# Patient Record
Sex: FEMALE | Race: WHITE | Employment: OTHER | URBAN - NONMETROPOLITAN AREA
[De-identification: names, ages, dates, MRNs, and addresses within clinical notes are randomized per-mention and may not be internally consistent; named-entity substitution may affect disease eponyms.]

---

## 2017-08-15 ENCOUNTER — HOSPITAL ENCOUNTER (OUTPATIENT)
Age: 67
Setting detail: OBSERVATION
Discharge: HOME | DRG: 287 | End: 2017-08-16
Attending: EMERGENCY MEDICINE | Admitting: FAMILY MEDICINE
Payer: COMMERCIAL

## 2017-08-15 ENCOUNTER — APPOINTMENT (OUTPATIENT)
Dept: GENERAL RADIOLOGY | Age: 67
DRG: 287 | End: 2017-08-15
Payer: COMMERCIAL

## 2017-08-15 DIAGNOSIS — I25.10 CORONARY ARTERY DISEASE INVOLVING NATIVE CORONARY ARTERY OF NATIVE HEART, ANGINA PRESENCE UNSPECIFIED: ICD-10-CM

## 2017-08-15 DIAGNOSIS — I47.1 PSVT (PAROXYSMAL SUPRAVENTRICULAR TACHYCARDIA) (HCC): Primary | ICD-10-CM

## 2017-08-15 PROBLEM — I20.8 ANGINA AT REST (HCC): Status: ACTIVE | Noted: 2017-08-15

## 2017-08-15 PROBLEM — I20.89 ANGINA AT REST: Status: ACTIVE | Noted: 2017-08-15

## 2017-08-15 PROBLEM — I47.10 PSVT (PAROXYSMAL SUPRAVENTRICULAR TACHYCARDIA): Status: ACTIVE | Noted: 2017-08-15

## 2017-08-15 PROBLEM — Z72.0 NICOTINE ABUSE: Status: ACTIVE | Noted: 2017-08-15

## 2017-08-15 LAB
% CKMB: 4.2 % (ref 0–3)
% CKMB: 4.6 % (ref 0–3)
-: NORMAL
ABSOLUTE EOS #: 0.1 K/UL (ref 0–0.4)
ABSOLUTE LYMPH #: 3 K/UL (ref 1–4.8)
ABSOLUTE MONO #: 0.6 K/UL (ref 0–1)
ALBUMIN SERPL-MCNC: 3.8 G/DL (ref 3.5–5.2)
ALBUMIN/GLOBULIN RATIO: 0.7 (ref 1–2.5)
ALP BLD-CCNC: 84 U/L (ref 35–104)
ALT SERPL-CCNC: 13 U/L (ref 5–33)
AMORPHOUS: NORMAL
AMPHETAMINE SCREEN URINE: NEGATIVE
ANION GAP SERPL CALCULATED.3IONS-SCNC: 18 MMOL/L (ref 9–17)
AST SERPL-CCNC: 19 U/L
BACTERIA: NORMAL
BARBITURATE SCREEN URINE: NEGATIVE
BASOPHILS # BLD: 0 %
BASOPHILS ABSOLUTE: 0 K/UL (ref 0–0.2)
BENZODIAZEPINE SCREEN, URINE: NEGATIVE
BILIRUB SERPL-MCNC: 0.56 MG/DL (ref 0.3–1.2)
BILIRUBIN URINE: NEGATIVE
BNP INTERPRETATION: ABNORMAL
BUN BLDV-MCNC: 16 MG/DL (ref 8–23)
BUN/CREAT BLD: 23 (ref 9–20)
BUPRENORPHINE URINE: NEGATIVE
CALCIUM SERPL-MCNC: 9.7 MG/DL (ref 8.6–10.4)
CANNABINOID SCREEN URINE: NEGATIVE
CASTS UA: NORMAL /LPF
CHLORIDE BLD-SCNC: 97 MMOL/L (ref 98–107)
CK MB: 2.6 NG/ML
CK MB: 2.7 NG/ML
CKMB INTERPRETATION: ABNORMAL
CKMB INTERPRETATION: ABNORMAL
CO2: 24 MMOL/L (ref 20–31)
COCAINE METABOLITE, URINE: NEGATIVE
COLOR: YELLOW
COMMENT UA: ABNORMAL
CREAT SERPL-MCNC: 0.69 MG/DL (ref 0.5–0.9)
CRYSTALS, UA: NORMAL /HPF
DIFFERENTIAL TYPE: ABNORMAL
EOSINOPHILS RELATIVE PERCENT: 2 %
EPITHELIAL CELLS UA: NORMAL /HPF (ref 0–25)
GFR AFRICAN AMERICAN: >60 ML/MIN
GFR NON-AFRICAN AMERICAN: >60 ML/MIN
GFR SERPL CREATININE-BSD FRML MDRD: ABNORMAL ML/MIN/{1.73_M2}
GFR SERPL CREATININE-BSD FRML MDRD: ABNORMAL ML/MIN/{1.73_M2}
GLUCOSE BLD-MCNC: 110 MG/DL (ref 70–99)
GLUCOSE URINE: NEGATIVE
HCT VFR BLD CALC: 44.6 % (ref 36–46)
HEMOGLOBIN: 15.4 G/DL (ref 12–16)
KETONES, URINE: ABNORMAL
LEUKOCYTE ESTERASE, URINE: NEGATIVE
LV EF: 60 %
LVEF MODALITY: NORMAL
LYMPHOCYTES # BLD: 34 %
MCH RBC QN AUTO: 35.1 PG (ref 26–34)
MCHC RBC AUTO-ENTMCNC: 34.6 G/DL (ref 31–37)
MCV RBC AUTO: 101.3 FL (ref 80–100)
MDMA URINE: ABNORMAL
METHADONE SCREEN, URINE: NEGATIVE
METHAMPHETAMINE, URINE: NEGATIVE
MONOCYTES # BLD: 7 %
MUCUS: NORMAL
NITRITE, URINE: NEGATIVE
OPIATES, URINE: POSITIVE
OTHER OBSERVATIONS UA: NORMAL
OXYCODONE SCREEN URINE: NEGATIVE
PDW BLD-RTO: 14.4 % (ref 12.1–15.2)
PH UA: 6 (ref 5–9)
PHENCYCLIDINE, URINE: NEGATIVE
PLATELET # BLD: 207 K/UL (ref 140–450)
PLATELET ESTIMATE: ABNORMAL
PMV BLD AUTO: 7.8 FL (ref 6–12)
POTASSIUM SERPL-SCNC: 4.1 MMOL/L (ref 3.7–5.3)
PRO-BNP: 345 PG/ML
PROPOXYPHENE, URINE: NEGATIVE
PROTEIN UA: ABNORMAL
RBC # BLD: 4.4 M/UL (ref 4–5.2)
RBC # BLD: ABNORMAL 10*6/UL
RBC UA: NORMAL /HPF (ref 0–2)
RENAL EPITHELIAL, UA: NORMAL /HPF
SEG NEUTROPHILS: 57 %
SEGMENTED NEUTROPHILS ABSOLUTE COUNT: 5.2 K/UL (ref 1.8–7.7)
SODIUM BLD-SCNC: 139 MMOL/L (ref 135–144)
SPECIFIC GRAVITY UA: 1.02 (ref 1.01–1.02)
TEST INFORMATION: ABNORMAL
TOTAL CK: 56 U/L (ref 26–192)
TOTAL CK: 65 U/L (ref 26–192)
TOTAL PROTEIN: 9 G/DL (ref 6.4–8.3)
TRICHOMONAS: NORMAL
TRICYCLIC ANTIDEPRESSANTS, UR: NEGATIVE
TROPONIN INTERP: NORMAL
TROPONIN INTERP: NORMAL
TROPONIN T: <0.03 NG/ML
TROPONIN T: <0.03 NG/ML
TSH SERPL DL<=0.05 MIU/L-ACNC: 1.87 MIU/L (ref 0.3–5)
TURBIDITY: CLEAR
URINE HGB: ABNORMAL
UROBILINOGEN, URINE: NORMAL
WBC # BLD: 8.9 K/UL (ref 3.5–11)
WBC # BLD: ABNORMAL 10*3/UL
WBC UA: NORMAL /HPF (ref 0–5)
YEAST: NORMAL

## 2017-08-15 PROCEDURE — 84484 ASSAY OF TROPONIN QUANT: CPT

## 2017-08-15 PROCEDURE — 82553 CREATINE MB FRACTION: CPT

## 2017-08-15 PROCEDURE — 99222 1ST HOSP IP/OBS MODERATE 55: CPT | Performed by: INTERNAL MEDICINE

## 2017-08-15 PROCEDURE — 99285 EMERGENCY DEPT VISIT HI MDM: CPT

## 2017-08-15 PROCEDURE — 2500000003 HC RX 250 WO HCPCS

## 2017-08-15 PROCEDURE — 6360000002 HC RX W HCPCS

## 2017-08-15 PROCEDURE — 96372 THER/PROPH/DIAG INJ SC/IM: CPT

## 2017-08-15 PROCEDURE — 80306 DRUG TEST PRSMV INSTRMNT: CPT

## 2017-08-15 PROCEDURE — 6370000000 HC RX 637 (ALT 250 FOR IP): Performed by: FAMILY MEDICINE

## 2017-08-15 PROCEDURE — 71010 XR CHEST PORTABLE: CPT

## 2017-08-15 PROCEDURE — 96374 THER/PROPH/DIAG INJ IV PUSH: CPT

## 2017-08-15 PROCEDURE — 96375 TX/PRO/DX INJ NEW DRUG ADDON: CPT

## 2017-08-15 PROCEDURE — 83880 ASSAY OF NATRIURETIC PEPTIDE: CPT

## 2017-08-15 PROCEDURE — 6360000002 HC RX W HCPCS: Performed by: FAMILY MEDICINE

## 2017-08-15 PROCEDURE — 6360000002 HC RX W HCPCS: Performed by: PHYSICIAN ASSISTANT

## 2017-08-15 PROCEDURE — 85025 COMPLETE CBC W/AUTO DIFF WBC: CPT

## 2017-08-15 PROCEDURE — G0378 HOSPITAL OBSERVATION PER HR: HCPCS

## 2017-08-15 PROCEDURE — 93005 ELECTROCARDIOGRAM TRACING: CPT

## 2017-08-15 PROCEDURE — 80053 COMPREHEN METABOLIC PANEL: CPT

## 2017-08-15 PROCEDURE — 82550 ASSAY OF CK (CPK): CPT

## 2017-08-15 PROCEDURE — 2000000000 HC ICU R&B

## 2017-08-15 PROCEDURE — 2580000003 HC RX 258: Performed by: FAMILY MEDICINE

## 2017-08-15 PROCEDURE — 93306 TTE W/DOPPLER COMPLETE: CPT

## 2017-08-15 PROCEDURE — 81001 URINALYSIS AUTO W/SCOPE: CPT

## 2017-08-15 PROCEDURE — 2500000003 HC RX 250 WO HCPCS: Performed by: PHYSICIAN ASSISTANT

## 2017-08-15 PROCEDURE — 84443 ASSAY THYROID STIM HORMONE: CPT

## 2017-08-15 RX ORDER — SODIUM CHLORIDE 0.9 % (FLUSH) 0.9 %
10 SYRINGE (ML) INJECTION EVERY 12 HOURS SCHEDULED
Status: DISCONTINUED | OUTPATIENT
Start: 2017-08-15 | End: 2017-08-16 | Stop reason: HOSPADM

## 2017-08-15 RX ORDER — SODIUM CHLORIDE 0.9 % (FLUSH) 0.9 %
10 SYRINGE (ML) INJECTION PRN
Status: DISCONTINUED | OUTPATIENT
Start: 2017-08-15 | End: 2017-08-16 | Stop reason: HOSPADM

## 2017-08-15 RX ORDER — ACETAMINOPHEN 325 MG/1
650 TABLET ORAL EVERY 4 HOURS PRN
Status: DISCONTINUED | OUTPATIENT
Start: 2017-08-15 | End: 2017-08-16

## 2017-08-15 RX ORDER — METOPROLOL TARTRATE 50 MG/1
50 TABLET, FILM COATED ORAL DAILY
Status: ON HOLD | COMMUNITY
End: 2017-08-16 | Stop reason: HOSPADM

## 2017-08-15 RX ORDER — TROSPIUM CHLORIDE ER 60 MG/1
60 CAPSULE ORAL DAILY
COMMUNITY
End: 2018-07-25 | Stop reason: ALTCHOICE

## 2017-08-15 RX ORDER — ATORVASTATIN CALCIUM 40 MG/1
40 TABLET, FILM COATED ORAL NIGHTLY
Status: DISCONTINUED | OUTPATIENT
Start: 2017-08-16 | End: 2017-08-16

## 2017-08-15 RX ORDER — CLOPIDOGREL BISULFATE 75 MG/1
75 TABLET ORAL DAILY
Status: DISCONTINUED | OUTPATIENT
Start: 2017-08-16 | End: 2017-08-16

## 2017-08-15 RX ORDER — NAPROXEN 500 MG/1
500 TABLET ORAL 2 TIMES DAILY WITH MEALS
Status: ON HOLD | COMMUNITY
End: 2017-08-16 | Stop reason: HOSPADM

## 2017-08-15 RX ORDER — MORPHINE SULFATE 2 MG/ML
2 INJECTION, SOLUTION INTRAMUSCULAR; INTRAVENOUS
Status: DISCONTINUED | OUTPATIENT
Start: 2017-08-15 | End: 2017-08-16 | Stop reason: HOSPADM

## 2017-08-15 RX ORDER — PAROXETINE HYDROCHLORIDE 20 MG/1
20 TABLET, FILM COATED ORAL EVERY MORNING
Status: DISCONTINUED | OUTPATIENT
Start: 2017-08-16 | End: 2017-08-16 | Stop reason: HOSPADM

## 2017-08-15 RX ORDER — METOPROLOL TARTRATE 50 MG/1
50 TABLET, FILM COATED ORAL 2 TIMES DAILY
Status: DISCONTINUED | OUTPATIENT
Start: 2017-08-15 | End: 2017-08-16

## 2017-08-15 RX ORDER — ATORVASTATIN CALCIUM 20 MG/1
20 TABLET, FILM COATED ORAL NIGHTLY
Status: DISCONTINUED | OUTPATIENT
Start: 2017-08-15 | End: 2017-08-15

## 2017-08-15 RX ORDER — ASPIRIN 81 MG/1
324 TABLET, CHEWABLE ORAL ONCE
Status: COMPLETED | OUTPATIENT
Start: 2017-08-15 | End: 2017-08-15

## 2017-08-15 RX ORDER — MECLIZINE HYDROCHLORIDE 25 MG/1
25 TABLET ORAL 3 TIMES DAILY PRN
COMMUNITY
End: 2017-09-14

## 2017-08-15 RX ORDER — ONDANSETRON 2 MG/ML
4 INJECTION INTRAMUSCULAR; INTRAVENOUS EVERY 6 HOURS PRN
Status: DISCONTINUED | OUTPATIENT
Start: 2017-08-15 | End: 2017-08-16 | Stop reason: HOSPADM

## 2017-08-15 RX ORDER — METOPROLOL TARTRATE 5 MG/5ML
5 INJECTION INTRAVENOUS ONCE
Status: COMPLETED | OUTPATIENT
Start: 2017-08-15 | End: 2017-08-15

## 2017-08-15 RX ORDER — MORPHINE SULFATE 4 MG/ML
4 INJECTION, SOLUTION INTRAMUSCULAR; INTRAVENOUS
Status: DISCONTINUED | OUTPATIENT
Start: 2017-08-15 | End: 2017-08-16 | Stop reason: HOSPADM

## 2017-08-15 RX ORDER — PAROXETINE HYDROCHLORIDE 20 MG/1
20 TABLET, FILM COATED ORAL EVERY MORNING
COMMUNITY

## 2017-08-15 RX ORDER — ASPIRIN 81 MG/1
81 TABLET ORAL DAILY
Status: DISCONTINUED | OUTPATIENT
Start: 2017-08-16 | End: 2017-08-16 | Stop reason: HOSPADM

## 2017-08-15 RX ORDER — MORPHINE SULFATE 2 MG/ML
2 INJECTION, SOLUTION INTRAMUSCULAR; INTRAVENOUS ONCE
Status: COMPLETED | OUTPATIENT
Start: 2017-08-15 | End: 2017-08-15

## 2017-08-15 RX ADMIN — MORPHINE SULFATE 2 MG: 2 INJECTION, SOLUTION INTRAMUSCULAR; INTRAVENOUS at 15:39

## 2017-08-15 RX ADMIN — METOPROLOL TARTRATE 5 MG: 5 INJECTION INTRAVENOUS at 15:42

## 2017-08-15 RX ADMIN — METOPROLOL TARTRATE 50 MG: 50 TABLET ORAL at 20:19

## 2017-08-15 RX ADMIN — Medication 10 ML: at 21:00

## 2017-08-15 RX ADMIN — ENOXAPARIN SODIUM 40 MG: 40 INJECTION SUBCUTANEOUS at 20:19

## 2017-08-15 RX ADMIN — ATORVASTATIN CALCIUM 20 MG: 20 TABLET, FILM COATED ORAL at 20:19

## 2017-08-15 RX ADMIN — ASPIRIN 81 MG 324 MG: 81 TABLET ORAL at 20:18

## 2017-08-15 ASSESSMENT — PAIN DESCRIPTION - DESCRIPTORS
DESCRIPTORS: PRESSURE
DESCRIPTORS: PRESSURE

## 2017-08-15 ASSESSMENT — ENCOUNTER SYMPTOMS
WHEEZING: 0
SHORTNESS OF BREATH: 1
BACK PAIN: 0
TROUBLE SWALLOWING: 0
ABDOMINAL PAIN: 0
CHEST TIGHTNESS: 0
VOMITING: 0
COUGH: 0
CHOKING: 0
COLOR CHANGE: 0
EYE DISCHARGE: 0
ABDOMINAL DISTENTION: 0
CONSTIPATION: 0
DIARRHEA: 0
NAUSEA: 1

## 2017-08-15 ASSESSMENT — PAIN DESCRIPTION - ORIENTATION: ORIENTATION: LEFT;MID

## 2017-08-15 ASSESSMENT — PAIN SCALES - GENERAL
PAINLEVEL_OUTOF10: 1
PAINLEVEL_OUTOF10: 7
PAINLEVEL_OUTOF10: 8
PAINLEVEL_OUTOF10: 0

## 2017-08-15 ASSESSMENT — PAIN DESCRIPTION - FREQUENCY: FREQUENCY: CONTINUOUS

## 2017-08-15 ASSESSMENT — PAIN DESCRIPTION - PAIN TYPE: TYPE: ACUTE PAIN

## 2017-08-15 ASSESSMENT — PAIN DESCRIPTION - LOCATION
LOCATION: CHEST
LOCATION: CHEST

## 2017-08-16 VITALS
WEIGHT: 154.3 LBS | HEART RATE: 51 BPM | OXYGEN SATURATION: 93 % | SYSTOLIC BLOOD PRESSURE: 153 MMHG | RESPIRATION RATE: 14 BRPM | DIASTOLIC BLOOD PRESSURE: 66 MMHG | TEMPERATURE: 98.2 F | HEIGHT: 66 IN | BODY MASS INDEX: 24.8 KG/M2

## 2017-08-16 DIAGNOSIS — I47.1 PSVT (PAROXYSMAL SUPRAVENTRICULAR TACHYCARDIA) (HCC): Primary | ICD-10-CM

## 2017-08-16 LAB
ABSOLUTE EOS #: 0.2 K/UL (ref 0–0.4)
ABSOLUTE LYMPH #: 2.7 K/UL (ref 1–4.8)
ABSOLUTE MONO #: 0.5 K/UL (ref 0–1)
ALBUMIN SERPL-MCNC: 3.2 G/DL (ref 3.5–5.2)
ALBUMIN/GLOBULIN RATIO: 0.7 (ref 1–2.5)
ALP BLD-CCNC: 60 U/L (ref 35–104)
ALT SERPL-CCNC: 11 U/L (ref 5–33)
ANION GAP SERPL CALCULATED.3IONS-SCNC: 13 MMOL/L (ref 9–17)
AST SERPL-CCNC: 16 U/L
BASOPHILS # BLD: 0 %
BASOPHILS ABSOLUTE: 0 K/UL (ref 0–0.2)
BILIRUB SERPL-MCNC: 0.65 MG/DL (ref 0.3–1.2)
BUN BLDV-MCNC: 14 MG/DL (ref 8–23)
BUN/CREAT BLD: 24 (ref 9–20)
CALCIUM SERPL-MCNC: 9.3 MG/DL (ref 8.6–10.4)
CHLORIDE BLD-SCNC: 99 MMOL/L (ref 98–107)
CHOLESTEROL/HDL RATIO: 1.9
CHOLESTEROL: 108 MG/DL
CO2: 28 MMOL/L (ref 20–31)
CREAT SERPL-MCNC: 0.59 MG/DL (ref 0.5–0.9)
DIFFERENTIAL TYPE: ABNORMAL
EKG ATRIAL RATE: 138 BPM
EKG ATRIAL RATE: 55 BPM
EKG ATRIAL RATE: 62 BPM
EKG P AXIS: 29 DEGREES
EKG P AXIS: 64 DEGREES
EKG P-R INTERVAL: 206 MS
EKG P-R INTERVAL: 230 MS
EKG Q-T INTERVAL: 322 MS
EKG Q-T INTERVAL: 432 MS
EKG Q-T INTERVAL: 466 MS
EKG QRS DURATION: 86 MS
EKG QRS DURATION: 92 MS
EKG QRS DURATION: 94 MS
EKG QTC CALCULATION (BAZETT): 438 MS
EKG QTC CALCULATION (BAZETT): 445 MS
EKG QTC CALCULATION (BAZETT): 480 MS
EKG R AXIS: -17 DEGREES
EKG R AXIS: 3 DEGREES
EKG R AXIS: 59 DEGREES
EKG T AXIS: 17 DEGREES
EKG T AXIS: 20 DEGREES
EKG T AXIS: 20 DEGREES
EKG VENTRICULAR RATE: 134 BPM
EKG VENTRICULAR RATE: 55 BPM
EKG VENTRICULAR RATE: 62 BPM
EOSINOPHILS RELATIVE PERCENT: 4 %
ESTIMATED AVERAGE GLUCOSE: 97 MG/DL
GFR AFRICAN AMERICAN: >60 ML/MIN
GFR NON-AFRICAN AMERICAN: >60 ML/MIN
GFR SERPL CREATININE-BSD FRML MDRD: ABNORMAL ML/MIN/{1.73_M2}
GFR SERPL CREATININE-BSD FRML MDRD: ABNORMAL ML/MIN/{1.73_M2}
GLUCOSE BLD-MCNC: 85 MG/DL (ref 70–99)
HBA1C MFR BLD: 5 % (ref 4.8–5.9)
HCT VFR BLD CALC: 39.8 % (ref 36–46)
HDLC SERPL-MCNC: 57 MG/DL
HEMOGLOBIN: 13.5 G/DL (ref 12–16)
LDL CHOLESTEROL: 33 MG/DL (ref 0–130)
LYMPHOCYTES # BLD: 44 %
MCH RBC QN AUTO: 34.7 PG (ref 26–34)
MCHC RBC AUTO-ENTMCNC: 33.9 G/DL (ref 31–37)
MCV RBC AUTO: 102.3 FL (ref 80–100)
MONOCYTES # BLD: 8 %
PDW BLD-RTO: 14.6 % (ref 12.1–15.2)
PLATELET # BLD: 164 K/UL (ref 140–450)
PLATELET ESTIMATE: ABNORMAL
PMV BLD AUTO: 7.7 FL (ref 6–12)
POTASSIUM SERPL-SCNC: 4.1 MMOL/L (ref 3.7–5.3)
RBC # BLD: 3.9 M/UL (ref 4–5.2)
RBC # BLD: ABNORMAL 10*6/UL
SEG NEUTROPHILS: 44 %
SEGMENTED NEUTROPHILS ABSOLUTE COUNT: 2.7 K/UL (ref 1.8–7.7)
SODIUM BLD-SCNC: 140 MMOL/L (ref 135–144)
TOTAL PROTEIN: 7.5 G/DL (ref 6.4–8.3)
TRIGL SERPL-MCNC: 89 MG/DL
VLDLC SERPL CALC-MCNC: NORMAL MG/DL (ref 1–30)
WBC # BLD: 6.1 K/UL (ref 3.5–11)
WBC # BLD: ABNORMAL 10*3/UL

## 2017-08-16 PROCEDURE — 80061 LIPID PANEL: CPT

## 2017-08-16 PROCEDURE — 85025 COMPLETE CBC W/AUTO DIFF WBC: CPT

## 2017-08-16 PROCEDURE — 6370000000 HC RX 637 (ALT 250 FOR IP): Performed by: INTERNAL MEDICINE

## 2017-08-16 PROCEDURE — G0378 HOSPITAL OBSERVATION PER HR: HCPCS

## 2017-08-16 PROCEDURE — 2580000003 HC RX 258: Performed by: FAMILY MEDICINE

## 2017-08-16 PROCEDURE — C1769 GUIDE WIRE: HCPCS

## 2017-08-16 PROCEDURE — 6370000000 HC RX 637 (ALT 250 FOR IP): Performed by: FAMILY MEDICINE

## 2017-08-16 PROCEDURE — 83036 HEMOGLOBIN GLYCOSYLATED A1C: CPT

## 2017-08-16 PROCEDURE — C1894 INTRO/SHEATH, NON-LASER: HCPCS

## 2017-08-16 PROCEDURE — 80053 COMPREHEN METABOLIC PANEL: CPT

## 2017-08-16 PROCEDURE — 93458 L HRT ARTERY/VENTRICLE ANGIO: CPT | Performed by: FAMILY MEDICINE

## 2017-08-16 PROCEDURE — C1725 CATH, TRANSLUMIN NON-LASER: HCPCS

## 2017-08-16 PROCEDURE — 36415 COLL VENOUS BLD VENIPUNCTURE: CPT

## 2017-08-16 PROCEDURE — 93005 ELECTROCARDIOGRAM TRACING: CPT

## 2017-08-16 RX ORDER — DILTIAZEM HYDROCHLORIDE 120 MG/1
120 CAPSULE, COATED, EXTENDED RELEASE ORAL DAILY
Qty: 30 CAPSULE | Refills: 3 | Status: SHIPPED | OUTPATIENT
Start: 2017-08-16 | End: 2017-08-18 | Stop reason: SDUPTHER

## 2017-08-16 RX ORDER — ASPIRIN 81 MG/1
81 TABLET ORAL DAILY
Qty: 30 TABLET | Refills: 3 | Status: ON HOLD | OUTPATIENT
Start: 2017-08-16 | End: 2018-07-22 | Stop reason: HOSPADM

## 2017-08-16 RX ORDER — ACETAMINOPHEN 325 MG/1
650 TABLET ORAL EVERY 4 HOURS PRN
Status: DISCONTINUED | OUTPATIENT
Start: 2017-08-16 | End: 2017-08-16 | Stop reason: HOSPADM

## 2017-08-16 RX ORDER — ATORVASTATIN CALCIUM 10 MG/1
10 TABLET, FILM COATED ORAL DAILY
Qty: 30 TABLET | Refills: 0 | Status: SHIPPED | OUTPATIENT
Start: 2017-08-16 | End: 2017-08-18 | Stop reason: SDUPTHER

## 2017-08-16 RX ORDER — DILTIAZEM HYDROCHLORIDE 120 MG/1
120 CAPSULE, COATED, EXTENDED RELEASE ORAL DAILY
Status: DISCONTINUED | OUTPATIENT
Start: 2017-08-16 | End: 2017-08-16 | Stop reason: HOSPADM

## 2017-08-16 RX ADMIN — PAROXETINE HYDROCHLORIDE 20 MG: 20 TABLET, FILM COATED ORAL at 08:59

## 2017-08-16 RX ADMIN — DILTIAZEM HYDROCHLORIDE 120 MG: 120 CAPSULE, COATED, EXTENDED RELEASE ORAL at 16:59

## 2017-08-16 RX ADMIN — CLOPIDOGREL BISULFATE 75 MG: 75 TABLET ORAL at 08:59

## 2017-08-16 RX ADMIN — ASPIRIN 81 MG: 81 TABLET, COATED ORAL at 08:59

## 2017-08-16 RX ADMIN — Medication 10 ML: at 08:59

## 2017-08-16 ASSESSMENT — PAIN SCALES - GENERAL
PAINLEVEL_OUTOF10: 0

## 2017-08-25 ENCOUNTER — HOSPITAL ENCOUNTER (OUTPATIENT)
Dept: NON INVASIVE DIAGNOSTICS | Age: 67
Discharge: HOME | End: 2017-08-25
Payer: COMMERCIAL

## 2017-08-25 PROCEDURE — 93270 REMOTE 30 DAY ECG REV/REPORT: CPT

## 2017-09-07 ENCOUNTER — TELEPHONE (OUTPATIENT)
Dept: CARDIOLOGY | Age: 67
End: 2017-09-07

## 2017-09-14 ENCOUNTER — OFFICE VISIT (OUTPATIENT)
Dept: CARDIOLOGY | Age: 67
End: 2017-09-14
Payer: COMMERCIAL

## 2017-09-14 VITALS
HEART RATE: 82 BPM | SYSTOLIC BLOOD PRESSURE: 132 MMHG | OXYGEN SATURATION: 98 % | HEIGHT: 66 IN | WEIGHT: 152 LBS | DIASTOLIC BLOOD PRESSURE: 75 MMHG | BODY MASS INDEX: 24.43 KG/M2

## 2017-09-14 DIAGNOSIS — E78.5 DYSLIPIDEMIA: ICD-10-CM

## 2017-09-14 DIAGNOSIS — I47.1 PSVT (PAROXYSMAL SUPRAVENTRICULAR TACHYCARDIA) (HCC): Primary | ICD-10-CM

## 2017-09-14 DIAGNOSIS — R07.89 CHEST PRESSURE: ICD-10-CM

## 2017-09-14 DIAGNOSIS — I10 ESSENTIAL HYPERTENSION: ICD-10-CM

## 2017-09-14 DIAGNOSIS — Z71.6 TOBACCO ABUSE COUNSELING: ICD-10-CM

## 2017-09-14 DIAGNOSIS — Z72.0 TOBACCO ABUSE: ICD-10-CM

## 2017-09-14 DIAGNOSIS — I25.10 MILD CAD: ICD-10-CM

## 2017-09-14 PROCEDURE — 4004F PT TOBACCO SCREEN RCVD TLK: CPT | Performed by: INTERNAL MEDICINE

## 2017-09-14 PROCEDURE — 99214 OFFICE O/P EST MOD 30 MIN: CPT | Performed by: INTERNAL MEDICINE

## 2017-09-14 PROCEDURE — G8598 ASA/ANTIPLAT THER USED: HCPCS | Performed by: INTERNAL MEDICINE

## 2017-09-14 PROCEDURE — 3014F SCREEN MAMMO DOC REV: CPT | Performed by: INTERNAL MEDICINE

## 2017-09-14 PROCEDURE — 3017F COLORECTAL CA SCREEN DOC REV: CPT | Performed by: INTERNAL MEDICINE

## 2017-09-14 PROCEDURE — G8420 CALC BMI NORM PARAMETERS: HCPCS | Performed by: INTERNAL MEDICINE

## 2017-09-14 PROCEDURE — 93000 ELECTROCARDIOGRAM COMPLETE: CPT | Performed by: INTERNAL MEDICINE

## 2017-09-14 PROCEDURE — G8427 DOCREV CUR MEDS BY ELIG CLIN: HCPCS | Performed by: INTERNAL MEDICINE

## 2017-09-14 RX ORDER — DILTIAZEM HYDROCHLORIDE 120 MG/1
120 CAPSULE, COATED, EXTENDED RELEASE ORAL DAILY
Qty: 90 CAPSULE | Refills: 3 | Status: SHIPPED | OUTPATIENT
Start: 2017-09-14 | End: 2017-12-13 | Stop reason: SDUPTHER

## 2017-09-14 RX ORDER — MELOXICAM 7.5 MG/1
7.5 TABLET ORAL DAILY
Status: ON HOLD | COMMUNITY
End: 2018-07-19 | Stop reason: ALTCHOICE

## 2017-09-14 RX ORDER — M-VIT,TX,IRON,MINS/CALC/FOLIC 27MG-0.4MG
1 TABLET ORAL DAILY
COMMUNITY

## 2017-09-14 RX ORDER — ATORVASTATIN CALCIUM 10 MG/1
10 TABLET, FILM COATED ORAL DAILY
Qty: 90 TABLET | Refills: 3 | Status: SHIPPED | OUTPATIENT
Start: 2017-09-14 | End: 2017-12-13 | Stop reason: SDUPTHER

## 2017-09-14 RX ORDER — CALCIUM CARBONATE 500(1250)
1200 TABLET ORAL DAILY
COMMUNITY

## 2017-09-14 ASSESSMENT — ENCOUNTER SYMPTOMS
NAUSEA: 1
TROUBLE SWALLOWING: 0
CHOKING: 0
COUGH: 0
WHEEZING: 0
COLOR CHANGE: 0
ABDOMINAL DISTENTION: 0
ABDOMINAL PAIN: 0
CONSTIPATION: 0
BACK PAIN: 0
VOMITING: 0
SHORTNESS OF BREATH: 1
CHEST TIGHTNESS: 0
EYE DISCHARGE: 0
DIARRHEA: 0

## 2017-12-13 RX ORDER — DILTIAZEM HYDROCHLORIDE 120 MG/1
120 CAPSULE, COATED, EXTENDED RELEASE ORAL DAILY
Qty: 90 CAPSULE | Refills: 3 | Status: ON HOLD | OUTPATIENT
Start: 2017-12-13 | End: 2018-07-19 | Stop reason: ALTCHOICE

## 2017-12-13 RX ORDER — ATORVASTATIN CALCIUM 10 MG/1
10 TABLET, FILM COATED ORAL DAILY
Qty: 90 TABLET | Refills: 3 | Status: SHIPPED | OUTPATIENT
Start: 2017-12-13 | End: 2018-12-06 | Stop reason: SDUPTHER

## 2018-03-15 ENCOUNTER — OFFICE VISIT (OUTPATIENT)
Dept: CARDIOLOGY | Age: 68
End: 2018-03-15
Payer: MEDICARE

## 2018-03-15 VITALS
SYSTOLIC BLOOD PRESSURE: 148 MMHG | RESPIRATION RATE: 20 BRPM | HEART RATE: 60 BPM | HEIGHT: 67 IN | OXYGEN SATURATION: 96 % | BODY MASS INDEX: 23.45 KG/M2 | DIASTOLIC BLOOD PRESSURE: 65 MMHG | WEIGHT: 149.4 LBS

## 2018-03-15 DIAGNOSIS — I47.1 PSVT (PAROXYSMAL SUPRAVENTRICULAR TACHYCARDIA) (HCC): Primary | ICD-10-CM

## 2018-03-15 DIAGNOSIS — E78.5 DYSLIPIDEMIA: ICD-10-CM

## 2018-03-15 DIAGNOSIS — I25.10 MILD CAD: ICD-10-CM

## 2018-03-15 DIAGNOSIS — Z72.0 TOBACCO ABUSE: ICD-10-CM

## 2018-03-15 DIAGNOSIS — Z71.6 TOBACCO ABUSE COUNSELING: ICD-10-CM

## 2018-03-15 PROCEDURE — 1123F ACP DISCUSS/DSCN MKR DOCD: CPT | Performed by: INTERNAL MEDICINE

## 2018-03-15 PROCEDURE — 3017F COLORECTAL CA SCREEN DOC REV: CPT | Performed by: INTERNAL MEDICINE

## 2018-03-15 PROCEDURE — G8427 DOCREV CUR MEDS BY ELIG CLIN: HCPCS | Performed by: INTERNAL MEDICINE

## 2018-03-15 PROCEDURE — 1090F PRES/ABSN URINE INCON ASSESS: CPT | Performed by: INTERNAL MEDICINE

## 2018-03-15 PROCEDURE — 4004F PT TOBACCO SCREEN RCVD TLK: CPT | Performed by: INTERNAL MEDICINE

## 2018-03-15 PROCEDURE — G8599 NO ASA/ANTIPLAT THER USE RNG: HCPCS | Performed by: INTERNAL MEDICINE

## 2018-03-15 PROCEDURE — G8484 FLU IMMUNIZE NO ADMIN: HCPCS | Performed by: INTERNAL MEDICINE

## 2018-03-15 PROCEDURE — 99214 OFFICE O/P EST MOD 30 MIN: CPT | Performed by: INTERNAL MEDICINE

## 2018-03-15 PROCEDURE — G8400 PT W/DXA NO RESULTS DOC: HCPCS | Performed by: INTERNAL MEDICINE

## 2018-03-15 PROCEDURE — G8420 CALC BMI NORM PARAMETERS: HCPCS | Performed by: INTERNAL MEDICINE

## 2018-03-15 PROCEDURE — 4040F PNEUMOC VAC/ADMIN/RCVD: CPT | Performed by: INTERNAL MEDICINE

## 2018-03-15 PROCEDURE — 3014F SCREEN MAMMO DOC REV: CPT | Performed by: INTERNAL MEDICINE

## 2018-03-15 NOTE — PROGRESS NOTES
call us back if her symptoms became frequent or uncontrolled by vagal maneuvers. Atherosclerotic Heart Disease:  Antiplatelet Agent: Continue aspirin 81 mg daily. I also reminded her to watch for signs of blood in her stool or black tarry stools and stop the medication immediately if this develops as this could be life threatening. Statin Therapy: Continue atorvastatin (Lipitor) 10 mg nightly. .        I spent about 5-10 minutes discussing the dangers of tobacco abuse as well as multiple methods for trying to quit smoking. In the end, Ms. Anabella Roper said that he did not want any help at the current time, but would continue to try and quit smoking completely in the near future. Follow up:   Return in about 1 year (around 3/15/2019). The documentation recorded by the scribe, accurately and completely reflects the services I personally performed and the decisions made by me. Phil Devries MD, F.A.C.C. 3/15/2018     I believe that the risk of significant morbidity and mortality related to the patient's current medical conditions are: Intermediate. 25 minutes were spent with the patient and all of her questions were answered.

## 2018-07-19 ENCOUNTER — APPOINTMENT (OUTPATIENT)
Dept: CT IMAGING | Age: 68
DRG: 378 | End: 2018-07-19
Payer: MEDICARE

## 2018-07-19 ENCOUNTER — HOSPITAL ENCOUNTER (INPATIENT)
Age: 68
LOS: 3 days | Discharge: HOME | DRG: 378 | End: 2018-07-22
Attending: FAMILY MEDICINE | Admitting: FAMILY MEDICINE
Payer: MEDICARE

## 2018-07-19 ENCOUNTER — APPOINTMENT (OUTPATIENT)
Dept: GENERAL RADIOLOGY | Age: 68
DRG: 378 | End: 2018-07-19
Payer: MEDICARE

## 2018-07-19 DIAGNOSIS — R11.2 NAUSEA AND VOMITING, INTRACTABILITY OF VOMITING NOT SPECIFIED, UNSPECIFIED VOMITING TYPE: ICD-10-CM

## 2018-07-19 DIAGNOSIS — R10.13 EPIGASTRIC PAIN: Primary | ICD-10-CM

## 2018-07-19 DIAGNOSIS — K56.609 SMALL BOWEL OBSTRUCTION (HCC): ICD-10-CM

## 2018-07-19 PROBLEM — R10.9 ACUTE ABDOMINAL PAIN: Status: ACTIVE | Noted: 2018-07-19

## 2018-07-19 LAB
-: ABNORMAL
ABSOLUTE EOS #: 0.06 K/UL (ref 0–0.44)
ABSOLUTE IMMATURE GRANULOCYTE: <0.03 K/UL (ref 0–0.3)
ABSOLUTE LYMPH #: 2.21 K/UL (ref 1.1–3.7)
ABSOLUTE MONO #: 0.33 K/UL (ref 0.1–1.2)
ALBUMIN SERPL-MCNC: 3.8 G/DL (ref 3.5–5.2)
ALBUMIN/GLOBULIN RATIO: 0.7 (ref 1–2.5)
ALP BLD-CCNC: 73 U/L (ref 35–104)
ALT SERPL-CCNC: 8 U/L (ref 5–33)
AMORPHOUS: ABNORMAL
ANION GAP SERPL CALCULATED.3IONS-SCNC: 17 MMOL/L (ref 9–17)
AST SERPL-CCNC: 15 U/L
BACTERIA: ABNORMAL
BASOPHILS # BLD: 0 % (ref 0–2)
BASOPHILS ABSOLUTE: <0.03 K/UL (ref 0–0.2)
BILIRUB SERPL-MCNC: 0.44 MG/DL (ref 0.3–1.2)
BILIRUBIN URINE: NEGATIVE
BUN BLDV-MCNC: 17 MG/DL (ref 8–23)
BUN/CREAT BLD: 26 (ref 9–20)
CALCIUM SERPL-MCNC: 10 MG/DL (ref 8.6–10.4)
CASTS UA: ABNORMAL /LPF
CHLORIDE BLD-SCNC: 96 MMOL/L (ref 98–107)
CO2: 25 MMOL/L (ref 20–31)
COLOR: YELLOW
COMMENT UA: ABNORMAL
CREAT SERPL-MCNC: 0.66 MG/DL (ref 0.5–0.9)
CRYSTALS, UA: ABNORMAL /HPF
DIFFERENTIAL TYPE: ABNORMAL
EKG ATRIAL RATE: 50 BPM
EKG P AXIS: 31 DEGREES
EKG P-R INTERVAL: 188 MS
EKG Q-T INTERVAL: 454 MS
EKG QRS DURATION: 94 MS
EKG QTC CALCULATION (BAZETT): 413 MS
EKG R AXIS: 0 DEGREES
EKG T AXIS: 34 DEGREES
EKG VENTRICULAR RATE: 50 BPM
EOSINOPHILS RELATIVE PERCENT: 1 % (ref 1–4)
EPITHELIAL CELLS UA: ABNORMAL /HPF (ref 0–25)
GFR AFRICAN AMERICAN: >60 ML/MIN
GFR NON-AFRICAN AMERICAN: >60 ML/MIN
GFR SERPL CREATININE-BSD FRML MDRD: ABNORMAL ML/MIN/{1.73_M2}
GFR SERPL CREATININE-BSD FRML MDRD: ABNORMAL ML/MIN/{1.73_M2}
GLUCOSE BLD-MCNC: 101 MG/DL (ref 70–99)
GLUCOSE URINE: NEGATIVE
HCT VFR BLD CALC: 38 % (ref 36.3–47.1)
HEMOGLOBIN: 13.1 G/DL (ref 11.9–15.1)
IMMATURE GRANULOCYTES: 0 %
KETONES, URINE: NEGATIVE
LACTIC ACID, WHOLE BLOOD: NORMAL MMOL/L (ref 0.7–2.1)
LACTIC ACID: 0.9 MMOL/L (ref 0.5–2.2)
LEUKOCYTE ESTERASE, URINE: NEGATIVE
LIPASE: 18 U/L (ref 13–60)
LYMPHOCYTES # BLD: 45 % (ref 24–43)
MCH RBC QN AUTO: 34 PG (ref 25.2–33.5)
MCHC RBC AUTO-ENTMCNC: 34.5 G/DL (ref 28.4–34.8)
MCV RBC AUTO: 98.7 FL (ref 82.6–102.9)
MONOCYTES # BLD: 7 % (ref 3–12)
MUCUS: ABNORMAL
NITRITE, URINE: NEGATIVE
NRBC AUTOMATED: 0 PER 100 WBC
OTHER OBSERVATIONS UA: ABNORMAL
PDW BLD-RTO: 11.9 % (ref 11.8–14.4)
PH UA: 8.5 (ref 5–9)
PLATELET # BLD: 179 K/UL (ref 138–453)
PLATELET ESTIMATE: ABNORMAL
PMV BLD AUTO: 9.3 FL (ref 8.1–13.5)
POTASSIUM SERPL-SCNC: 4.2 MMOL/L (ref 3.7–5.3)
PROTEIN UA: NEGATIVE
RBC # BLD: 3.85 M/UL (ref 3.95–5.11)
RBC # BLD: ABNORMAL 10*6/UL
RBC UA: ABNORMAL /HPF (ref 0–2)
RENAL EPITHELIAL, UA: ABNORMAL /HPF
SEG NEUTROPHILS: 47 % (ref 36–65)
SEGMENTED NEUTROPHILS ABSOLUTE COUNT: 2.35 K/UL (ref 1.5–8.1)
SODIUM BLD-SCNC: 138 MMOL/L (ref 135–144)
SPECIFIC GRAVITY UA: 1.01 (ref 1.01–1.02)
TOTAL PROTEIN: 9.1 G/DL (ref 6.4–8.3)
TRICHOMONAS: ABNORMAL
TROPONIN INTERP: NORMAL
TROPONIN T: <0.03 NG/ML
TURBIDITY: ABNORMAL
URINE HGB: NEGATIVE
UROBILINOGEN, URINE: NORMAL
WBC # BLD: 5 K/UL (ref 3.5–11.3)
WBC # BLD: ABNORMAL 10*3/UL
WBC UA: ABNORMAL /HPF (ref 0–5)
YEAST: ABNORMAL

## 2018-07-19 PROCEDURE — 84484 ASSAY OF TROPONIN QUANT: CPT

## 2018-07-19 PROCEDURE — 87086 URINE CULTURE/COLONY COUNT: CPT

## 2018-07-19 PROCEDURE — 83605 ASSAY OF LACTIC ACID: CPT

## 2018-07-19 PROCEDURE — 74174 CTA ABD&PLVS W/CONTRAST: CPT

## 2018-07-19 PROCEDURE — 96375 TX/PRO/DX INJ NEW DRUG ADDON: CPT

## 2018-07-19 PROCEDURE — 96374 THER/PROPH/DIAG INJ IV PUSH: CPT

## 2018-07-19 PROCEDURE — 6360000002 HC RX W HCPCS: Performed by: EMERGENCY MEDICINE

## 2018-07-19 PROCEDURE — 71275 CT ANGIOGRAPHY CHEST: CPT

## 2018-07-19 PROCEDURE — 6360000002 HC RX W HCPCS

## 2018-07-19 PROCEDURE — 81001 URINALYSIS AUTO W/SCOPE: CPT

## 2018-07-19 PROCEDURE — 87040 BLOOD CULTURE FOR BACTERIA: CPT

## 2018-07-19 PROCEDURE — 85025 COMPLETE CBC W/AUTO DIFF WBC: CPT

## 2018-07-19 PROCEDURE — 71045 X-RAY EXAM CHEST 1 VIEW: CPT

## 2018-07-19 PROCEDURE — 2000000000 HC ICU R&B

## 2018-07-19 PROCEDURE — 2580000003 HC RX 258: Performed by: FAMILY MEDICINE

## 2018-07-19 PROCEDURE — 93005 ELECTROCARDIOGRAM TRACING: CPT

## 2018-07-19 PROCEDURE — 80053 COMPREHEN METABOLIC PANEL: CPT

## 2018-07-19 PROCEDURE — 6360000002 HC RX W HCPCS: Performed by: FAMILY MEDICINE

## 2018-07-19 PROCEDURE — 6360000004 HC RX CONTRAST MEDICATION: Performed by: PHYSICIAN ASSISTANT

## 2018-07-19 PROCEDURE — 83690 ASSAY OF LIPASE: CPT

## 2018-07-19 PROCEDURE — 99285 EMERGENCY DEPT VISIT HI MDM: CPT

## 2018-07-19 PROCEDURE — 6360000002 HC RX W HCPCS: Performed by: PHYSICIAN ASSISTANT

## 2018-07-19 PROCEDURE — 36415 COLL VENOUS BLD VENIPUNCTURE: CPT

## 2018-07-19 PROCEDURE — 96376 TX/PRO/DX INJ SAME DRUG ADON: CPT

## 2018-07-19 RX ORDER — ONDANSETRON 2 MG/ML
4 INJECTION INTRAMUSCULAR; INTRAVENOUS ONCE
Status: COMPLETED | OUTPATIENT
Start: 2018-07-19 | End: 2018-07-19

## 2018-07-19 RX ORDER — POTASSIUM CHLORIDE 20 MEQ/1
40 TABLET, EXTENDED RELEASE ORAL PRN
Status: DISCONTINUED | OUTPATIENT
Start: 2018-07-19 | End: 2018-07-22 | Stop reason: HOSPADM

## 2018-07-19 RX ORDER — SODIUM CHLORIDE, SODIUM LACTATE, POTASSIUM CHLORIDE, CALCIUM CHLORIDE 600; 310; 30; 20 MG/100ML; MG/100ML; MG/100ML; MG/100ML
INJECTION, SOLUTION INTRAVENOUS CONTINUOUS
Status: DISCONTINUED | OUTPATIENT
Start: 2018-07-19 | End: 2018-07-22 | Stop reason: HOSPADM

## 2018-07-19 RX ORDER — ONDANSETRON 2 MG/ML
INJECTION INTRAMUSCULAR; INTRAVENOUS
Status: DISPENSED
Start: 2018-07-19 | End: 2018-07-20

## 2018-07-19 RX ORDER — MORPHINE SULFATE 4 MG/ML
4 INJECTION, SOLUTION INTRAMUSCULAR; INTRAVENOUS ONCE
Status: COMPLETED | OUTPATIENT
Start: 2018-07-19 | End: 2018-07-19

## 2018-07-19 RX ORDER — METOPROLOL TARTRATE 5 MG/5ML
5 INJECTION INTRAVENOUS EVERY 6 HOURS PRN
Status: DISCONTINUED | OUTPATIENT
Start: 2018-07-19 | End: 2018-07-22 | Stop reason: HOSPADM

## 2018-07-19 RX ORDER — MORPHINE SULFATE 2 MG/ML
2 INJECTION, SOLUTION INTRAMUSCULAR; INTRAVENOUS
Status: DISCONTINUED | OUTPATIENT
Start: 2018-07-19 | End: 2018-07-22 | Stop reason: HOSPADM

## 2018-07-19 RX ORDER — POTASSIUM CHLORIDE 20MEQ/15ML
40 LIQUID (ML) ORAL PRN
Status: DISCONTINUED | OUTPATIENT
Start: 2018-07-19 | End: 2018-07-22 | Stop reason: HOSPADM

## 2018-07-19 RX ORDER — SODIUM CHLORIDE 0.9 % (FLUSH) 0.9 %
10 SYRINGE (ML) INJECTION EVERY 12 HOURS SCHEDULED
Status: DISCONTINUED | OUTPATIENT
Start: 2018-07-19 | End: 2018-07-22 | Stop reason: HOSPADM

## 2018-07-19 RX ORDER — POTASSIUM CHLORIDE 7.45 MG/ML
10 INJECTION INTRAVENOUS PRN
Status: DISCONTINUED | OUTPATIENT
Start: 2018-07-19 | End: 2018-07-22 | Stop reason: HOSPADM

## 2018-07-19 RX ORDER — MORPHINE SULFATE 4 MG/ML
INJECTION, SOLUTION INTRAMUSCULAR; INTRAVENOUS
Status: DISPENSED
Start: 2018-07-19 | End: 2018-07-20

## 2018-07-19 RX ORDER — ADENOSINE 3 MG/ML
6 INJECTION, SOLUTION INTRAVENOUS ONCE
Status: COMPLETED | OUTPATIENT
Start: 2018-07-19 | End: 2018-07-19

## 2018-07-19 RX ORDER — SODIUM CHLORIDE 0.9 % (FLUSH) 0.9 %
10 SYRINGE (ML) INJECTION PRN
Status: DISCONTINUED | OUTPATIENT
Start: 2018-07-19 | End: 2018-07-22 | Stop reason: HOSPADM

## 2018-07-19 RX ADMIN — SODIUM CHLORIDE, POTASSIUM CHLORIDE, SODIUM LACTATE AND CALCIUM CHLORIDE: 600; 310; 30; 20 INJECTION, SOLUTION INTRAVENOUS at 23:18

## 2018-07-19 RX ADMIN — ONDANSETRON 4 MG: 2 INJECTION INTRAMUSCULAR; INTRAVENOUS at 13:02

## 2018-07-19 RX ADMIN — ADENOSINE 6 MG: 3 INJECTION, SOLUTION INTRAVENOUS at 19:05

## 2018-07-19 RX ADMIN — MORPHINE SULFATE 4 MG: 4 INJECTION INTRAVENOUS at 16:35

## 2018-07-19 RX ADMIN — MORPHINE SULFATE 4 MG: 4 INJECTION INTRAVENOUS at 13:59

## 2018-07-19 RX ADMIN — ONDANSETRON 4 MG: 2 INJECTION INTRAMUSCULAR; INTRAVENOUS at 16:38

## 2018-07-19 RX ADMIN — MORPHINE SULFATE 4 MG: 4 INJECTION INTRAVENOUS at 14:18

## 2018-07-19 RX ADMIN — ONDANSETRON 4 MG: 2 INJECTION INTRAMUSCULAR; INTRAVENOUS at 14:10

## 2018-07-19 RX ADMIN — IOPAMIDOL 100 ML: 612 INJECTION, SOLUTION INTRAVENOUS at 13:55

## 2018-07-19 RX ADMIN — MORPHINE SULFATE 2 MG: 2 INJECTION, SOLUTION INTRAMUSCULAR; INTRAVENOUS at 23:23

## 2018-07-19 ASSESSMENT — PAIN SCALES - GENERAL
PAINLEVEL_OUTOF10: 8
PAINLEVEL_OUTOF10: 10
PAINLEVEL_OUTOF10: 10
PAINLEVEL_OUTOF10: 9
PAINLEVEL_OUTOF10: 2
PAINLEVEL_OUTOF10: 3

## 2018-07-19 ASSESSMENT — PAIN DESCRIPTION - DESCRIPTORS
DESCRIPTORS: SHARP;CONSTANT
DESCRIPTORS: ACHING

## 2018-07-19 ASSESSMENT — PAIN DESCRIPTION - ORIENTATION
ORIENTATION: UPPER;MID
ORIENTATION: MID

## 2018-07-19 ASSESSMENT — PAIN DESCRIPTION - PAIN TYPE
TYPE: ACUTE PAIN
TYPE: ACUTE PAIN

## 2018-07-19 ASSESSMENT — PAIN DESCRIPTION - LOCATION
LOCATION: ABDOMEN
LOCATION: ABDOMEN

## 2018-07-19 ASSESSMENT — PAIN DESCRIPTION - PROGRESSION: CLINICAL_PROGRESSION: RAPIDLY IMPROVING

## 2018-07-19 ASSESSMENT — PAIN DESCRIPTION - DIRECTION: RADIATING_TOWARDS: BACK

## 2018-07-19 ASSESSMENT — PAIN DESCRIPTION - FREQUENCY: FREQUENCY: INTERMITTENT

## 2018-07-19 NOTE — ED NOTES
Patients HR went from 58 to 110. No distress noted, patient still in pain. Elena BESS notified.      Madelin Meeks RN  07/19/18 0622

## 2018-07-19 NOTE — ED NOTES
Pt has been notified of cece hudson calling up to [facility] for [facility] and possible transfer.      Glenroy Stokes RN  07/19/18 1404

## 2018-07-19 NOTE — ED NOTES
Sisi hudson made aware of all results that are back and to go in to talk with patient and .        Rosibel Fregoso RN  07/19/18 3017

## 2018-07-19 NOTE — ED NOTES
Writer updated pt on plan of care and result wait time. Pt states her pain is much better. Respirations are easy and unlabored.        Nano Alarcon RN  07/19/18 1115

## 2018-07-20 PROBLEM — K92.2 ACUTE UPPER GI BLEEDING: Status: ACTIVE | Noted: 2018-07-20

## 2018-07-20 PROBLEM — E44.1 MILD MALNUTRITION (HCC): Status: ACTIVE | Noted: 2018-07-20

## 2018-07-20 LAB
ABSOLUTE EOS #: 0.1 K/UL (ref 0–0.44)
ABSOLUTE IMMATURE GRANULOCYTE: <0.03 K/UL (ref 0–0.3)
ABSOLUTE LYMPH #: 2.09 K/UL (ref 1.1–3.7)
ABSOLUTE MONO #: 0.37 K/UL (ref 0.1–1.2)
ALBUMIN SERPL-MCNC: 3.4 G/DL (ref 3.5–5.2)
ALBUMIN/GLOBULIN RATIO: 0.7 (ref 1–2.5)
ALP BLD-CCNC: 65 U/L (ref 35–104)
ALT SERPL-CCNC: 7 U/L (ref 5–33)
ANION GAP SERPL CALCULATED.3IONS-SCNC: 12 MMOL/L (ref 9–17)
AST SERPL-CCNC: 13 U/L
BASOPHILS # BLD: 0 % (ref 0–2)
BASOPHILS ABSOLUTE: <0.03 K/UL (ref 0–0.2)
BILIRUB SERPL-MCNC: 0.42 MG/DL (ref 0.3–1.2)
BUN BLDV-MCNC: 14 MG/DL (ref 8–23)
BUN/CREAT BLD: 20 (ref 9–20)
CALCIUM SERPL-MCNC: 9.4 MG/DL (ref 8.6–10.4)
CHLORIDE BLD-SCNC: 96 MMOL/L (ref 98–107)
CO2: 30 MMOL/L (ref 20–31)
CREAT SERPL-MCNC: 0.69 MG/DL (ref 0.5–0.9)
CULTURE: NORMAL
DIFFERENTIAL TYPE: ABNORMAL
DIRECT EXAM: POSITIVE
EKG ATRIAL RATE: 59 BPM
EKG ATRIAL RATE: 60 BPM
EKG ATRIAL RATE: 81 BPM
EKG P AXIS: 63 DEGREES
EKG P AXIS: 64 DEGREES
EKG P-R INTERVAL: 192 MS
EKG P-R INTERVAL: 208 MS
EKG Q-T INTERVAL: 326 MS
EKG Q-T INTERVAL: 404 MS
EKG Q-T INTERVAL: 438 MS
EKG QRS DURATION: 102 MS
EKG QRS DURATION: 102 MS
EKG QRS DURATION: 134 MS
EKG QTC CALCULATION (BAZETT): 438 MS
EKG QTC CALCULATION (BAZETT): 469 MS
EKG QTC CALCULATION (BAZETT): 486 MS
EKG R AXIS: 12 DEGREES
EKG R AXIS: 16 DEGREES
EKG R AXIS: 70 DEGREES
EKG T AXIS: 33 DEGREES
EKG T AXIS: 36 DEGREES
EKG T AXIS: 38 DEGREES
EKG VENTRICULAR RATE: 134 BPM
EKG VENTRICULAR RATE: 60 BPM
EKG VENTRICULAR RATE: 81 BPM
EOSINOPHILS RELATIVE PERCENT: 2 % (ref 1–4)
GFR AFRICAN AMERICAN: >60 ML/MIN
GFR NON-AFRICAN AMERICAN: >60 ML/MIN
GFR SERPL CREATININE-BSD FRML MDRD: ABNORMAL ML/MIN/{1.73_M2}
GFR SERPL CREATININE-BSD FRML MDRD: ABNORMAL ML/MIN/{1.73_M2}
GLUCOSE BLD-MCNC: 106 MG/DL (ref 70–99)
HCT VFR BLD CALC: 35.7 % (ref 36.3–47.1)
HCT VFR BLD CALC: 36.3 % (ref 36.3–47.1)
HEMOGLOBIN: 11.7 G/DL (ref 11.9–15.1)
HEMOGLOBIN: 11.8 G/DL (ref 11.9–15.1)
IMMATURE GRANULOCYTES: 0 %
LACTIC ACID, WHOLE BLOOD: NORMAL MMOL/L (ref 0.7–2.1)
LACTIC ACID: 0.8 MMOL/L (ref 0.5–2.2)
LYMPHOCYTES # BLD: 43 % (ref 24–43)
Lab: ABNORMAL
Lab: NORMAL
MCH RBC QN AUTO: 33.5 PG (ref 25.2–33.5)
MCHC RBC AUTO-ENTMCNC: 32.8 G/DL (ref 28.4–34.8)
MCV RBC AUTO: 102.3 FL (ref 82.6–102.9)
MONOCYTES # BLD: 8 % (ref 3–12)
NRBC AUTOMATED: 0 PER 100 WBC
PDW BLD-RTO: 12 % (ref 11.8–14.4)
PLATELET # BLD: 159 K/UL (ref 138–453)
PLATELET ESTIMATE: ABNORMAL
PMV BLD AUTO: 9.3 FL (ref 8.1–13.5)
POTASSIUM SERPL-SCNC: 4.1 MMOL/L (ref 3.7–5.3)
RBC # BLD: 3.49 M/UL (ref 3.95–5.11)
RBC # BLD: ABNORMAL 10*6/UL
SEG NEUTROPHILS: 47 % (ref 36–65)
SEGMENTED NEUTROPHILS ABSOLUTE COUNT: 2.29 K/UL (ref 1.5–8.1)
SODIUM BLD-SCNC: 138 MMOL/L (ref 135–144)
SPECIMEN DESCRIPTION: ABNORMAL
SPECIMEN DESCRIPTION: NORMAL
STATUS: ABNORMAL
STATUS: NORMAL
TOTAL PROTEIN: 8 G/DL (ref 6.4–8.3)
TROPONIN INTERP: NORMAL
TROPONIN T: <0.03 NG/ML
WBC # BLD: 4.9 K/UL (ref 3.5–11.3)
WBC # BLD: ABNORMAL 10*3/UL

## 2018-07-20 PROCEDURE — 99222 1ST HOSP IP/OBS MODERATE 55: CPT | Performed by: FAMILY MEDICINE

## 2018-07-20 PROCEDURE — 85025 COMPLETE CBC W/AUTO DIFF WBC: CPT

## 2018-07-20 PROCEDURE — 82271 OCCULT BLOOD OTHER SOURCES: CPT

## 2018-07-20 PROCEDURE — 83605 ASSAY OF LACTIC ACID: CPT

## 2018-07-20 PROCEDURE — 2580000003 HC RX 258: Performed by: FAMILY MEDICINE

## 2018-07-20 PROCEDURE — 93005 ELECTROCARDIOGRAM TRACING: CPT

## 2018-07-20 PROCEDURE — 2000000000 HC ICU R&B

## 2018-07-20 PROCEDURE — 85014 HEMATOCRIT: CPT

## 2018-07-20 PROCEDURE — 85018 HEMOGLOBIN: CPT

## 2018-07-20 PROCEDURE — 2500000003 HC RX 250 WO HCPCS: Performed by: FAMILY MEDICINE

## 2018-07-20 PROCEDURE — 6360000002 HC RX W HCPCS: Performed by: PHYSICIAN ASSISTANT

## 2018-07-20 PROCEDURE — 6370000000 HC RX 637 (ALT 250 FOR IP): Performed by: SURGERY

## 2018-07-20 PROCEDURE — 36415 COLL VENOUS BLD VENIPUNCTURE: CPT

## 2018-07-20 PROCEDURE — C9113 INJ PANTOPRAZOLE SODIUM, VIA: HCPCS | Performed by: PHYSICIAN ASSISTANT

## 2018-07-20 PROCEDURE — 99221 1ST HOSP IP/OBS SF/LOW 40: CPT | Performed by: SURGERY

## 2018-07-20 PROCEDURE — 84484 ASSAY OF TROPONIN QUANT: CPT

## 2018-07-20 PROCEDURE — 2580000003 HC RX 258: Performed by: PHYSICIAN ASSISTANT

## 2018-07-20 PROCEDURE — 80053 COMPREHEN METABOLIC PANEL: CPT

## 2018-07-20 RX ORDER — DILTIAZEM HYDROCHLORIDE 5 MG/ML
10 INJECTION INTRAVENOUS EVERY 8 HOURS
Status: DISCONTINUED | OUTPATIENT
Start: 2018-07-20 | End: 2018-07-21

## 2018-07-20 RX ORDER — KETOROLAC TROMETHAMINE 15 MG/ML
15 INJECTION, SOLUTION INTRAMUSCULAR; INTRAVENOUS EVERY 6 HOURS PRN
Status: DISCONTINUED | OUTPATIENT
Start: 2018-07-20 | End: 2018-07-22 | Stop reason: HOSPADM

## 2018-07-20 RX ORDER — SUCRALFATE 1 G/1
1 TABLET ORAL EVERY 6 HOURS SCHEDULED
Status: DISCONTINUED | OUTPATIENT
Start: 2018-07-20 | End: 2018-07-22 | Stop reason: HOSPADM

## 2018-07-20 RX ORDER — PANTOPRAZOLE SODIUM 40 MG/10ML
40 INJECTION, POWDER, LYOPHILIZED, FOR SOLUTION INTRAVENOUS 2 TIMES DAILY
Status: DISCONTINUED | OUTPATIENT
Start: 2018-07-20 | End: 2018-07-20

## 2018-07-20 RX ORDER — ACETAMINOPHEN 650 MG/1
650 SUPPOSITORY RECTAL EVERY 4 HOURS PRN
Status: DISCONTINUED | OUTPATIENT
Start: 2018-07-20 | End: 2018-07-22 | Stop reason: HOSPADM

## 2018-07-20 RX ADMIN — SODIUM CHLORIDE, POTASSIUM CHLORIDE, SODIUM LACTATE AND CALCIUM CHLORIDE: 600; 310; 30; 20 INJECTION, SOLUTION INTRAVENOUS at 07:18

## 2018-07-20 RX ADMIN — METOPROLOL TARTRATE 5 MG: 5 INJECTION, SOLUTION INTRAVENOUS at 01:08

## 2018-07-20 RX ADMIN — SUCRALFATE 1 G: 1 TABLET ORAL at 19:36

## 2018-07-20 RX ADMIN — Medication 10 ML: at 16:48

## 2018-07-20 RX ADMIN — Medication 10 ML: at 14:02

## 2018-07-20 RX ADMIN — DILTIAZEM HYDROCHLORIDE 10 MG: 5 INJECTION INTRAVENOUS at 22:50

## 2018-07-20 RX ADMIN — METOPROLOL TARTRATE 5 MG: 5 INJECTION, SOLUTION INTRAVENOUS at 16:48

## 2018-07-20 RX ADMIN — KETOROLAC TROMETHAMINE 15 MG: 15 INJECTION, SOLUTION INTRAMUSCULAR; INTRAVENOUS at 14:02

## 2018-07-20 RX ADMIN — Medication 10 ML: at 21:04

## 2018-07-20 RX ADMIN — KETOROLAC TROMETHAMINE 15 MG: 15 INJECTION, SOLUTION INTRAMUSCULAR; INTRAVENOUS at 07:41

## 2018-07-20 RX ADMIN — SODIUM CHLORIDE, POTASSIUM CHLORIDE, SODIUM LACTATE AND CALCIUM CHLORIDE: 600; 310; 30; 20 INJECTION, SOLUTION INTRAVENOUS at 15:39

## 2018-07-20 RX ADMIN — SODIUM CHLORIDE 8 MG/HR: 9 INJECTION, SOLUTION INTRAVENOUS at 17:44

## 2018-07-20 RX ADMIN — METOPROLOL TARTRATE 5 MG: 5 INJECTION, SOLUTION INTRAVENOUS at 07:38

## 2018-07-20 RX ADMIN — SODIUM CHLORIDE 8 MG/HR: 9 INJECTION, SOLUTION INTRAVENOUS at 09:26

## 2018-07-20 RX ADMIN — SODIUM CHLORIDE, POTASSIUM CHLORIDE, SODIUM LACTATE AND CALCIUM CHLORIDE: 600; 310; 30; 20 INJECTION, SOLUTION INTRAVENOUS at 22:54

## 2018-07-20 ASSESSMENT — PAIN DESCRIPTION - PAIN TYPE
TYPE: ACUTE PAIN

## 2018-07-20 ASSESSMENT — PAIN DESCRIPTION - DESCRIPTORS
DESCRIPTORS: HEADACHE

## 2018-07-20 ASSESSMENT — PAIN SCALES - GENERAL
PAINLEVEL_OUTOF10: 8
PAINLEVEL_OUTOF10: 7
PAINLEVEL_OUTOF10: 9
PAINLEVEL_OUTOF10: 5
PAINLEVEL_OUTOF10: 4

## 2018-07-20 ASSESSMENT — ENCOUNTER SYMPTOMS
SHORTNESS OF BREATH: 0
VOMITING: 0
NAUSEA: 0

## 2018-07-20 ASSESSMENT — PAIN DESCRIPTION - PROGRESSION: CLINICAL_PROGRESSION: GRADUALLY WORSENING

## 2018-07-20 ASSESSMENT — PAIN DESCRIPTION - LOCATION
LOCATION: HEAD

## 2018-07-20 ASSESSMENT — PAIN DESCRIPTION - ORIENTATION: ORIENTATION: MID

## 2018-07-20 ASSESSMENT — PAIN DESCRIPTION - FREQUENCY
FREQUENCY: CONTINUOUS
FREQUENCY: CONTINUOUS

## 2018-07-20 NOTE — PROGRESS NOTES
Verbal order from [facility] to hold off on lovenox and start the patient on SCD's

## 2018-07-20 NOTE — PROGRESS NOTES
Discussed discharge plans with the patient. Patient is a 74year old female here with  Acute abdominal pain. She is alert and oriented. Patient is  and lives at home with her . She uses no medical equipment. Patient does her cooking and cleaning. She manages her own medications and drives. Her PCP is Dr. Zaynab Ingram. She has medical insurance that helps with medication costs. The discharge plan is home with no services at this time.     ANNALISE Lozada

## 2018-07-20 NOTE — PROGRESS NOTES
Nutrition Assessment    Type and Reason for Visit: Initial    Nutrition Recommendations:  Resume nutrition as GI feasible vs alternative nutrition    Malnutrition Assessment:  · Malnutrition Status: Mild Malnutrition  · Context: Acute illness or injury  · Findings of the 6 clinical characteristics of malnutrition (Minimum of 2 out of 6 clinical characteristics is required to make the diagnosis of moderate or severe Protein Calorie Malnutrition based on AND/ASPEN Guidelines):  1. Energy Intake-Less than or equal to 50%,  (24 hours)    2. Weight Loss-No significant weight loss,    3. Fat Loss-Mild subcutaneous fat loss, Orbital  4. Muscle Loss-Mild muscle mass loss, Temples (temporalis muscle), Clavicles (pectoralis and deltoids)  5. Fluid Accumulation-Mild fluid accumulation, Extremities  6.  Strength-Not measured    Nutrition Diagnosis:   · Problem: Predicted suboptimal energy intake  · Etiology: related to Alteration in GI function     Signs and symptoms:  as evidenced by NPO status due to medical condition    Nutrition Assessment:  · Subjective Assessment: Last meal 10 am yesterday (7/19). No abdominal gurgling or flatus reported. No usual appetite or intake issues. No special diet pta. · Nutrition-Focused Physical Findings: Thin and drawn appearing.    · Wound Type: None  · Current Nutrition Therapies:  · Oral Diet Orders: NPO   · Oral Diet intake: NPO  · Oral Nutrition Supplement (ONS) Orders: None  · Anthropometric Measures:  · Ht: 5' 6\" (167.6 cm)   · Current Body Wt: 152 lb 3.2 oz (69 kg)  · Admission Body Wt: 150 lb (68 kg)  · Usual Body Wt: 150 lb (68 kg)  · % Weight Change: 1.4% gain,  acute with ivf  · Ideal Body Wt: 130 lb (59 kg), % Ideal Body 117%  · BMI Classification: BMI 18.5 - 24.9 Normal Weight  · Comparative Standards (Estimated Nutrition Needs):  · Estimated Daily Total Kcal: 0036-8076  · Estimated Daily Protein (g): 82-89    Lab Results   Component Value Date     07/20/2018    K 4.1 07/20/2018    CL 96 (L) 07/20/2018    CO2 30 07/20/2018    BUN 14 07/20/2018    CREATININE 0.69 07/20/2018    GLUCOSE 106 (H) 07/20/2018    CALCIUM 9.4 07/20/2018    PROT 8.0 07/20/2018    LABALBU 3.4 (L) 07/20/2018    BILITOT 0.42 07/20/2018    ALKPHOS 65 07/20/2018    AST 13 07/20/2018    ALT 7 07/20/2018    LABGLOM >60 07/20/2018    GFRAA >60 07/20/2018     Lab Results   Component Value Date    LABA1C 5.0 08/16/2017     Lab Results   Component Value Date    EAG 97 08/16/2017     No results found for: VITD25    Estimated Intake vs Estimated Needs: Intake Less Than Needs    Nutrition Risk Level: High, Moderate    Potential for extended NPO periods r/t current condition. Has physical signs of mild malnutrition, despite a lack of body weight changes (loss) over time. + bs per nursing notes, but no flatus per patient. NGT has drained another 300 ml since recorded 450 ml 7/19. Desire for resumption of nutrition in <7 days from yesterday on solid PO with tolerance, to meet nutrition needs.      Nutrition Interventions:   Continue NPO  Continued Inpatient Monitoring    Nutrition Evaluation:   · Evaluation: Goals set   · Goals: PO>75% of advanced diet    · Monitoring: Meal Intake, Pertinent Labs, Weight, Ascites/Edema    Electronically signed by Yudy Mari RD, LD on 7/20/18 at 7:44 AM    Contact Number: [id number]

## 2018-07-20 NOTE — PROGRESS NOTES
Admitted to 306 per cart from ER  With monitors. Up to BR prior to getting into bed. Gait steady. R nares with NG tube in place. Bleeding noted at nostril. C/O mid abdominal pain that radiates to the back. Rates as 2. Denies nausea. C/O frontal headache. Rates as a #8. Lungs clear. Heart tones strong and slightly irregular. Monitor shows SA in 60's.

## 2018-07-20 NOTE — CONSULTS
Inocente Kincaid is an 74 y. o.female. Allergies: Allergies   Allergen Reactions    Coricidin Hbp Cold-Flu [Chlorpheniramine-Acetaminophen]        Active Problems:    Acute abdominal pain  Resolved Problems:    * No resolved hospital problems. *    Blood pressure (!) 178/63, pulse 65, temperature 97.7 °F (36.5 °C), temperature source Temporal, resp. rate 9, height 5' 6\" (1.676 m), weight 152 lb 3.2 oz (69 kg), SpO2 96 %. HPI    Ms Louisa Bhandari is a pleasant 73 yo WF who presents through [facility] for evaluation of severe abdominal pain. It began suddenly earlier today, epigastric, now generalized. She was hemodynamically stable on admission, with unremarkable labs. CTA chest, abd, pelvis showed no aortic dissection. Celiac artery stenosis is present, but with good down stream flow. Dilated loops of small bowel noted. NGT was placed, with several hundred cc of coffee grounds aspirate. She subsequently felt much improved. SVT occurred with NGT placement, self limited. Previous colonoscopy a few years ago was normal, as she recalls. No prior EGD. Never treated for peptic ulcer disease. Recently quit tobacco use 3 months ago. Glass of wine each evening. 3-4 cups of coffee daily. At this time, she is resting comfortably. Pain is still present, but much improved. Review of Systems   Constitutional: Positive for malaise/fatigue. Negative for chills, diaphoresis and fever. HENT: Negative for nosebleeds and sore throat. Eyes: Negative for redness. Respiratory: Negative for cough. Cardiovascular: Negative for chest pain, palpitations and leg swelling. Gastrointestinal: Positive for abdominal pain, heartburn, nausea and vomiting. Negative for blood in stool. Genitourinary: Negative for dysuria, flank pain and hematuria. Musculoskeletal: Negative for back pain. Neurological: Positive for headaches. Negative for dizziness, focal weakness, seizures and weakness.    Endo/Heme/Allergies: Does rhythm  Vent. rate has increased BY  84 BPM   EKG 12 Lead [[id number]] Collected: 07/19/18 1911   Updated: 07/19/18 2247     Ventricular Rate 81 BPM    Atrial Rate 81 BPM    P-R Interval 192 ms    QRS Duration 102 ms    Q-T Interval 404 ms    QTc Calculation (Bazett) 469 ms    P Axis 64 degrees    R Axis 12 degrees    T Axis 38 degrees   Narrative:     Normal sinus rhythm  Possible Left atrial enlargement  Borderline ECG  When compared with ECG of 19-JUL-2018 18:43, (unconfirmed)  Sinus rhythm has replaced Wide QRS tachycardia  Vent. rate has decreased BY  53 BPM   EKG REPORT [[id number]] Resulted: 07/19/18 1328   Updated: 07/19/18 2117    EKG 12 Lead [[id number]] Collected: 07/19/18 1328   Updated: 07/19/18 2117     Ventricular Rate 50 BPM    Atrial Rate 50 BPM    P-R Interval 188 ms    QRS Duration 94 ms    Q-T Interval 454 ms    QTc Calculation (Bazett) 413 ms    P Axis 31 degrees    R Axis 0 degrees    T Axis 34 degrees   Narrative:     Sinus bradycardia  Cannot rule out Anterior infarct , age undetermined  Abnormal ECG  When compared with ECG of 16-AUG-2017 05:46,  [place] interval has decreased  T wave inversion now evident in Anterior leads  Confirmed by Kailey Sanabria ([id number]) on 7/19/2018 9:16:49 PM   Troponin [[id number]] Collected: 07/19/18 1717   Updated: 07/19/18 1738    Specimen Source: Blood     Troponin T <0.03 ng/mL    Comment: Troponin T results cannot be compared to Troponin-I results. Troponin Interp         Comment: Reference Range:    <0.03     Within reference range.    0.03-0.09 Possible myocardial damage. Repeat at appropriate intervals to rule out chronic elevation.    >= 0.10   Indicative of myocardial damage.         Patients with high levels of Biotin oral intake (i.e >5mg/day) may have falsely    decreased Troponin T levels. Samples collected within 8 hours of biotin intake    may require additional information for diagnosis.        Lactic acid, plasma [[id number]] Collected: 07/19/18 Narrative:     FINAL REPORT    EXAM:  CTA ABDOMEN PELVIS W CONTRAST    HISTORY:  Tearing pain abdomen and back     TECHNIQUE:  CTA examination of the ABDOMEN after IV contrast    CTA examination of the PELVIS after IV contrast    3D vascular reconstructions     PRIORS:  Chest CTA 7/19/2018    FINDINGS:    Degenerative change in the regional skeleton. Rotatory lumbar curvature with upper left apex. No evidence of vertebral compression fracture. Slight pectus excavatum. Normal-appearing liver, gallbladder, adrenals, pancreas, and spleen. 2D and 3D  images of the abdominal aorta demonstrates no evidence of dissection or aneurysm. There is moderate calcified and noncalcified atherosclerotic plaque throughout the aorta and its branches. IV contrast density drop-off is suggestive of short-segment high-grade stenosis at the origin of the celiac artery, greater than 90 % diameter. Approximately 60 % diameter stenosis at the right common iliac artery origin. No other significant stenosis. Normal caliber IVC. Nonspecific, smoothly marginated, low density, simple appearing left renal lesions are statistically most likely cysts. These are too small to characterize. No visible renal calculus or hydronephrosis. No calculus or hydronephrosis in   partially visual ureters. Intact abdominal wall. No retroperitoneal adenopathy. No mesenteric mass. Normal-appearing stomach and duodenum. Nonspecific caliber prominence of small bowel caliber in the abdomen and pelvis may reflect paralytic ileus. Multiple small fluid levels are noted within relatively fluid-filled small bowel in the pelvis. No pelvic free fluid. Normal-appearing urinary bladder adnexae, and rectum. Uterus not visible. No gross ascites, free air, or colonic distention. Normal-appearing cecum, terminal ileum, and appendix. Slight sigmoid diverticulosis without evidence of diverticulitis.     Impression:     Impression:    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 Approximately 60 % diameter stenosis at the right common iliac artery origin     Rotatory lumbar curvature with upper left apex     Slight pectus excavatum     Moderate calcified atherosclerotic plaque throughout the aorta and its branches, extending into the iliac and femoral arteries     Nonspecific caliber and fluid prominence in multiple loops of small bowel may reflect paralytic ileus and/or enteritis    Slight sigmoid diverticulosis       Electronically Signed By: Dong Uriarte   on  07/19/2018  16:23     XR CHEST 1 VW [[id number]] Collected: 07/19/18 1434   Updated: 07/19/18 1453    Narrative:     REPORT: Portable chest.    INDICATION: Chest and abdominal pain. FINDINGS: A portable erect view of the chest, taken at 1421 hours, shows interval blunting of the right costophrenic angle since the study of 8/15/2017, suspicious for a small pleural effusion. This could be due to focal atelectatic change or interval   scarring. There may be small hilar and parenchymal calcifications from remote granulomatous disease. The lung fields and left costophrenic angle are otherwise clear. The heart is not enlarged. There is no evidence of a pneumothorax or free air beneath   the diaphragm. Final report electronically signed by Wilton Lafleur on 7/19/2018 2:51 PM   Impression:     INTERVAL BLUNTING OF THE RIGHT COSTOPHRENIC ANGLE SINCE THE STUDY OF 8/15/2017. THIS MAY BE DUE TO A SMALL PLEURAL EFFUSION AND OR FOCAL ATELECTATIC CHANGE OR INTERVAL SCARRING. THE LUNGS ARE OTHERWISE CLEAR.    Urine Culture [[id number]] Collected: 07/19/18 1253   Updated: 07/19/18 3691    Specimen Source: Urine, clean catch    Urinalysis with Microscopic [[id number]] (Abnormal) Collected: 07/19/18 1253   Updated: 07/19/18 1318    Specimen Source: Urine, clean catch     Color, UA YELLOW    Turbidity UA SLIGHTLY CLOUDY (A)    Glucose, Ur NEGATIVE    Bilirubin Urine MCH 34.0 (H) pg    MCHC 34.5 g/dL    RDW 11.9 %    Platelets 059 k/uL    MPV 9.3 fL             Assessment:    73 yo WF with acute onset abdominal pain, upper GI bleed. Plan:    Ms Nestor Can is currently stable. Agree with hospital admission, bowel rest, NGT, IVF, supportive care. Will plan outpatient EGD this next week. Consider EGD prior to D/C home if her symptoms or GI bleeding fail to quickly resolve.     Rg Hunt MD  7/20/2018

## 2018-07-20 NOTE — PLAN OF CARE
Problem: Pain:  Goal: Pain level will decrease  Pain level will decrease   Outcome: Ongoing  Morphine 2 mg IVP given for pain in head and abdomen. C/O itching following administration. No rash noted. Will keep an eye on situation. Problem: Bowel/Gastric:  Goal: Control of bowel function will improve  Control of bowel function will improve  Outcome: Ongoing  Blood cultures drawn x 2. Tolerated well. C/O HA. Rates as #8. Abdominal pain #2; Describes as soreness that radiates to back. Much improved since NG placement. NG continued to low gomco.  Draining brown liquid. Bleeding noted at right nares.

## 2018-07-20 NOTE — H&P
[facility]  History and Physical        Patient:  Radha Bhagat  MRN: [id number]    Chief Complaint:  Abdominal pain    History Obtained From:  patient, electronic medical record    PCP: Makenna Connolly MD    History of Present Illness: The patient is a 74 y.o. female who presents with sudden onset of epigastric pain associated with nausea, came to er and subsequent w/u showed dilated small bowel loops on ct and has coeliac artery stenosis-90%. Vascualr surgeon was contacted and advised that her symptoms probably not due to vascular problem but from small bowel obstructon and advised gen surgery evaluation. Dr Khalif Gipson was contcted by er and advised ng tube and admission. Past Medical History:        Diagnosis Date    Abnormal patient-activated cardiac event monitor 09/06/2017    sinus tachycardia with PSVT lasting 36 sec @ 165 bpm.    H/O echocardiogram 08/15/2017    EF 60%. Mildly increased LV wall thickness normal LV cavity size. No definite specific wall motion abnormalities were identified. No significant valvular abnormalities Mild diastolic dysfunction is seen.  Hypertension        Past Surgical History:        Procedure Laterality Date    CARDIAC [facility] Left [facility]16/2017    [facility]    HYSTERECTOMY  1996       Family History:   No family history on file. Social History:   TOBACCO:   reports that she has been smoking Cigarettes. She has been smoking about 1.00 pack per day. She has never used smokeless tobacco.  ETOH:   reports that she drinks alcohol. OCCUPATION: none    Allergies:  Coricidin hbp cold-flu [chlorpheniramine-acetaminophen]    Medications Prior to Admission:    Prior to Admission medications    Medication Sig Start Date End Date Taking?  Authorizing Provider   atorvastatin (LIPITOR) 10 MG tablet Take 1 tablet by mouth daily 12/13/17  Yes Velia Merrill MD   diltiazem (CARDIZEM CD) 120 MG extended release capsule

## 2018-07-21 ENCOUNTER — ANESTHESIA EVENT (OUTPATIENT)
Dept: OPERATING ROOM | Age: 68
DRG: 378 | End: 2018-07-21
Payer: MEDICARE

## 2018-07-21 ENCOUNTER — ANESTHESIA (OUTPATIENT)
Dept: OPERATING ROOM | Age: 68
DRG: 378 | End: 2018-07-21
Payer: MEDICARE

## 2018-07-21 VITALS — SYSTOLIC BLOOD PRESSURE: 176 MMHG | OXYGEN SATURATION: 98 % | DIASTOLIC BLOOD PRESSURE: 63 MMHG

## 2018-07-21 PROBLEM — K29.60 EROSIVE GASTRITIS: Status: ACTIVE | Noted: 2018-07-21

## 2018-07-21 LAB
ABSOLUTE EOS #: 0.1 K/UL (ref 0–0.44)
ABSOLUTE IMMATURE GRANULOCYTE: <0.03 K/UL (ref 0–0.3)
ABSOLUTE LYMPH #: 1.57 K/UL (ref 1.1–3.7)
ABSOLUTE MONO #: 0.45 K/UL (ref 0.1–1.2)
ALBUMIN SERPL-MCNC: 3.1 G/DL (ref 3.5–5.2)
ALBUMIN/GLOBULIN RATIO: 0.7 (ref 1–2.5)
ALP BLD-CCNC: 58 U/L (ref 35–104)
ALT SERPL-CCNC: 6 U/L (ref 5–33)
ANION GAP SERPL CALCULATED.3IONS-SCNC: 12 MMOL/L (ref 9–17)
AST SERPL-CCNC: 12 U/L
BASOPHILS # BLD: 0 % (ref 0–2)
BASOPHILS ABSOLUTE: <0.03 K/UL (ref 0–0.2)
BILIRUB SERPL-MCNC: 0.64 MG/DL (ref 0.3–1.2)
BUN BLDV-MCNC: 14 MG/DL (ref 8–23)
BUN/CREAT BLD: 23 (ref 9–20)
CALCIUM SERPL-MCNC: 9.1 MG/DL (ref 8.6–10.4)
CHLORIDE BLD-SCNC: 98 MMOL/L (ref 98–107)
CO2: 30 MMOL/L (ref 20–31)
CREAT SERPL-MCNC: 0.6 MG/DL (ref 0.5–0.9)
DIFFERENTIAL TYPE: ABNORMAL
EOSINOPHILS RELATIVE PERCENT: 2 % (ref 1–4)
GFR AFRICAN AMERICAN: >60 ML/MIN
GFR NON-AFRICAN AMERICAN: >60 ML/MIN
GFR SERPL CREATININE-BSD FRML MDRD: ABNORMAL ML/MIN/{1.73_M2}
GFR SERPL CREATININE-BSD FRML MDRD: ABNORMAL ML/MIN/{1.73_M2}
GLUCOSE BLD-MCNC: 86 MG/DL (ref 70–99)
HCT VFR BLD CALC: 36.5 % (ref 36.3–47.1)
HEMOGLOBIN: 11.8 G/DL (ref 11.9–15.1)
IMMATURE GRANULOCYTES: 0 %
LYMPHOCYTES # BLD: 26 % (ref 24–43)
MCH RBC QN AUTO: 33.7 PG (ref 25.2–33.5)
MCHC RBC AUTO-ENTMCNC: 32.3 G/DL (ref 28.4–34.8)
MCV RBC AUTO: 104.3 FL (ref 82.6–102.9)
MONOCYTES # BLD: 8 % (ref 3–12)
NRBC AUTOMATED: 0 PER 100 WBC
PDW BLD-RTO: 11.9 % (ref 11.8–14.4)
PLATELET # BLD: 125 K/UL (ref 138–453)
PLATELET ESTIMATE: ABNORMAL
PMV BLD AUTO: 9.7 FL (ref 8.1–13.5)
POTASSIUM SERPL-SCNC: 4 MMOL/L (ref 3.7–5.3)
RBC # BLD: 3.5 M/UL (ref 3.95–5.11)
RBC # BLD: ABNORMAL 10*6/UL
SEG NEUTROPHILS: 64 % (ref 36–65)
SEGMENTED NEUTROPHILS ABSOLUTE COUNT: 3.88 K/UL (ref 1.5–8.1)
SODIUM BLD-SCNC: 140 MMOL/L (ref 135–144)
TOTAL PROTEIN: 7.7 G/DL (ref 6.4–8.3)
WBC # BLD: 6 K/UL (ref 3.5–11.3)
WBC # BLD: ABNORMAL 10*3/UL

## 2018-07-21 PROCEDURE — 2709999900 HC NON-CHARGEABLE SUPPLY: Performed by: SURGERY

## 2018-07-21 PROCEDURE — 3700000000 HC ANESTHESIA ATTENDED CARE: Performed by: SURGERY

## 2018-07-21 PROCEDURE — 6360000002 HC RX W HCPCS: Performed by: PHYSICIAN ASSISTANT

## 2018-07-21 PROCEDURE — 3700000001 HC ADD 15 MINUTES (ANESTHESIA): Performed by: SURGERY

## 2018-07-21 PROCEDURE — 6370000000 HC RX 637 (ALT 250 FOR IP): Performed by: SURGERY

## 2018-07-21 PROCEDURE — 80053 COMPREHEN METABOLIC PANEL: CPT

## 2018-07-21 PROCEDURE — 1200000000 HC SEMI PRIVATE

## 2018-07-21 PROCEDURE — 2500000003 HC RX 250 WO HCPCS: Performed by: FAMILY MEDICINE

## 2018-07-21 PROCEDURE — 6360000002 HC RX W HCPCS: Performed by: NURSE ANESTHETIST, CERTIFIED REGISTERED

## 2018-07-21 PROCEDURE — 7100000001 HC PACU RECOVERY - ADDTL 15 MIN: Performed by: SURGERY

## 2018-07-21 PROCEDURE — 2580000003 HC RX 258: Performed by: PHYSICIAN ASSISTANT

## 2018-07-21 PROCEDURE — 3609012400 HC EGD TRANSORAL BIOPSY SINGLE/MULTIPLE: Performed by: SURGERY

## 2018-07-21 PROCEDURE — 85025 COMPLETE CBC W/AUTO DIFF WBC: CPT

## 2018-07-21 PROCEDURE — C1773 RET DEV, INSERTABLE: HCPCS | Performed by: SURGERY

## 2018-07-21 PROCEDURE — 7100000000 HC PACU RECOVERY - FIRST 15 MIN: Performed by: SURGERY

## 2018-07-21 PROCEDURE — 0DB78ZX EXCISION OF STOMACH, PYLORUS, VIA NATURAL OR ARTIFICIAL OPENING ENDOSCOPIC, DIAGNOSTIC: ICD-10-PCS | Performed by: SURGERY

## 2018-07-21 PROCEDURE — 36415 COLL VENOUS BLD VENIPUNCTURE: CPT

## 2018-07-21 PROCEDURE — 2500000003 HC RX 250 WO HCPCS: Performed by: NURSE ANESTHETIST, CERTIFIED REGISTERED

## 2018-07-21 PROCEDURE — C9113 INJ PANTOPRAZOLE SODIUM, VIA: HCPCS | Performed by: PHYSICIAN ASSISTANT

## 2018-07-21 PROCEDURE — 2580000003 HC RX 258: Performed by: FAMILY MEDICINE

## 2018-07-21 PROCEDURE — 88305 TISSUE EXAM BY PATHOLOGIST: CPT

## 2018-07-21 RX ORDER — KETAMINE HYDROCHLORIDE 100 MG/ML
INJECTION, SOLUTION INTRAMUSCULAR; INTRAVENOUS PRN
Status: DISCONTINUED | OUTPATIENT
Start: 2018-07-21 | End: 2018-07-21 | Stop reason: SDUPTHER

## 2018-07-21 RX ORDER — FENTANYL CITRATE 50 UG/ML
INJECTION, SOLUTION INTRAMUSCULAR; INTRAVENOUS PRN
Status: DISCONTINUED | OUTPATIENT
Start: 2018-07-21 | End: 2018-07-21 | Stop reason: SDUPTHER

## 2018-07-21 RX ORDER — LIDOCAINE HYDROCHLORIDE 20 MG/ML
INJECTION, SOLUTION INFILTRATION; PERINEURAL PRN
Status: DISCONTINUED | OUTPATIENT
Start: 2018-07-21 | End: 2018-07-21 | Stop reason: SDUPTHER

## 2018-07-21 RX ORDER — MIDAZOLAM HYDROCHLORIDE 1 MG/ML
INJECTION INTRAMUSCULAR; INTRAVENOUS PRN
Status: DISCONTINUED | OUTPATIENT
Start: 2018-07-21 | End: 2018-07-21 | Stop reason: SDUPTHER

## 2018-07-21 RX ORDER — DILTIAZEM HYDROCHLORIDE 5 MG/ML
10 INJECTION INTRAVENOUS EVERY 6 HOURS PRN
Status: DISCONTINUED | OUTPATIENT
Start: 2018-07-21 | End: 2018-07-22 | Stop reason: HOSPADM

## 2018-07-21 RX ORDER — LABETALOL HYDROCHLORIDE 5 MG/ML
5 INJECTION, SOLUTION INTRAVENOUS EVERY 10 MIN PRN
Status: DISCONTINUED | OUTPATIENT
Start: 2018-07-21 | End: 2018-07-21

## 2018-07-21 RX ORDER — ENALAPRILAT 2.5 MG/2ML
1.25 INJECTION INTRAVENOUS EVERY 6 HOURS PRN
Status: DISCONTINUED | OUTPATIENT
Start: 2018-07-21 | End: 2018-07-22 | Stop reason: HOSPADM

## 2018-07-21 RX ORDER — ONDANSETRON 2 MG/ML
4 INJECTION INTRAMUSCULAR; INTRAVENOUS
Status: DISCONTINUED | OUTPATIENT
Start: 2018-07-21 | End: 2018-07-21

## 2018-07-21 RX ORDER — PROPOFOL 10 MG/ML
INJECTION, EMULSION INTRAVENOUS PRN
Status: DISCONTINUED | OUTPATIENT
Start: 2018-07-21 | End: 2018-07-21 | Stop reason: SDUPTHER

## 2018-07-21 RX ORDER — FENTANYL CITRATE 50 UG/ML
50 INJECTION, SOLUTION INTRAMUSCULAR; INTRAVENOUS EVERY 5 MIN PRN
Status: DISCONTINUED | OUTPATIENT
Start: 2018-07-21 | End: 2018-07-21

## 2018-07-21 RX ORDER — FENTANYL CITRATE 50 UG/ML
25 INJECTION, SOLUTION INTRAMUSCULAR; INTRAVENOUS EVERY 5 MIN PRN
Status: DISCONTINUED | OUTPATIENT
Start: 2018-07-21 | End: 2018-07-21

## 2018-07-21 RX ORDER — METOCLOPRAMIDE HYDROCHLORIDE 5 MG/ML
10 INJECTION INTRAMUSCULAR; INTRAVENOUS
Status: DISCONTINUED | OUTPATIENT
Start: 2018-07-21 | End: 2018-07-21

## 2018-07-21 RX ADMIN — SODIUM CHLORIDE 8 MG/HR: 9 INJECTION, SOLUTION INTRAVENOUS at 17:34

## 2018-07-21 RX ADMIN — SUCRALFATE 1 G: 1 TABLET ORAL at 23:46

## 2018-07-21 RX ADMIN — SUCRALFATE 1 G: 1 TABLET ORAL at 18:30

## 2018-07-21 RX ADMIN — PROPOFOL 30 MG: 10 INJECTION, EMULSION INTRAVENOUS at 11:17

## 2018-07-21 RX ADMIN — SODIUM CHLORIDE 8 MG/HR: 9 INJECTION, SOLUTION INTRAVENOUS at 03:53

## 2018-07-21 RX ADMIN — PROPOFOL 20 MG: 10 INJECTION, EMULSION INTRAVENOUS at 11:19

## 2018-07-21 RX ADMIN — METOPROLOL TARTRATE 5 MG: 5 INJECTION, SOLUTION INTRAVENOUS at 03:58

## 2018-07-21 RX ADMIN — FENTANYL CITRATE 50 MCG: 50 INJECTION INTRAMUSCULAR; INTRAVENOUS at 11:15

## 2018-07-21 RX ADMIN — SUCRALFATE 1 G: 1 TABLET ORAL at 00:20

## 2018-07-21 RX ADMIN — KETAMINE HYDROCHLORIDE 30 MG: 100 INJECTION INTRAMUSCULAR; INTRAVENOUS at 11:17

## 2018-07-21 RX ADMIN — DILTIAZEM HYDROCHLORIDE 10 MG: 5 INJECTION INTRAVENOUS at 12:35

## 2018-07-21 RX ADMIN — ENALAPRILAT 1.25 MG: 1.25 INJECTION INTRAVENOUS at 07:50

## 2018-07-21 RX ADMIN — MIDAZOLAM HYDROCHLORIDE 1 MG: 1 INJECTION, SOLUTION INTRAMUSCULAR; INTRAVENOUS at 11:15

## 2018-07-21 RX ADMIN — SUCRALFATE 1 G: 1 TABLET ORAL at 05:51

## 2018-07-21 RX ADMIN — LIDOCAINE HYDROCHLORIDE 100 MG: 20 INJECTION, SOLUTION INFILTRATION; PERINEURAL at 11:17

## 2018-07-21 RX ADMIN — SODIUM CHLORIDE, POTASSIUM CHLORIDE, SODIUM LACTATE AND CALCIUM CHLORIDE: 600; 310; 30; 20 INJECTION, SOLUTION INTRAVENOUS at 06:36

## 2018-07-21 RX ADMIN — SODIUM CHLORIDE, POTASSIUM CHLORIDE, SODIUM LACTATE AND CALCIUM CHLORIDE: 600; 310; 30; 20 INJECTION, SOLUTION INTRAVENOUS at 14:29

## 2018-07-21 ASSESSMENT — PAIN SCALES - GENERAL
PAINLEVEL_OUTOF10: 8
PAINLEVEL_OUTOF10: 0
PAINLEVEL_OUTOF10: 6
PAINLEVEL_OUTOF10: 0

## 2018-07-21 ASSESSMENT — PULMONARY FUNCTION TESTS
PIF_VALUE: 1

## 2018-07-21 ASSESSMENT — PAIN DESCRIPTION - LOCATION
LOCATION: HEAD
LOCATION: HEAD

## 2018-07-21 ASSESSMENT — PAIN DESCRIPTION - PAIN TYPE
TYPE: ACUTE PAIN
TYPE: ACUTE PAIN

## 2018-07-21 ASSESSMENT — PAIN DESCRIPTION - DESCRIPTORS: DESCRIPTORS: HEADACHE

## 2018-07-21 NOTE — PROGRESS NOTES
Monitor shows sinus tachycardia with rate 120-130 upon return from EGD. Cardizem 10 mi IV given per Michelle Crystal. Monitor shows immediate conversion to NSR with rate 60's. Patient resting in bed. States \"very hungry\".

## 2018-07-21 NOTE — CONSULTS
[facility]/ [address] [place] [address]    Social History:  Social History   Substance Use Topics    Smoking status: Former Smoker     Packs/day: 1.00     Types: Cigarettes     Quit date: 4/20/2018    Smokeless tobacco: Never Used    Alcohol use Yes        MEDICATIONS:    ketorolac (TORADOL) injection 15 mg Q6H PRN   pantoprazole (PROTONIX) 80 mg in sodium chloride 0.9 % 100 mL infusion Continuous   acetaminophen (TYLENOL) suppository 650 mg Q4H PRN   sucralfate (CARAFATE) tablet 1 g 4 times per day   lactated ringers infusion Continuous   sodium chloride flush 0.9 % injection 10 mL 2 times per day   sodium chloride flush 0.9 % injection 10 mL PRN   potassium chloride (KLOR-CON M) extended release tablet 40 mEq PRN   Or    potassium chloride 20 MEQ/15ML (10%) oral solution 40 mEq PRN   Or    potassium chloride 10 mEq/100 mL IVPB (Peripheral Line) PRN   morphine injection 2 mg Q2H PRN   metoprolol (LOPRESSOR) injection 5 mg Q6H PRN       CURRENT INPATIENT MEDICATIONS:  Prior to Admission medications    Medication Sig Start Date End Date Taking? Authorizing Provider   atorvastatin (LIPITOR) 10 MG tablet Take 1 tablet by mouth daily 12/13/17  Yes Wellington Razo MD   calcium carbonate (OSCAL) 500 MG TABS tablet Take 1,200 mg by mouth daily   Yes Historical Provider, MD   Multiple Vitamins-Minerals (THERAPEUTIC MULTIVITAMIN-MINERALS) tablet Take 1 tablet by mouth daily   Yes Historical Provider, MD   aspirin 81 MG EC tablet Take 1 tablet by mouth daily 8/16/17  Yes Debbie Mckee MD   PARoxetine (PAXIL) 20 MG tablet Take 20 mg by mouth every morning   Yes Historical Provider, MD   trospium (SANCTURA) 60 MG CP24 extended release capsule Take 60 mg by mouth daily   Yes Historical Provider, MD       FAMILY HISTORY: family history is not on file.      PHYSICAL EXAM:   BP (!) 168/66   Pulse 64   Temp 98.1 °F (36.7 °C) (Temporal)   Resp 12   Ht 5' 6\" (1.676 m)   Wt 152 lb 3.2 oz (69 kg)   SpO2 97%   BMI 24.57

## 2018-07-21 NOTE — PROGRESS NOTES
NG hooked back up to low intermittent wall suck. AP running between 54-62. Justin aware. Cardizem changed to PRN. He would like Vasotec given PRN for SBP > 40.

## 2018-07-21 NOTE — BRIEF OP NOTE
Brief Postoperative Note  ______________________________________________________________    Patient: Bernarda Dickey  YOB: 1950  MRN: [id number]  Date of Procedure: 7/21/2018    Pre-Op Diagnosis:      1.  GI bleed    Post-Op Diagnosis:      1. Erosive gastritis       Procedure(s):     1. EGD     2. Antral biopsies    Anesthesia: General    Surgeon(s):  Bhakti Fountain MD    Staff:  Scrub Person First: Álvaro Yao LPN     Estimated Blood Loss:  Less than 87PK    Complications: None    Specimens:   ID Type Source Tests Collected by Time Destination   A : ANTRAL BIOPSIES Tissue Esophagus SURGICAL PATHOLOGY Bhakti Fountain MD 7/21/2018 1124      Findings:   As above.     Dictated # [id number]    Bhakti Fountain MD  Date: 7/21/2018  Time: 11:29 AM

## 2018-07-21 NOTE — ANESTHESIA PRE PROCEDURE
Department of Anesthesiology  Preprocedure Note       Name:  Bernarda Dickey   Age:  74 y.o.  :  1950                                          MRN:  [id number]         Date:  2018      Surgeon: Nima Serna):  Bhakti Fountain MD    Procedure: Procedure(s):  EGD ESOPHAGOGASTRODUODENOSCOPY    Medications prior to admission:   Prior to Admission medications    Medication Sig Start Date End Date Taking?  Authorizing Provider   atorvastatin (LIPITOR) 10 MG tablet Take 1 tablet by mouth daily 17  Yes Sue Daily MD   calcium carbonate (OSCAL) 500 MG TABS tablet Take 1,200 mg by mouth daily   Yes Historical Provider, MD   Multiple Vitamins-Minerals (THERAPEUTIC MULTIVITAMIN-MINERALS) tablet Take 1 tablet by mouth daily   Yes Historical Provider, MD   aspirin 81 MG EC tablet Take 1 tablet by mouth daily 17  Yes Hill Coleman MD   PARoxetine (PAXIL) 20 MG tablet Take 20 mg by mouth every morning   Yes Historical Provider, MD   trospium (SANCTURA) 60 MG CP24 extended release capsule Take 60 mg by mouth daily   Yes Historical Provider, MD       Current medications:    Current Facility-Administered Medications   Medication Dose Route Frequency Provider Last Rate Last Dose    [MAR Hold] enalaprilat (VASOTEC) injection 1.25 mg  1.25 mg Intravenous Q6H PRN Gifty Hickman MD   1.25 mg at 18 0750    [MAR Hold] diltiazem injection 10 mg  10 mg Intravenous Q6H PRN Gifty Hickman MD        Fany.Negus Hold] ketorolac (TORADOL) injection 15 mg  15 mg Intravenous Q6H PRN Radha Real PA-C   15 mg at 18 1402    [MAR Hold] pantoprazole (PROTONIX) 80 mg in sodium chloride 0.9 % 100 mL infusion  8 mg/hr Intravenous Continuous Radha Real PA-C 10 mL/hr at 18 0353 8 mg/hr at 18 0353    [MAR Hold] acetaminophen (TYLENOL) suppository 650 mg  650 mg Rectal Q4H PRN Radha Real PA-C        [MAR Hold] sucralfate (CARAFATE) tablet 1 g  1 g Oral 4 times per day Bhakti Fountain MD POCK, POCCL, POCBUN, POCHEMO, POCHCT in the last 72 hours. Coags: No results found for: PROTIME, INR, APTT    HCG (If Applicable): No results found for: PREGTESTUR, PREGSERUM, HCG, HCGQUANT     ABGs: No results found for: PHART, PO2ART, ZOV3RXH, VJN2XDZ, BEART, P4KNYBLU     Type & Screen (If Applicable):  No results found for: LABABO, LABRH    Anesthesia Evaluation   no history of anesthetic complications:   Airway: Mallampati: II  TM distance: >3 FB   Neck ROM: full  Mouth opening: > = 3 FB Dental:          Pulmonary:Negative Pulmonary ROS and normal exam  breath sounds clear to auscultation                             Cardiovascular:  Exercise tolerance: good (>4 METS),   (+) hypertension: mild, dysrhythmias: SVT,                   Neuro/Psych:   Negative Neuro/Psych ROS              GI/Hepatic/Renal: Neg GI/Hepatic/Renal ROS           ROS comment: NGT in place to suction with no output. Endo/Other: Negative Endo/Other ROS                    Abdominal:           Vascular: negative vascular ROS. Anesthesia Plan      general     ASA 2       Induction: intravenous. Anesthetic plan and risks discussed with patient.                       YASMANY Sr - CRNA   7/21/2018

## 2018-07-21 NOTE — ANESTHESIA POSTPROCEDURE EVALUATION
Department of Anesthesiology  Postprocedure Note    Patient: Kevin Rosenthal  MRN: [id number]  YOB: 1950  Date of evaluation: 7/21/2018  Time:  11:59 AM     Procedure Summary     Date:  07/21/18 Room / Location:  [address] [place] [address]    Anesthesia Start:  1114 Anesthesia Stop:      Procedure:  EGD BIOPSY (N/A ) Diagnosis:  (ABD PAIN, BLEEDING)    Surgeon:  Maryann Arias MD Responsible Provider:  YASMANY Moss CRNA    Anesthesia Type:  general ASA Status:  2          Anesthesia Type: general    Jerardo Phase I:      Jerardo Phase II:      Last vitals: Reviewed and per EMR flowsheets.        Anesthesia Post Evaluation    Patient location during evaluation: PACU  Patient participation: complete - patient participated  Level of consciousness: sleepy but conscious  Pain score: 0  Airway patency: patent  Nausea & Vomiting: no nausea and no vomiting  Complications: no  Cardiovascular status: blood pressure returned to baseline  Respiratory status: acceptable  Hydration status: euvolemic

## 2018-07-21 NOTE — PROGRESS NOTES
Progress Note    SUBJECTIVE: Patient is seen for Principal Problem:    Acute abdominal pain  Active Problems:    PSVT (paroxysmal supraventricular tachycardia) (HCC)    Nicotine abuse    Mild malnutrition (HCC)    Acute upper GI bleeding - possible    Hypertension, uncontrolled  Resolved Problems:    * No resolved hospital problems. *     pain better  Hb  11.8  Decreased ng out put    OBJECTIVE:    Vitals:   Vitals:    07/21/18 0600   BP: (!) 176/60   Pulse: 62   Resp: 17   Temp:    SpO2: 96%     Weight: 155 lb 4.8 oz (70.4 kg)   Height: 5' 6\" (167.6 cm)   -----------------------------------------------------------------  Exam:    CONSTITUTIONAL:  awake, alert, cooperative, no apparent distress, and appears stated age  EYES:  Lids and lashes normal, pupils equal, round and reactive to light, extra ocular muscles intact, sclera clear, conjunctiva normal  ENT:  Normocephalic, without obvious abnormality, atraumatic, sinuses nontender on palpation, external ears without lesions, oral pharynx with moist mucus membranes, tonsils without erythema or exudates, gums normal and good dentition. NECK:  Supple, symmetrical, trachea midline, no adenopathy, thyroid symmetric, not enlarged and no tenderness, skin normal  HEMATOLOGIC/LYMPHATICS:  no cervical lymphadenopathy  LUNGS:  No increased work of breathing, good air exchange, clear to auscultation bilaterally, no crackles or wheezing  CARDIOVASCULAR:  Normal apical impulse, regular rate and rhythm, normal S1 and S2, no S3 or S4, and no murmur noted  ABDOMEN:  No scars, normal bowel sounds, soft, non-distended, non-tender, no masses palpated, no hepatosplenomegally    MUSCULOSKELETAL:  there is no redness, warmth, or swelling of the joints  NEUROLOGIC:  Awake, alert, oriented to name, place and time. Cranial nerves II-XII are grossly intact. Motor is 5 out of 5 bilaterally. Cerebellar finger to nose, heel to shin intact. Sensory is intact.   Babinski down going, Romberg Sodium 138 135 - 144 mmol/L    Potassium 4.2 3.7 - 5.3 mmol/L    Chloride 96 (L) 98 - 107 mmol/L    CO2 25 20 - 31 mmol/L    Anion Gap 17 9 - 17 mmol/L    Alkaline Phosphatase 73 35 - 104 U/L    ALT 8 5 - 33 U/L    AST 15 <32 U/L    Total Bilirubin 0.44 0.3 - 1.2 mg/dL    Total Protein 9.1 (H) 6.4 - 8.3 g/dL    Alb 3.8 3.5 - 5.2 g/dL    Albumin/Globulin Ratio 0.7 (L) 1.0 - 2.5    GFR Non-African American >60 >60 mL/min    GFR African American >60 >60 mL/min    GFR Comment          GFR Staging         Lipase   Result Value Ref Range    Lipase 18 13 - 60 U/L   Urinalysis with Microscopic   Result Value Ref Range    Color, UA YELLOW YEL    Turbidity UA SLIGHTLY CLOUDY (A) CLEAR    Glucose, Ur NEGATIVE NEG    Bilirubin Urine NEGATIVE NEG    Ketones, Urine NEGATIVE NEG    Specific [place], UA 1.010 1.010 - 1.020    Urine Hgb NEGATIVE NEG    pH, UA 8.5 5.0 - 9.0    Protein, UA NEGATIVE NEG    Urobilinogen, Urine Normal NORM    Nitrite, Urine NEGATIVE NEG    Leukocyte Esterase, Urine NEGATIVE NEG    Urinalysis Comments NOT REPORTED     -          WBC, UA 0 TO 3 0 - 5 /HPF    RBC, UA 0 TO 2 0 - 2 /HPF    Casts UA NOT REPORTED /LPF    Crystals UA NOT REPORTED NONE /HPF    Epithelial Cells UA 10 TO 20 0 - 25 /HPF    Renal Epithelial, Urine 0 TO 2 0 /HPF    Bacteria, UA 1+ (A) NONE    Mucus, UA NOT REPORTED NONE    Trichomonas, UA NOT REPORTED NONE    Amorphous, UA 1+ (A) NONE    Other Observations UA NOT REPORTED NREQ    Yeast, UA NOT REPORTED NONE   Lactic acid, plasma   Result Value Ref Range    Lactic Acid 0.9 0.5 - 2.2 mmol/L    Lactic Acid, Whole Blood NOT REPORTED 0.7 - 2.1 mmol/L   Troponin   Result Value Ref Range    Troponin T <0.03 <0.03 ng/mL    Troponin Interp         CBC auto differential   Result Value Ref Range    WBC 4.9 3.5 - 11.3 k/uL    RBC 3.49 (L) 3.95 - 5.11 m/uL    Hemoglobin 11.7 (L) 11.9 - 15.1 g/dL    Hematocrit 35.7 (L) 36.3 - 47.1 %    .3 82.6 - 102.9 fL    MCH 33.5 25.2 - 33.5 pg    MCHC Lead   Result Value Ref Range    Ventricular Rate 134 BPM    Atrial Rate 59 BPM    QRS Duration 134 ms    Q-T Interval 326 ms    QTc Calculation (Bazett) 486 ms    R Axis 70 degrees    T Axis 36 degrees   EKG 12 Lead   Result Value Ref Range    Ventricular Rate 81 BPM    Atrial Rate 81 BPM    P-R Interval 192 ms    QRS Duration 102 ms    Q-T Interval 404 ms    QTc Calculation (Bazett) 469 ms    P Axis 64 degrees    R Axis 12 degrees    T Axis 38 degrees   EKG 12 lead   Result Value Ref Range    Ventricular Rate 60 BPM    Atrial Rate 60 BPM    P-R Interval 208 ms    QRS Duration 102 ms    Q-T Interval 438 ms    QTc Calculation (Bazett) 438 ms    P Axis 63 degrees    R Axis 16 degrees    T Axis 33 degrees       ASSESSMENT:   Patient Active Problem List    Diagnosis Date Noted    Hypertension, uncontrolled      Priority: High    Mild malnutrition (HCC) 07/20/2018    Acute upper GI bleeding - possible 07/20/2018    Acute abdominal pain 07/19/2018    PSVT (paroxysmal supraventricular tachycardia) (HCC) 08/15/2017    Angina at rest [facility]) 08/15/2017    Nicotine abuse 08/15/2017         PLAN:  · Await egd  · Rate control  · Monitor hb  · Continue ppi and Liban Montaño M.D.

## 2018-07-22 VITALS
RESPIRATION RATE: 16 BRPM | HEIGHT: 66 IN | BODY MASS INDEX: 25.49 KG/M2 | TEMPERATURE: 98.2 F | WEIGHT: 158.6 LBS | OXYGEN SATURATION: 92 % | SYSTOLIC BLOOD PRESSURE: 138 MMHG | DIASTOLIC BLOOD PRESSURE: 82 MMHG | HEART RATE: 62 BPM

## 2018-07-22 LAB
ABSOLUTE EOS #: 0.07 K/UL (ref 0–0.44)
ABSOLUTE IMMATURE GRANULOCYTE: <0.03 K/UL (ref 0–0.3)
ABSOLUTE LYMPH #: 2.01 K/UL (ref 1.1–3.7)
ABSOLUTE MONO #: 0.37 K/UL (ref 0.1–1.2)
ALBUMIN SERPL-MCNC: 2.8 G/DL (ref 3.5–5.2)
ALBUMIN/GLOBULIN RATIO: 0.7 (ref 1–2.5)
ALP BLD-CCNC: 49 U/L (ref 35–104)
ALT SERPL-CCNC: <5 U/L (ref 5–33)
ANION GAP SERPL CALCULATED.3IONS-SCNC: 9 MMOL/L (ref 9–17)
AST SERPL-CCNC: 10 U/L
BASOPHILS # BLD: 0 % (ref 0–2)
BASOPHILS ABSOLUTE: <0.03 K/UL (ref 0–0.2)
BILIRUB SERPL-MCNC: 0.47 MG/DL (ref 0.3–1.2)
BUN BLDV-MCNC: 7 MG/DL (ref 8–23)
BUN/CREAT BLD: 13 (ref 9–20)
CALCIUM SERPL-MCNC: 8.8 MG/DL (ref 8.6–10.4)
CHLORIDE BLD-SCNC: 103 MMOL/L (ref 98–107)
CO2: 31 MMOL/L (ref 20–31)
CREAT SERPL-MCNC: 0.52 MG/DL (ref 0.5–0.9)
DIFFERENTIAL TYPE: ABNORMAL
EOSINOPHILS RELATIVE PERCENT: 2 % (ref 1–4)
GFR AFRICAN AMERICAN: >60 ML/MIN
GFR NON-AFRICAN AMERICAN: >60 ML/MIN
GFR SERPL CREATININE-BSD FRML MDRD: ABNORMAL ML/MIN/{1.73_M2}
GFR SERPL CREATININE-BSD FRML MDRD: ABNORMAL ML/MIN/{1.73_M2}
GLUCOSE BLD-MCNC: 96 MG/DL (ref 70–99)
HCT VFR BLD CALC: 32.3 % (ref 36.3–47.1)
HEMOGLOBIN: 10.5 G/DL (ref 11.9–15.1)
IMMATURE GRANULOCYTES: 0 %
LYMPHOCYTES # BLD: 47 % (ref 24–43)
MCH RBC QN AUTO: 33.4 PG (ref 25.2–33.5)
MCHC RBC AUTO-ENTMCNC: 32.5 G/DL (ref 28.4–34.8)
MCV RBC AUTO: 102.9 FL (ref 82.6–102.9)
MONOCYTES # BLD: 9 % (ref 3–12)
NRBC AUTOMATED: 0 PER 100 WBC
PDW BLD-RTO: 11.8 % (ref 11.8–14.4)
PLATELET # BLD: 138 K/UL (ref 138–453)
PLATELET ESTIMATE: ABNORMAL
PMV BLD AUTO: 9.7 FL (ref 8.1–13.5)
POTASSIUM SERPL-SCNC: 3.9 MMOL/L (ref 3.7–5.3)
RBC # BLD: 3.14 M/UL (ref 3.95–5.11)
RBC # BLD: ABNORMAL 10*6/UL
SEG NEUTROPHILS: 43 % (ref 36–65)
SEGMENTED NEUTROPHILS ABSOLUTE COUNT: 1.85 K/UL (ref 1.5–8.1)
SODIUM BLD-SCNC: 143 MMOL/L (ref 135–144)
TOTAL PROTEIN: 6.7 G/DL (ref 6.4–8.3)
WBC # BLD: 4.3 K/UL (ref 3.5–11.3)
WBC # BLD: ABNORMAL 10*3/UL

## 2018-07-22 PROCEDURE — 2580000003 HC RX 258: Performed by: FAMILY MEDICINE

## 2018-07-22 PROCEDURE — 6370000000 HC RX 637 (ALT 250 FOR IP): Performed by: SURGERY

## 2018-07-22 PROCEDURE — 36415 COLL VENOUS BLD VENIPUNCTURE: CPT

## 2018-07-22 PROCEDURE — 80053 COMPREHEN METABOLIC PANEL: CPT

## 2018-07-22 PROCEDURE — C9113 INJ PANTOPRAZOLE SODIUM, VIA: HCPCS | Performed by: PHYSICIAN ASSISTANT

## 2018-07-22 PROCEDURE — 85025 COMPLETE CBC W/AUTO DIFF WBC: CPT

## 2018-07-22 PROCEDURE — 6360000002 HC RX W HCPCS: Performed by: PHYSICIAN ASSISTANT

## 2018-07-22 PROCEDURE — 2580000003 HC RX 258: Performed by: PHYSICIAN ASSISTANT

## 2018-07-22 PROCEDURE — 2500000003 HC RX 250 WO HCPCS: Performed by: FAMILY MEDICINE

## 2018-07-22 RX ORDER — OMEPRAZOLE 40 MG/1
40 CAPSULE, DELAYED RELEASE ORAL DAILY
Qty: 30 CAPSULE | Refills: 0 | Status: SHIPPED | OUTPATIENT
Start: 2018-07-22 | End: 2018-10-22 | Stop reason: ALTCHOICE

## 2018-07-22 RX ORDER — SUCRALFATE 1 G/1
1 TABLET ORAL 4 TIMES DAILY
Qty: 120 TABLET | Refills: 3 | Status: SHIPPED | OUTPATIENT
Start: 2018-07-22 | End: 2020-03-16

## 2018-07-22 RX ORDER — NIFEDIPINE 90 MG/1
90 TABLET, EXTENDED RELEASE ORAL DAILY
Qty: 30 TABLET | Refills: 0 | Status: SHIPPED | OUTPATIENT
Start: 2018-07-22 | End: 2018-10-22 | Stop reason: ALTCHOICE

## 2018-07-22 RX ADMIN — SUCRALFATE 1 G: 1 TABLET ORAL at 05:49

## 2018-07-22 RX ADMIN — SODIUM CHLORIDE 8 MG/HR: 9 INJECTION, SOLUTION INTRAVENOUS at 04:42

## 2018-07-22 RX ADMIN — METOPROLOL TARTRATE 5 MG: 5 INJECTION, SOLUTION INTRAVENOUS at 00:16

## 2018-07-22 RX ADMIN — SODIUM CHLORIDE, POTASSIUM CHLORIDE, SODIUM LACTATE AND CALCIUM CHLORIDE: 600; 310; 30; 20 INJECTION, SOLUTION INTRAVENOUS at 05:49

## 2018-07-22 RX ADMIN — METOPROLOL TARTRATE 5 MG: 5 INJECTION, SOLUTION INTRAVENOUS at 07:52

## 2018-07-22 NOTE — PROGRESS NOTES
Platelets 949 058 - 920 k/uL    MPV 9.3 8.1 - 13.5 fL    NRBC Automated 0.0 0.0 per 100 WBC    Differential Type NOT REPORTED     WBC Morphology NOT REPORTED     RBC Morphology NOT REPORTED     Platelet Estimate NOT REPORTED     Seg Neutrophils 47 36 - 65 %    Lymphocytes 43 24 - 43 %    Monocytes 8 3 - 12 %    Eosinophils % 2 1 - 4 %    Basophils 0 0 - 2 %    Immature Granulocytes 0 0 %    Segs Absolute 2.29 1.50 - 8.10 k/uL    Absolute Lymph # 2.09 1.10 - 3.70 k/uL    Absolute Mono # 0.37 0.10 - 1.20 k/uL    Absolute Eos # 0.10 0.00 - 0.44 k/uL    Basophils # <0.03 0.00 - 0.20 k/uL    Absolute Immature Granulocyte <0.03 0.00 - 0.30 k/uL   Troponin   Result Value Ref Range    Troponin T <0.03 <0.03 ng/mL    Troponin Interp         Lactic acid, plasma   Result Value Ref Range    Lactic Acid 0.8 0.5 - 2.2 mmol/L    Lactic Acid, Whole Blood NOT REPORTED 0.7 - 2.1 mmol/L   Occult blood gastric / duodenum   Result Value Ref Range    Specimen Description . GASTRIC     Special Requests NOT REPORTED     Direct Exam POSITIVE (A)     Status FINAL 07/20/2018    Comprehensive Metabolic Panel   Result Value Ref Range    Glucose 106 (H) 70 - 99 mg/dL    BUN 14 8 - 23 mg/dL    CREATININE 0.69 0.50 - 0.90 mg/dL    Bun/Cre Ratio 20 9 - 20    Calcium 9.4 8.6 - 10.4 mg/dL    Sodium 138 135 - 144 mmol/L    Potassium 4.1 3.7 - 5.3 mmol/L    Chloride 96 (L) 98 - 107 mmol/L    CO2 30 20 - 31 mmol/L    Anion Gap 12 9 - 17 mmol/L    Alkaline Phosphatase 65 35 - 104 U/L    ALT 7 5 - 33 U/L    AST 13 <32 U/L    Total Bilirubin 0.42 0.3 - 1.2 mg/dL    Total Protein 8.0 6.4 - 8.3 g/dL    Alb 3.4 (L) 3.5 - 5.2 g/dL    Albumin/Globulin Ratio 0.7 (L) 1.0 - 2.5    GFR Non-African American >60 >60 mL/min    GFR African American >60 >60 mL/min    GFR Comment          GFR Staging         Hemoglobin and Hematocrit, blood   Result Value Ref Range    Hemoglobin 11.8 (L) 11.9 - 15.1 g/dL    Hematocrit 36.3 36.3 - 47.1 %   CBC auto differential   Result Value Ref Range    WBC 6.0 3.5 - 11.3 k/uL    RBC 3.50 (L) 3.95 - 5.11 m/uL    Hemoglobin 11.8 (L) 11.9 - 15.1 g/dL    Hematocrit 36.5 36.3 - 47.1 %    .3 (H) 82.6 - 102.9 fL    MCH 33.7 (H) 25.2 - 33.5 pg    MCHC 32.3 28.4 - 34.8 g/dL    RDW 11.9 11.8 - 14.4 %    Platelets 177 (L) 180 - 453 k/uL    MPV 9.7 8.1 - 13.5 fL    NRBC Automated 0.0 0.0 per 100 WBC    Differential Type NOT REPORTED     Seg Neutrophils 64 36 - 65 %    Lymphocytes 26 24 - 43 %    Monocytes 8 3 - 12 %    Eosinophils % 2 1 - 4 %    Basophils 0 0 - 2 %    Immature Granulocytes 0 0 %    Segs Absolute 3.88 1.50 - 8.10 k/uL    Absolute Lymph # 1.57 1.10 - 3.70 k/uL    Absolute Mono # 0.45 0.10 - 1.20 k/uL    Absolute Eos # 0.10 0.00 - 0.44 k/uL    Basophils # <0.03 0.00 - 0.20 k/uL    Absolute Immature Granulocyte <0.03 0.00 - 0.30 k/uL    WBC Morphology NOT REPORTED     RBC Morphology NOT REPORTED     Platelet Estimate NOT REPORTED    Comprehensive Metabolic Panel   Result Value Ref Range    Glucose 86 70 - 99 mg/dL    BUN 14 8 - 23 mg/dL    CREATININE 0.60 0.50 - 0.90 mg/dL    Bun/Cre Ratio 23 (H) 9 - 20    Calcium 9.1 8.6 - 10.4 mg/dL    Sodium 140 135 - 144 mmol/L    Potassium 4.0 3.7 - 5.3 mmol/L    Chloride 98 98 - 107 mmol/L    CO2 30 20 - 31 mmol/L    Anion Gap 12 9 - 17 mmol/L    Alkaline Phosphatase 58 35 - 104 U/L    ALT 6 5 - 33 U/L    AST 12 <32 U/L    Total Bilirubin 0.64 0.3 - 1.2 mg/dL    Total Protein 7.7 6.4 - 8.3 g/dL    Alb 3.1 (L) 3.5 - 5.2 g/dL    Albumin/Globulin Ratio 0.7 (L) 1.0 - 2.5    GFR Non-African American >60 >60 mL/min    GFR African American >60 >60 mL/min    GFR Comment          GFR Staging         CBC auto differential   Result Value Ref Range    WBC 4.3 3.5 - 11.3 k/uL    RBC 3.14 (L) 3.95 - 5.11 m/uL    Hemoglobin 10.5 (L) 11.9 - 15.1 g/dL    Hematocrit 32.3 (L) 36.3 - 47.1 %    .9 82.6 - 102.9 fL    MCH 33.4 25.2 - 33.5 pg    MCHC 32.5 28.4 - 34.8 g/dL    RDW 11.8 11.8 - 14.4 %    Platelets 429 138 - 453 k/uL    MPV 9.7 8.1 - 13.5 fL    NRBC Automated 0.0 0.0 per 100 WBC    Differential Type NOT REPORTED     WBC Morphology NOT REPORTED     RBC Morphology NOT REPORTED     Platelet Estimate NOT REPORTED     Seg Neutrophils 43 36 - 65 %    Lymphocytes 47 (H) 24 - 43 %    Monocytes 9 3 - 12 %    Eosinophils % 2 1 - 4 %    Basophils 0 0 - 2 %    Immature Granulocytes 0 0 %    Segs Absolute 1.85 1.50 - 8.10 k/uL    Absolute Lymph # 2.01 1.10 - 3.70 k/uL    Absolute Mono # 0.37 0.10 - 1.20 k/uL    Absolute Eos # 0.07 0.00 - 0.44 k/uL    Basophils # <0.03 0.00 - 0.20 k/uL    Absolute Immature Granulocyte <0.03 0.00 - 0.30 k/uL   Comprehensive Metabolic Panel   Result Value Ref Range    Glucose 96 70 - 99 mg/dL    BUN 7 (L) 8 - 23 mg/dL    CREATININE 0.52 0.50 - 0.90 mg/dL    Bun/Cre Ratio 13 9 - 20    Calcium 8.8 8.6 - 10.4 mg/dL    Sodium 143 135 - 144 mmol/L    Potassium 3.9 3.7 - 5.3 mmol/L    Chloride 103 98 - 107 mmol/L    CO2 31 20 - 31 mmol/L    Anion Gap 9 9 - 17 mmol/L    Alkaline Phosphatase 49 35 - 104 U/L    ALT <5 (L) 5 - 33 U/L    AST 10 <32 U/L    Total Bilirubin 0.47 0.3 - 1.2 mg/dL    Total Protein 6.7 6.4 - 8.3 g/dL    Alb 2.8 (L) 3.5 - 5.2 g/dL    Albumin/Globulin Ratio 0.7 (L) 1.0 - 2.5    GFR Non-African American >60 >60 mL/min    GFR African American >60 >60 mL/min    GFR Comment          GFR Staging         EKG 12 Lead   Result Value Ref Range    Ventricular Rate 50 BPM    Atrial Rate 50 BPM    P-R Interval 188 ms    QRS Duration 94 ms    Q-T Interval 454 ms    QTc Calculation (Bazett) 413 ms    P Axis 31 degrees    R Axis 0 degrees    T Axis 34 degrees   EKG 12 Lead   Result Value Ref Range    Ventricular Rate 134 BPM    Atrial Rate 59 BPM    QRS Duration 134 ms    Q-T Interval 326 ms    QTc Calculation (Bazett) 486 ms    R Axis 70 degrees    T Axis 36 degrees   EKG 12 Lead   Result Value Ref Range    Ventricular Rate 81 BPM    Atrial Rate 81 BPM    P-R Interval 192 ms    QRS Duration 102 ms    Q-T Interval 404 ms    QTc Calculation (Bazett) 469 ms    P Axis 64 degrees    R Axis 12 degrees    T Axis 38 degrees   EKG 12 lead   Result Value Ref Range    Ventricular Rate 60 BPM    Atrial Rate 60 BPM    P-R Interval 208 ms    QRS Duration 102 ms    Q-T Interval 438 ms    QTc Calculation (Bazett) 438 ms    P Axis 63 degrees    R Axis 16 degrees    T Axis 33 degrees       ASSESSMENT:   Patient Active Problem List    Diagnosis Date Noted    Hypertension, uncontrolled      Priority: High    Erosive gastritis 07/21/2018    Mild malnutrition (HCC) 07/20/2018    Acute upper GI bleeding - possible 07/20/2018    Acute abdominal pain 07/19/2018    PSVT (paroxysmal supraventricular tachycardia) ([facility] [address] 08/15/2017    Angina at rest ([facility] 08/15/2017    Nicotine abuse 08/15/2017         PLAN:  · D/c home today onoral ppi and carafate  · F/u with pcp  And Dr Rozina Rich M.D.

## 2018-07-22 NOTE — PROGRESS NOTES
Pt had told writer that her pharmacy is [facility] in [facility], not [place] prior to d/c. Writer spoke with Marbin at [facility] in [place] and requested transfer of scripts to [facility]. Writer then called [facility] in [facility] and notified them of the need for scripts to be filled.

## 2018-07-22 NOTE — DISCHARGE SUMMARY
Description . URINE     Special Requests CCU     Culture NO SIGNIFICANT GROWTH     Status FINAL 07/20/2018    Urinalysis with Microscopic    Collection Time: 07/19/18 12:53 PM   Result Value Ref Range    Color, UA YELLOW YEL    Turbidity UA SLIGHTLY CLOUDY (A) CLEAR    Glucose, Ur NEGATIVE NEG    Bilirubin Urine NEGATIVE NEG    Ketones, Urine NEGATIVE NEG    Specific [place], UA 1.010 1.010 - 1.020    Urine Hgb NEGATIVE NEG    pH, UA 8.5 5.0 - 9.0    Protein, UA NEGATIVE NEG    Urobilinogen, Urine Normal NORM    Nitrite, Urine NEGATIVE NEG    Leukocyte Esterase, Urine NEGATIVE NEG    Urinalysis Comments NOT REPORTED     -          WBC, UA 0 TO 3 0 - 5 /HPF    RBC, UA 0 TO 2 0 - 2 /HPF    Casts UA NOT REPORTED /LPF    Crystals UA NOT REPORTED NONE /HPF    Epithelial Cells UA 10 TO 20 0 - 25 /HPF    Renal Epithelial, Urine 0 TO 2 0 /HPF    Bacteria, UA 1+ (A) NONE    Mucus, UA NOT REPORTED NONE    Trichomonas, UA NOT REPORTED NONE    Amorphous, UA 1+ (A) NONE    Other Observations UA NOT REPORTED NREQ    Yeast, UA NOT REPORTED NONE   CBC Auto Differential    Collection Time: 07/19/18 12:54 PM   Result Value Ref Range    WBC 5.0 3.5 - 11.3 k/uL    RBC 3.85 (L) 3.95 - 5.11 m/uL    Hemoglobin 13.1 11.9 - 15.1 g/dL    Hematocrit 38.0 36.3 - 47.1 %    MCV 98.7 82.6 - 102.9 fL    MCH 34.0 (H) 25.2 - 33.5 pg    MCHC 34.5 28.4 - 34.8 g/dL    RDW 11.9 11.8 - 14.4 %    Platelets 726 892 - 790 k/uL    MPV 9.3 8.1 - 13.5 fL    NRBC Automated 0.0 0.0 per 100 WBC    Differential Type NOT REPORTED     Seg Neutrophils 47 36 - 65 %    Lymphocytes 45 (H) 24 - 43 %    Monocytes 7 3 - 12 %    Eosinophils % 1 1 - 4 %    Basophils 0 0 - 2 %    Immature Granulocytes 0 0 %    Segs Absolute 2.35 1.50 - 8.10 k/uL    Absolute Lymph # 2.21 1.10 - 3.70 k/uL    Absolute Mono # 0.33 0.10 - 1.20 k/uL    Absolute Eos # 0.06 0.00 - 0.44 k/uL    Basophils # <0.03 0.00 - 0.20 k/uL    Absolute Immature Granulocyte <0.03 0.00 - 0.30 k/uL    WBC Morphology NOT REPORTED     RBC Morphology NOT REPORTED     Platelet Estimate NOT REPORTED    Comprehensive Metabolic Panel    Collection Time: 07/19/18 12:54 PM   Result Value Ref Range    Glucose 101 (H) 70 - 99 mg/dL    BUN 17 8 - 23 mg/dL    CREATININE 0.66 0.50 - 0.90 mg/dL    Bun/Cre Ratio 26 (H) 9 - 20    Calcium 10.0 8.6 - 10.4 mg/dL    Sodium 138 135 - 144 mmol/L    Potassium 4.2 3.7 - 5.3 mmol/L    Chloride 96 (L) 98 - 107 mmol/L    CO2 25 20 - 31 mmol/L    Anion Gap 17 9 - 17 mmol/L    Alkaline Phosphatase 73 35 - 104 U/L    ALT 8 5 - 33 U/L    AST 15 <32 U/L    Total Bilirubin 0.44 0.3 - 1.2 mg/dL    Total Protein 9.1 (H) 6.4 - 8.3 g/dL    Alb 3.8 3.5 - 5.2 g/dL    Albumin/Globulin Ratio 0.7 (L) 1.0 - 2.5    GFR Non-African American >60 >60 mL/min    GFR African American >60 >60 mL/min    GFR Comment          GFR Staging         Lipase    Collection Time: 07/19/18 12:54 PM   Result Value Ref Range    Lipase 18 13 - 60 U/L   EKG 12 Lead    Collection Time: 07/19/18  1:28 PM   Result Value Ref Range    Ventricular Rate 50 BPM    Atrial Rate 50 BPM    P-R Interval 188 ms    QRS Duration 94 ms    Q-T Interval 454 ms    QTc Calculation (Bazett) 413 ms    P Axis 31 degrees    R Axis 0 degrees    T Axis 34 degrees   Lactic acid, plasma    Collection Time: 07/19/18  5:17 PM   Result Value Ref Range    Lactic Acid 0.9 0.5 - 2.2 mmol/L    Lactic Acid, Whole Blood NOT REPORTED 0.7 - 2.1 mmol/L   Troponin    Collection Time: 07/19/18  5:17 PM   Result Value Ref Range    Troponin T <0.03 <0.03 ng/mL    Troponin Interp         EKG 12 Lead    Collection Time: 07/19/18  6:43 PM   Result Value Ref Range    Ventricular Rate 134 BPM    Atrial Rate 59 BPM    QRS Duration 134 ms    Q-T Interval 326 ms    QTc Calculation (Bazett) 486 ms    R Axis 70 degrees    T Axis 36 degrees   EKG 12 Lead    Collection Time: 07/19/18  7:11 PM   Result Value Ref Range    Ventricular Rate 81 BPM    Atrial Rate 81 BPM    P-R Interval 192 ms    QRS

## 2018-07-22 NOTE — PROGRESS NOTES
Pt discharged off unit ambulatory to private auto driven by .  carrying d/c folder and personal belongings.

## 2018-07-23 NOTE — OP NOTE
[address] [address]                                 OPERATIVE REPORT    PATIENT NAME: Wang Camp                    :        1950  MED REC NO:   [id number]                              ROOM:       0310  ACCOUNT NO:   [de-identified]                           ADMIT DATE: 2018  PROVIDER:     Cristina Singer    DATE OF PROCEDURE:  2018    ATTENDING SURGEON:  Dr. Cristina Singer. PRIMARY CARE PROVIDER:  Dr. Christoph Washburn. MEDICAL ATTENDING:  Dr. Julio Rowley. PREOPERATIVE DIAGNOSIS:  GI bleed. POSTOPERATIVE DIAGNOSIS:  Erosive gastritis. OPERATIONS PERFORMED:  1. Esophagogastroduodenoscopy. 2.  Prepyloric antral biopsies. ANESTHESIA:  MAC.    INDICATIONS:  The patient is a 57-year-old white female admitted with  abdominal pain and upper GI bleed. She has been hemodynamically stable. At this time, diagnostic endoscopy is indicated. OPERATIVE PROCEDURE:  After obtaining informed consent with discussion of  risks, benefits, and alternatives including a remote risk of GI perforation  and missed lesions, the patient was taken to the Endoscopy Suite and placed  in the left lateral recumbent position. Following adequate IV sedation, an  endoscope was passed over the tongue into the posterior oropharynx. Vocal  folds were visualized and appeared normal.  The scope was directed into the  esophagus and onto the GE junction. An NG tube was in appropriate  position. Upper, mid, and lower esophagus all appeared normal.  GE  junction was also normal.  There were no rings. No webs. No mass lesions. No varices. The stomach was entered, had normal distensibility. On  retroflexion, the fundus and cardia had areas of spotty erythema and  diffuse erosive gastritis. These were less in the lower body and  prepyloric antrum. Prepyloric antral biopsies were obtained. There were  no ulcers. Evidence of recent GI bleed was evident, but there was no  active bleeding. Pylorus was patent, and the duodenum was entered first,  second, and third portions of which appeared normal.  The stomach was  decompressed by suction as the scope was removed. The patient tolerated  the procedure well and was transferred to PACU in stable condition. SPECIMENS:  Prepyloric antral biopsies. DRAINS:  None. COMPLICATIONS:  None. DISPOSITION:  To PACU awake, alert, and stable. Following recovery, we  will return the patient to the Surgical Floor with gradual advancement of  diet and activity as tolerated. Continuation of proton pump inhibition. Followup will be with me in two weeks as an outpatient and with Dr. Janice Claire at her next regular appointment.         BRAYAN Kemp    D: 07/21/2018 11:34:15       T: 07/21/2018 12:10:39     [id number]  Job#: [id number]     Doc#: [id number]    CC:  Diogo Fabian

## 2018-07-24 LAB
CULTURE: NORMAL
CULTURE: NORMAL
Lab: NORMAL
Lab: NORMAL
SPECIMEN DESCRIPTION: NORMAL
SPECIMEN DESCRIPTION: NORMAL
STATUS: NORMAL
STATUS: NORMAL
SURGICAL PATHOLOGY REPORT: NORMAL

## 2018-07-25 ENCOUNTER — OFFICE VISIT (OUTPATIENT)
Dept: SURGERY | Age: 68
End: 2018-07-25
Payer: MEDICARE

## 2018-07-25 VITALS
SYSTOLIC BLOOD PRESSURE: 169 MMHG | HEIGHT: 66 IN | HEART RATE: 61 BPM | BODY MASS INDEX: 24.75 KG/M2 | TEMPERATURE: 97 F | WEIGHT: 154 LBS | DIASTOLIC BLOOD PRESSURE: 78 MMHG

## 2018-07-25 DIAGNOSIS — Z98.890 S/P ENDOSCOPY: Primary | ICD-10-CM

## 2018-07-25 DIAGNOSIS — K29.31 CHRONIC SUPERFICIAL GASTRITIS WITH BLEEDING: ICD-10-CM

## 2018-07-25 PROCEDURE — 1123F ACP DISCUSS/DSCN MKR DOCD: CPT | Performed by: SURGERY

## 2018-07-25 PROCEDURE — 3017F COLORECTAL CA SCREEN DOC REV: CPT | Performed by: SURGERY

## 2018-07-25 PROCEDURE — 1111F DSCHRG MED/CURRENT MED MERGE: CPT | Performed by: SURGERY

## 2018-07-25 PROCEDURE — 1090F PRES/ABSN URINE INCON ASSESS: CPT | Performed by: SURGERY

## 2018-07-25 PROCEDURE — 99213 OFFICE O/P EST LOW 20 MIN: CPT | Performed by: SURGERY

## 2018-07-25 PROCEDURE — G8420 CALC BMI NORM PARAMETERS: HCPCS | Performed by: SURGERY

## 2018-07-25 PROCEDURE — G8598 ASA/ANTIPLAT THER USED: HCPCS | Performed by: SURGERY

## 2018-07-25 PROCEDURE — 1036F TOBACCO NON-USER: CPT | Performed by: SURGERY

## 2018-07-25 PROCEDURE — G8427 DOCREV CUR MEDS BY ELIG CLIN: HCPCS | Performed by: SURGERY

## 2018-07-25 PROCEDURE — G8400 PT W/DXA NO RESULTS DOC: HCPCS | Performed by: SURGERY

## 2018-07-25 PROCEDURE — 4040F PNEUMOC VAC/ADMIN/RCVD: CPT | Performed by: SURGERY

## 2018-07-25 PROCEDURE — 1101F PT FALLS ASSESS-DOCD LE1/YR: CPT | Performed by: SURGERY

## 2018-07-25 RX ORDER — OXYBUTYNIN CHLORIDE 5 MG/1
5 TABLET ORAL 3 TIMES DAILY
COMMUNITY
Start: 2018-04-21

## 2018-07-26 ASSESSMENT — ENCOUNTER SYMPTOMS
VOMITING: 1
SHORTNESS OF BREATH: 0
BLOOD IN STOOL: 0
ABDOMINAL PAIN: 1
BACK PAIN: 1
NAUSEA: 1
COUGH: 0
DIARRHEA: 1

## 2018-07-26 NOTE — ED PROVIDER NOTES
Ami Betancourt [facility] MED SURG  eMERGENCY dEPARTMENT eNCOUnter      Pt Name: Danelle Holder  MRN: [id number]  [place] 1950  Date of evaluation: 7/19/2018  Provider: Luther Myers PA-C,    CHIEF COMPLAINT       Chief Complaint   Patient presents with    Abdominal Pain     pt c/o sudden onset of mid epigastric pain that goes through to her back    Emesis     onset with pain at [address]     Danelle Holder is a 74 y.o. female who presents to the emergency department from home Patient is complaining of diffuse abdominal pain which is sharp tearing in nature and radiates through to her back. He states that the pain is most intense and epigastric area. Patient states associated symptoms nausea and vomiting with diarrhea ×1. Patient denies any fever or chills, numbness or paresthesias, flank pain, urinary symptoms, chest pain or shortness of breath, melena, hematochezia or hematemesis syncope or any other complaints at present. Triage notes and Nursing notes were reviewed by myself. Any discrepancies are addressed above. PAST MEDICAL HISTORY     Past Medical History:   Diagnosis Date    Abnormal patient-activated cardiac event monitor 09/06/2017    sinus tachycardia with PSVT lasting 36 sec @ 165 bpm.    H/O echocardiogram 08/15/2017    EF 60%. Mildly increased LV wall thickness normal LV cavity size. No definite specific wall motion abnormalities were identified. No significant valvular abnormalities Mild diastolic dysfunction is seen.     Hypertension        SURGICAL HISTORY       Past Surgical History:   Procedure Laterality Date    CARDIAC CATHETERIZATION Left 08/16/2017    left radial/[facility]/Dr Garcia    HYSTERECTOMY  1996    UPPER GASTROINTESTINAL ENDOSCOPY N/A 7/21/2018    EGD BIOPSY performed by Concha Denise [facility] at [facility]       Discharge Medication List as of 7/22/2018 11:49 AM      CONTINUE these medications which have NOT CHANGED    Details   atorvastatin (LIPITOR) 10 MG tablet Take 1 tablet by mouth daily, Disp-90 tablet, R-3Normal      calcium carbonate (OSCAL) 500 MG TABS tablet Take 1,200 mg by mouth dailyHistorical Med      Multiple Vitamins-Minerals (THERAPEUTIC MULTIVITAMIN-MINERALS) tablet Take 1 tablet by mouth dailyHistorical Med      PARoxetine (PAXIL) 20 MG tablet Take 20 mg by mouth every morningHistorical Med      trospium (SANCTURA) 60 MG CP24 extended release capsule Take 60 mg by mouth dailyHistorical Med             ALLERGIES     Coricidin hbp cold-flu [chlorpheniramine-acetaminophen]    FAMILY HISTORY     History reviewed. No pertinent family history. SOCIAL HISTORY       Social History     Social History    Marital status:      Spouse name: N/A    Number of children: N/A    Years of education: N/A     Social History Main Topics    Smoking status: Former Smoker     Packs/day: 1.00     Types: Cigarettes     Quit date: 4/20/2018    Smokeless tobacco: Never Used    Alcohol use Yes      Comment: quit  7/18/18    Drug use: No    Sexual activity: Not Asked     Other Topics Concern    None     Social History Narrative    None       REVIEW OF SYSTEMS       Review of Systems   Constitutional: Negative for chills and fever. Respiratory: Negative for cough and shortness of breath. Cardiovascular: Negative for chest pain and palpitations. Gastrointestinal: Positive for abdominal pain, diarrhea, nausea and vomiting. Negative for blood in stool. Genitourinary: Negative for difficulty urinating and hematuria. Musculoskeletal: Positive for back pain. Skin: Negative for rash. Neurological: Negative for dizziness, syncope and numbness. Hematological: Negative. All other systems reviewed and are negative.     Review of systems as in HPI & PMH otherwise negative    PHYSICAL EXAM    (up to 7 for level 4, 8 or more for level 5)     ED Triage Vitals   BP Temp Temp Source Pulse Resp SpO2 Height Weight   07/19/18 1244 07/19/18 1244 07/19/18 1244 07/19/18 1244 07/19/18 1244 07/19/18 1244 07/19/18 2206 07/19/18 1244   (!) 178/79 98.9 °F (37.2 °C) Tympanic 60 18 99 % 5' 6\" (1.676 m) 150 lb (68 kg)       Physical Exam   Constitutional: She is oriented to person, place, and time. She appears well-developed and well-nourished. HENT:   Head: Normocephalic and atraumatic. Mouth/Throat: Oropharynx is clear and moist.   Eyes: Conjunctivae are normal. No scleral icterus. Neck: Normal range of motion. Neck supple. Cardiovascular: Regular rhythm. Bradycardia   Pulmonary/Chest: Effort normal and breath sounds normal.   Abdominal: Soft. There is tenderness. There is guarding. Musculoskeletal: Normal range of motion. Neurological: She is alert and oriented to person, place, and time. No cranial nerve deficit. Coordination normal.   Skin: Skin is warm and dry. Psychiatric: She has a normal mood and affect. Her behavior is normal. Judgment and thought content normal.   Nursing note and vitals reviewed.       DIAGNOSTIC RESULTS     EKG: (none if blank)  All EKG's are interpreted by the Emergency Department Physician who either signs or Co-signs this chart in the absence of a cardiologist.    Sinus bradycardia    RADIOLOGY: (none if blank)   Interpretation per the Radiologist below, if available at the time of this note:    CTA CHEST W CONTRAST   Final Result   Impression:     Slight pectus excavatum      Intact thoracic aorta without evidence of dissection or aneurysm      12 mm fluid density lesion inferior to the right hilum may be a bronchogenic cyst      No evidence of acute fracture or vertebral compression      Linear scar versus atelectasis in both lung bases, more on the right      No CT evidence of PE         Electronically Signed By: Mary Campbellgm   on  07/19/2018  16:33      CTA ABDOMEN PELVIS W WO CONTRAST   Final Result   Impression:     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 THIS MAY BE DUE TO A SMALL PLEURAL EFFUSION AND OR FOCAL ATELECTATIC CHANGE OR INTERVAL SCARRING. THE LUNGS ARE OTHERWISE CLEAR.           LABS:  Labs Reviewed   CBC WITH AUTO DIFFERENTIAL - Abnormal; Notable for the following:        Result Value    RBC 3.85 (*)     MCH 34.0 (*)     Lymphocytes 45 (*)     All other components within normal limits   COMPREHENSIVE METABOLIC PANEL - Abnormal; Notable for the following:     Glucose 101 (*)     Bun/Cre Ratio 26 (*)     Chloride 96 (*)     Total Protein 9.1 (*)     Albumin/Globulin Ratio 0.7 (*)     All other components within normal limits   URINALYSIS WITH MICROSCOPIC - Abnormal; Notable for the following:     Turbidity UA SLIGHTLY CLOUDY (*)     Bacteria, UA 1+ (*)     Amorphous, UA 1+ (*)     All other components within normal limits   CBC WITH AUTO DIFFERENTIAL - Abnormal; Notable for the following:     RBC 3.49 (*)     Hemoglobin 11.7 (*)     Hematocrit 35.7 (*)     All other components within normal limits   OCCULT BLOOD GASTRIC / DUODENUM - Abnormal; Notable for the following:     Direct Exam POSITIVE (*)     All other components within normal limits   COMPREHENSIVE METABOLIC PANEL - Abnormal; Notable for the following:     Glucose 106 (*)     Chloride 96 (*)     Alb 3.4 (*) MINGO  07/26/18 1111       Lorenzo Brady PA-C  08/02/18 1052

## 2018-07-29 ASSESSMENT — ENCOUNTER SYMPTOMS
BACK PAIN: 0
COUGH: 0
SHORTNESS OF BREATH: 0
TROUBLE SWALLOWING: 0
CHOKING: 0
SORE THROAT: 0
VOMITING: 0
BLOOD IN STOOL: 0
ABDOMINAL PAIN: 0
NAUSEA: 0

## 2018-07-30 NOTE — PROGRESS NOTES
Subjective:      Patient ID: Chrissy Garcia is a 74 y.o. female. HPI     Ms Heriberto Sweeney returns for follow up after recent hospital admission for upper GI bleed. She was found to have erosive gastritis on EGD July 19, 2018. She is doing well. No abdominal pain. Tolerating diet without difficulty. She quit tobacco April 2018, and has not resumed smoking. She is taking PPI and carafate. Review of Systems   Constitutional: Negative for activity change, appetite change, chills, fever and unexpected weight change. HENT: Negative for nosebleeds, sneezing, sore throat and trouble swallowing. Eyes: Negative for visual disturbance. Respiratory: Negative for cough, choking and shortness of breath. Cardiovascular: Negative for chest pain, palpitations and leg swelling. Gastrointestinal: Negative for abdominal pain, blood in stool, nausea and vomiting. Genitourinary: Negative for dysuria, flank pain and hematuria. Musculoskeletal: Positive for arthralgias. Negative for back pain, gait problem and myalgias. Allergic/Immunologic: Negative for immunocompromised state. Neurological: Positive for headaches. Negative for dizziness, seizures, syncope and weakness. Hematological: Does not bruise/bleed easily. Psychiatric/Behavioral: Negative for confusion and sleep disturbance. Past Medical History:   Diagnosis Date    Abnormal patient-activated cardiac event monitor 09/06/2017    sinus tachycardia with PSVT lasting 36 sec @ 165 bpm.    H/O echocardiogram 08/15/2017    EF 60%. Mildly increased LV wall thickness normal LV cavity size. No definite specific wall motion abnormalities were identified. No significant valvular abnormalities Mild diastolic dysfunction is seen.     Hypertension        Past Surgical History:   Procedure Laterality Date    CARDIAC CATHETERIZATION Left 08/16/2017    left radial/[facility]/Dr Garcia    HYSTERECTOMY  1996    UPPER GASTROINTESTINAL ENDOSCOPY N/A 7/21/2018    EGD BIOPSY performed by Cherrie Alvarenga MD at [address] reviewed. No pertinent family history. Allergies:  See list    Current Outpatient Prescriptions   Medication Sig Dispense Refill    oxybutynin (DITROPAN) 5 MG tablet Take 5 mg by mouth 3 times daily      sucralfate (CARAFATE) 1 GM tablet Take 1 tablet by mouth 4 times daily 120 tablet 3    omeprazole (PRILOSEC) 40 MG delayed release capsule Take 1 capsule by mouth daily 30 capsule 0    NIFEdipine (PROCARDIA XL) 90 MG extended release tablet Take 1 tablet by mouth daily 30 tablet 0    atorvastatin (LIPITOR) 10 MG tablet Take 1 tablet by mouth daily 90 tablet 3    calcium carbonate (OSCAL) 500 MG TABS tablet Take 1,200 mg by mouth daily      Multiple Vitamins-Minerals (THERAPEUTIC MULTIVITAMIN-MINERALS) tablet Take 1 tablet by mouth daily      PARoxetine (PAXIL) 20 MG tablet Take 20 mg by mouth every morning       No current facility-administered medications for this visit. Social History     Social History    Marital status:      Spouse name: N/A    Number of children: N/A    Years of education: N/A     Social History Main Topics    Smoking status: Former Smoker     Packs/day: 1.00     Types: Cigarettes     Quit date: 4/20/2018    Smokeless tobacco: Never Used    Alcohol use Yes      Comment: quit  7/18/18    Drug use: No    Sexual activity: Not Asked     Other Topics Concern    None     Social History Narrative    None       Objective:   Physical Exam   Constitutional: She is oriented to person, place, and time. She appears well-developed and well-nourished. No distress. HENT:   Head: Normocephalic and atraumatic. Eyes: Conjunctivae are normal. Pupils are equal, round, and reactive to light. No scleral icterus. Neck: No tracheal deviation present. Cardiovascular: Normal rate. Pulmonary/Chest: Effort normal. No respiratory distress. Musculoskeletal: She exhibits no edema.    Neurological: She is alert and oriented to person, place, and time. Skin: Skin is warm and dry. Psychiatric: She has a normal mood and affect. Her behavior is normal. Judgment and thought content normal.   Nursing note and vitals reviewed. 7/24/2018 11:02 AM - Kitty Hernandez Incoming Lab Results From [facility]     Component Results     Component Collected Lab   Surgical Pathology Report 07/21/2018  9:32  Doni Maloney   (NOTE)   [id number]   [address],  [address]. [place], [address]   [phone]   Fax: [phone]     0 [facility]     Patient Name: Michael Abler: Juve   Path Number: [id number]   Collected: 7/21/2018   Received: 7/23/2018   Reported: 7/24/2018 11:01     -- Diagnosis --   STOMACH, ANTRUM, BIOPSIES:   - MILD CHRONIC GASTRITIS.   - FOCAL INTESTINAL METAPLASIA. - NEGATIVE FOR HELICOBACTER PYLORI, DYSPLASIA AND MALIGNANCY. Toma Wilkins. Reji Yang,   **Electronically Signed Out**         sls/7/24/2018       Clinical Information   Pre-op Diagnosis:  ABD PAIN, BLEEDING   Operative Findings:  ANTRAL BIOPSIES   Operation Performed:  EGD, ESOPHAGOGASTRODUODENOSCOPY     Source of Specimen   1: ANTRAL BIOPSIES     Gross Description   \"LUCIANNE BOWLUS, ANTRAL BIOPSIES\" Four tan tissue fragments from 0.5   to 0.8 cm, measuring in aggregate 2.2 x 0.3 x 0.2 cm.  Entirely in   1cs.   rh/as       Microscopic Description   Microscopic examination performed. Assessment:       Diagnosis Orders   1. S/P endoscopy     2. Chronic superficial gastritis with bleeding           Plan:      Endoscopic findings and pathology reviewed with Ms Mihcael Gallegos. Continue PPI, bland diet. Encouraged ongoing abstinence from tobacco.  Recommend EGD when she has next screening colonoscopy.

## 2018-07-30 NOTE — COMMUNICATION BODY
Assessment:      Diagnosis Orders   1. S/P endoscopy     2. Chronic superficial gastritis with bleeding           Plan:     Endoscopic findings and pathology reviewed with Ms Nestor Can. Continue PPI, bland diet. Encouraged ongoing abstinence from tobacco.  Recommend EGD when she has next screening colonoscopy.

## 2018-07-31 ENCOUNTER — OFFICE VISIT (OUTPATIENT)
Dept: VASCULAR SURGERY | Age: 68
End: 2018-07-31
Payer: MEDICARE

## 2018-07-31 VITALS
DIASTOLIC BLOOD PRESSURE: 64 MMHG | RESPIRATION RATE: 18 BRPM | SYSTOLIC BLOOD PRESSURE: 132 MMHG | BODY MASS INDEX: 24.77 KG/M2 | HEART RATE: 79 BPM | HEIGHT: 66 IN | OXYGEN SATURATION: 97 % | WEIGHT: 154.1 LBS

## 2018-07-31 DIAGNOSIS — I77.1 CELIAC ARTERY STENOSIS (HCC): Primary | ICD-10-CM

## 2018-07-31 PROCEDURE — G8420 CALC BMI NORM PARAMETERS: HCPCS | Performed by: SURGERY

## 2018-07-31 PROCEDURE — 1101F PT FALLS ASSESS-DOCD LE1/YR: CPT | Performed by: SURGERY

## 2018-07-31 PROCEDURE — 4040F PNEUMOC VAC/ADMIN/RCVD: CPT | Performed by: SURGERY

## 2018-07-31 PROCEDURE — 99202 OFFICE O/P NEW SF 15 MIN: CPT | Performed by: SURGERY

## 2018-07-31 PROCEDURE — 1123F ACP DISCUSS/DSCN MKR DOCD: CPT | Performed by: SURGERY

## 2018-07-31 PROCEDURE — 3017F COLORECTAL CA SCREEN DOC REV: CPT | Performed by: SURGERY

## 2018-07-31 PROCEDURE — G8427 DOCREV CUR MEDS BY ELIG CLIN: HCPCS | Performed by: SURGERY

## 2018-07-31 PROCEDURE — 1111F DSCHRG MED/CURRENT MED MERGE: CPT | Performed by: SURGERY

## 2018-07-31 PROCEDURE — 1090F PRES/ABSN URINE INCON ASSESS: CPT | Performed by: SURGERY

## 2018-07-31 PROCEDURE — G8400 PT W/DXA NO RESULTS DOC: HCPCS | Performed by: SURGERY

## 2018-07-31 PROCEDURE — 1036F TOBACCO NON-USER: CPT | Performed by: SURGERY

## 2018-07-31 PROCEDURE — G8598 ASA/ANTIPLAT THER USED: HCPCS | Performed by: SURGERY

## 2018-07-31 NOTE — PROGRESS NOTES
delayed release capsule Take 1 capsule by mouth daily 7/22/18  Yes Yue Conley MD   NIFEdipine (PROCARDIA XL) 90 MG extended release tablet Take 1 tablet by mouth daily 7/22/18  Yes Yue Conley MD   atorvastatin (LIPITOR) 10 MG tablet Take 1 tablet by mouth daily 12/13/17  Yes Edilson Carolina MD   calcium carbonate (OSCAL) 500 MG TABS tablet Take 1,200 mg by mouth daily   Yes Historical Provider, MD   Multiple Vitamins-Minerals (THERAPEUTIC MULTIVITAMIN-MINERALS) tablet Take 1 tablet by mouth daily   Yes Historical Provider, MD   PARoxetine (PAXIL) 20 MG tablet Take 20 mg by mouth every morning   Yes Historical Provider, MD        Allergies:       Coricidin hbp cold-flu [chlorpheniramine-acetaminophen]    Social History:     Tobacco:    reports that she quit smoking about 3 months ago. Her smoking use included Cigarettes. She smoked 1.00 pack per day. She has never used smokeless tobacco.  Alcohol:      reports that she drinks alcohol. Drug Use:  reports that she does not use drugs. Family History:     History reviewed. No pertinent family history.     Review of Systems:     Positive and Negative as described in HPI      Constitutional:  negative for  fevers, chills, sweats, fatigue, and weight loss  HEENT:  negative for vision or hearing changes,   Respiratory:  negative for shortness of breath, cough, or congestion  Cardiovascular:  negative for  chest pain, palpitations  Gastrointestinal:  negative for nausea, vomiting, diarrhea, constipation, abdominal pain  Genitourinary:  negative for frequency, dysuria  Integument:  negative for rash, skin lesions  Chest/Breast:  No painful inspiration or expiration, no rib sternal pain  Musculoskeletal:  negative for muscle aches or joint pain  Neurological:  negative for headaches, dizziness, lightheadedness, numbness, pain and tingling extremities  Lymphatics: no lymphadenopathy or painful masses  Behavior/Psych:  negative for depression and anxiety      Physical Exam:     Vitals:  /64 (Site: Right Arm, Position: Sitting, Cuff Size: Medium Adult)   Pulse 79   Resp 18   Ht 5' 5.98\" (1.676 m)   Wt 154 lb 1.6 oz (69.9 kg)   SpO2 97%   BMI 24.88 kg/m²       General appearance - alert, well appearing and in no acute distress  Mental status - oriented to person, place and time with normal affect  Head - normocephalic and atraumatic  Eyes - pupils equal and reactive, extraocular eye movements intact, conjunctiva clear  Ears - hearing appears to be intact  Nose - no drainage noted  Mouth - mucous membranes moist  Neck - supple, no carotid bruits, thyroid not palpable, no JVD  Chest - clear to auscultation, normal effort  Heart - normal rate, regular rhythm, no murmurs  Abdomen - soft, non-tender, non-distended, bowel sounds present all four quadrants, no masses   Neurological - normal speech, no focal findings or movement disorder noted, cranial nerves II through XII grossly intact  Extremities - normal strength tone and sensation of the bilateral upper and lower extremities  Skin - no gross lesions, rashes, or induration noted  Foot Exam - grossly normal      Vascular Exam:  R brachial 2+ L brachial 2+   R radial 2+ L radial 2+   R femoral 2+ L femoral 2+   R popliteal 2+ L popliteal 2+   R posterior tibial 2+ L posterior tibial 2+   R dorsalis pedis 2+ L dorsalis pedis 2+         Imaging:   CT angiogram is been reviewed with the patient demonstrating celiac artery stenosis and in the MIP images we have demonstrated the connection between the superior mesenteric artery and the celiac artery.   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

## 2018-10-22 ENCOUNTER — HOSPITAL ENCOUNTER (OUTPATIENT)
Age: 68
Discharge: HOME | End: 2018-10-22
Payer: MEDICARE

## 2018-10-22 ENCOUNTER — OFFICE VISIT (OUTPATIENT)
Dept: ONCOLOGY | Age: 68
End: 2018-10-22
Payer: MEDICARE

## 2018-10-22 VITALS
HEIGHT: 66 IN | WEIGHT: 150.4 LBS | HEART RATE: 67 BPM | TEMPERATURE: 97.7 F | DIASTOLIC BLOOD PRESSURE: 70 MMHG | SYSTOLIC BLOOD PRESSURE: 167 MMHG | BODY MASS INDEX: 24.17 KG/M2

## 2018-10-22 DIAGNOSIS — D64.9 ANEMIA, UNSPECIFIED TYPE: ICD-10-CM

## 2018-10-22 DIAGNOSIS — D75.9 BONE MARROW DISORDER: ICD-10-CM

## 2018-10-22 LAB
ABSOLUTE EOS #: 0.06 K/UL (ref 0–0.44)
ABSOLUTE IMMATURE GRANULOCYTE: <0.03 K/UL (ref 0–0.3)
ABSOLUTE LYMPH #: 2.2 K/UL (ref 1.1–3.7)
ABSOLUTE MONO #: 0.43 K/UL (ref 0.1–1.2)
ALBUMIN SERPL-MCNC: 4.1 G/DL (ref 3.5–5.2)
ALBUMIN/GLOBULIN RATIO: 0.8 (ref 1–2.5)
ALP BLD-CCNC: 79 U/L (ref 35–104)
ALT SERPL-CCNC: 11 U/L (ref 5–33)
ANION GAP SERPL CALCULATED.3IONS-SCNC: 14 MMOL/L (ref 9–17)
AST SERPL-CCNC: 17 U/L
BASOPHILS # BLD: 0 % (ref 0–2)
BASOPHILS ABSOLUTE: <0.03 K/UL (ref 0–0.2)
BILIRUB SERPL-MCNC: 0.36 MG/DL (ref 0.3–1.2)
BUN BLDV-MCNC: 14 MG/DL (ref 8–23)
BUN/CREAT BLD: 24 (ref 9–20)
CALCIUM SERPL-MCNC: 9.8 MG/DL (ref 8.6–10.4)
CHLORIDE BLD-SCNC: 98 MMOL/L (ref 98–107)
CO2: 28 MMOL/L (ref 20–31)
CREAT SERPL-MCNC: 0.58 MG/DL (ref 0.5–0.9)
DIFFERENTIAL TYPE: ABNORMAL
EOSINOPHILS RELATIVE PERCENT: 1 % (ref 1–4)
FERRITIN: 60 UG/L (ref 13–150)
FOLATE: >20 NG/ML
GFR AFRICAN AMERICAN: >60 ML/MIN
GFR NON-AFRICAN AMERICAN: >60 ML/MIN
GFR SERPL CREATININE-BSD FRML MDRD: ABNORMAL ML/MIN/{1.73_M2}
GFR SERPL CREATININE-BSD FRML MDRD: ABNORMAL ML/MIN/{1.73_M2}
GLUCOSE BLD-MCNC: 90 MG/DL (ref 70–99)
HCT VFR BLD CALC: 38.4 % (ref 36.3–47.1)
HEMOGLOBIN: 12.1 G/DL (ref 11.9–15.1)
IMMATURE GRANULOCYTES: 0 %
IRON SATURATION: 33 % (ref 20–55)
IRON: 80 UG/DL (ref 37–145)
LYMPHOCYTES # BLD: 35 % (ref 24–43)
MCH RBC QN AUTO: 33.3 PG (ref 25.2–33.5)
MCHC RBC AUTO-ENTMCNC: 31.5 G/DL (ref 28.4–34.8)
MCV RBC AUTO: 105.8 FL (ref 82.6–102.9)
MONOCYTES # BLD: 7 % (ref 3–12)
NRBC AUTOMATED: 0 PER 100 WBC
PDW BLD-RTO: 12.4 % (ref 11.8–14.4)
PLATELET # BLD: 224 K/UL (ref 138–453)
PLATELET ESTIMATE: ABNORMAL
PMV BLD AUTO: 9.7 FL (ref 8.1–13.5)
POTASSIUM SERPL-SCNC: 4.7 MMOL/L (ref 3.7–5.3)
RBC # BLD: 3.63 M/UL (ref 3.95–5.11)
RBC # BLD: ABNORMAL 10*6/UL
SEG NEUTROPHILS: 57 % (ref 36–65)
SEGMENTED NEUTROPHILS ABSOLUTE COUNT: 3.65 K/UL (ref 1.5–8.1)
SODIUM BLD-SCNC: 140 MMOL/L (ref 135–144)
TOTAL IRON BINDING CAPACITY: 243 UG/DL (ref 250–450)
TOTAL PROTEIN: 9.3 G/DL (ref 6.4–8.3)
UNSATURATED IRON BINDING CAPACITY: 162.6 UG/DL (ref 112–347)
VITAMIN B-12: 450 PG/ML (ref 232–1245)
WBC # BLD: 6.4 K/UL (ref 3.5–11.3)
WBC # BLD: ABNORMAL 10*3/UL

## 2018-10-22 PROCEDURE — 85025 COMPLETE CBC W/AUTO DIFF WBC: CPT

## 2018-10-22 PROCEDURE — G8484 FLU IMMUNIZE NO ADMIN: HCPCS | Performed by: INTERNAL MEDICINE

## 2018-10-22 PROCEDURE — 1101F PT FALLS ASSESS-DOCD LE1/YR: CPT | Performed by: INTERNAL MEDICINE

## 2018-10-22 PROCEDURE — 1036F TOBACCO NON-USER: CPT | Performed by: INTERNAL MEDICINE

## 2018-10-22 PROCEDURE — 83540 ASSAY OF IRON: CPT

## 2018-10-22 PROCEDURE — 80053 COMPREHEN METABOLIC PANEL: CPT

## 2018-10-22 PROCEDURE — 83550 IRON BINDING TEST: CPT

## 2018-10-22 PROCEDURE — 82728 ASSAY OF FERRITIN: CPT

## 2018-10-22 PROCEDURE — G8400 PT W/DXA NO RESULTS DOC: HCPCS | Performed by: INTERNAL MEDICINE

## 2018-10-22 PROCEDURE — G8598 ASA/ANTIPLAT THER USED: HCPCS | Performed by: INTERNAL MEDICINE

## 2018-10-22 PROCEDURE — 36415 COLL VENOUS BLD VENIPUNCTURE: CPT

## 2018-10-22 PROCEDURE — G8420 CALC BMI NORM PARAMETERS: HCPCS | Performed by: INTERNAL MEDICINE

## 2018-10-22 PROCEDURE — 1123F ACP DISCUSS/DSCN MKR DOCD: CPT | Performed by: INTERNAL MEDICINE

## 2018-10-22 PROCEDURE — 99204 OFFICE O/P NEW MOD 45 MIN: CPT | Performed by: INTERNAL MEDICINE

## 2018-10-22 PROCEDURE — 82746 ASSAY OF FOLIC ACID SERUM: CPT

## 2018-10-22 PROCEDURE — 84238 ASSAY NONENDOCRINE RECEPTOR: CPT

## 2018-10-22 PROCEDURE — 4040F PNEUMOC VAC/ADMIN/RCVD: CPT | Performed by: INTERNAL MEDICINE

## 2018-10-22 PROCEDURE — 82607 VITAMIN B-12: CPT

## 2018-10-22 PROCEDURE — 1090F PRES/ABSN URINE INCON ASSESS: CPT | Performed by: INTERNAL MEDICINE

## 2018-10-22 PROCEDURE — G8427 DOCREV CUR MEDS BY ELIG CLIN: HCPCS | Performed by: INTERNAL MEDICINE

## 2018-10-22 PROCEDURE — 3017F COLORECTAL CA SCREEN DOC REV: CPT | Performed by: INTERNAL MEDICINE

## 2018-10-22 RX ORDER — MULTIVIT-MIN/IRON/FOLIC ACID/K 18-600-40
CAPSULE ORAL DAILY
COMMUNITY

## 2018-10-22 ASSESSMENT — ENCOUNTER SYMPTOMS
BACK PAIN: 1
NAUSEA: 0
DIARRHEA: 0
EYE ITCHING: 0
SHORTNESS OF BREATH: 0
WHEEZING: 0
BLOOD IN STOOL: 0
EYE REDNESS: 0
CHEST TIGHTNESS: 0
COUGH: 0
ABDOMINAL PAIN: 0
VOMITING: 0
COLOR CHANGE: 0
CONSTIPATION: 0

## 2018-10-22 NOTE — LETTER
10/22/2018     Alan Bhat MD   [address]    Dear Dr. Alan Bhat MD, and Dr. Karron Boast ref. provider found: Thank you for referring Melissa Flynn, 1950, to me for evaluation. Below are the relevant portions of my assessment and plan of care. [facility] PHYSICIANS  [facility] ONCOLOGY SPECIALISTS  [facility] [place]  [phone]  Dept: [phone]  Dept Fax: [phone]  Melissa Flynn is a 74 y.o. female who presentstoday for follow up of her   Chief Complaint   Patient presents with    Cancer     patient presents today as new patient for bone cancer. Referral from Dr. Ami Hansen         HPI Melissa Flynn is a 51-year-old lady with history of low back pain and bilateral hip pain. She has had x-rays of her lumbar spine and that was consistent with lumbar scoliosis with degenerative changes. Bilateral hip degenerative joint disease. She then had an MRI of the lumbosacral spine was found to have marked heterogeneity of the bone marrow suspicious for metastatic disease. Other changes showed disc displacement since the L5-S1 area and broad based protrusion at L3 4 along with facet arthropathy. She was referred to me for evaluation of the abnormal bone marrow finding. Patient has had blood work done in July when her hemoglobin was 10.5. She otherwise denies any other symptoms including fevers or night sweats or weight loss. She does not have any other bone pain except for bilateral hip pain which she's told is from bursitis.      Current Outpatient Prescriptions   Medication Sig Dispense Refill    Cholecalciferol (VITAMIN D) 2000 units CAPS capsule Take by mouth      oxybutynin (DITROPAN) 5 MG tablet Take 5 mg by mouth 3 times daily      sucralfate (CARAFATE) 1 GM tablet Take 1 tablet by mouth 4 times daily 120 tablet 3    atorvastatin (LIPITOR) 10 MG tablet Take 1 tablet by mouth daily 90 tablet 3 Respiratory: Negative for cough, chest tightness, shortness of breath and wheezing. Cardiovascular: Negative for chest pain, palpitations and leg swelling. Gastrointestinal: Negative for abdominal pain, blood in stool, constipation, diarrhea, nausea and vomiting. Genitourinary: Negative for decreased urine volume, difficulty urinating, dysuria, flank pain, frequency, hematuria, pelvic pain and urgency. Musculoskeletal: Positive for back pain. Negative for arthralgias, joint swelling and myalgias. Skin: Negative for color change, pallor and rash. Neurological: Negative for dizziness, seizures, syncope, weakness, light-headedness, numbness and headaches. Hematological: Negative for adenopathy. Does not bruise/bleed easily. Psychiatric/Behavioral: Negative for agitation, behavioral problems and confusion. Physical Exam   Constitutional: She is oriented to person, place, and time. She appears well-developed and well-nourished. No distress. HENT:   Head: Normocephalic. Eyes: Pupils are equal, round, and reactive to light. No scleral icterus. Neck: Neck supple. No thyromegaly present. Cardiovascular: Normal rate and regular rhythm. No murmur heard. Pulmonary/Chest: Effort normal and breath sounds normal. No respiratory distress. She has no wheezes. Right breast exhibits no mass. Left breast exhibits no mass. Abdominal: Soft. She exhibits no mass. There is no hepatosplenomegaly. There is no tenderness. Musculoskeletal: Normal range of motion. She exhibits no edema or tenderness. Lymphadenopathy:     She has no cervical adenopathy. She has no axillary adenopathy. Neurological: She is alert and oriented to person, place, and time. No cranial nerve deficit. Skin: Skin is warm and dry. No cyanosis. Nails show no clubbing. Psychiatric: She has a normal mood and affect. Her behavior is normal. Thought content normal.   Nursing note and vitals reviewed. RBC Morphology NOT REPORTED     Platelet Estimate NOT REPORTED     Seg Neutrophils 47 36 - 65 %    Lymphocytes 43 24 - 43 %    Monocytes 8 3 - 12 %    Eosinophils % 2 1 - 4 %    Basophils 0 0 - 2 %    Immature Granulocytes 0 0 %    Segs Absolute 2.29 1.50 - 8.10 k/uL    Absolute Lymph # 2.09 1.10 - 3.70 k/uL    Absolute Mono # 0.37 0.10 - 1.20 k/uL    Absolute Eos # 0.10 0.00 - 0.44 k/uL    Basophils # <0.03 0.00 - 0.20 k/uL    Absolute Immature Granulocyte <0.03 0.00 - 0.30 k/uL   Troponin   Result Value Ref Range    Troponin T <0.03 <0.03 ng/mL    Troponin Interp         Lactic acid, plasma   Result Value Ref Range    Lactic Acid 0.8 0.5 - 2.2 mmol/L    Lactic Acid, Whole Blood NOT REPORTED 0.7 - 2.1 mmol/L   Occult blood gastric / duodenum   Result Value Ref Range    Specimen Description . GASTRIC     Special Requests NOT REPORTED     Direct Exam POSITIVE (A)     Status FINAL 07/20/2018    Comprehensive Metabolic Panel   Result Value Ref Range    Glucose 106 (H) 70 - 99 mg/dL    BUN 14 8 - 23 mg/dL    CREATININE 0.69 0.50 - 0.90 mg/dL    Bun/Cre Ratio 20 9 - 20    Calcium 9.4 8.6 - 10.4 mg/dL    Sodium 138 135 - 144 mmol/L    Potassium 4.1 3.7 - 5.3 mmol/L    Chloride 96 (L) 98 - 107 mmol/L    CO2 30 20 - 31 mmol/L    Anion Gap 12 9 - 17 mmol/L    Alkaline Phosphatase 65 35 - 104 U/L    ALT 7 5 - 33 U/L    AST 13 <32 U/L    Total Bilirubin 0.42 0.3 - 1.2 mg/dL    Total Protein 8.0 6.4 - 8.3 g/dL    Alb 3.4 (L) 3.5 - 5.2 g/dL    Albumin/Globulin Ratio 0.7 (L) 1.0 - 2.5    GFR Non-African American >60 >60 mL/min    GFR African American >60 >60 mL/min    GFR Comment          GFR Staging         Hemoglobin and Hematocrit, blood   Result Value Ref Range    Hemoglobin 11.8 (L) 11.9 - 15.1 g/dL    Hematocrit 36.3 36.3 - 47.1 %   CBC auto differential   Result Value Ref Range    WBC 6.0 3.5 - 11.3 k/uL    RBC 3.50 (L) 3.95 - 5.11 m/uL    Hemoglobin 11.8 (L) 11.9 - 15.1 g/dL    Hematocrit 36.5 36.3 - 47.1 % .3 (H) 82.6 - 102.9 fL    MCH 33.7 (H) 25.2 - 33.5 pg    MCHC 32.3 28.4 - 34.8 g/dL    RDW 11.9 11.8 - 14.4 %    Platelets 633 (L) 920 - 453 k/uL    MPV 9.7 8.1 - 13.5 fL    NRBC Automated 0.0 0.0 per 100 WBC    Differential Type NOT REPORTED     Seg Neutrophils 64 36 - 65 %    Lymphocytes 26 24 - 43 %    Monocytes 8 3 - 12 %    Eosinophils % 2 1 - 4 %    Basophils 0 0 - 2 %    Immature Granulocytes 0 0 %    Segs Absolute 3.88 1.50 - 8.10 k/uL    Absolute Lymph # 1.57 1.10 - 3.70 k/uL    Absolute Mono # 0.45 0.10 - 1.20 k/uL    Absolute Eos # 0.10 0.00 - 0.44 k/uL    Basophils # <0.03 0.00 - 0.20 k/uL    Absolute Immature Granulocyte <0.03 0.00 - 0.30 k/uL    WBC Morphology NOT REPORTED     RBC Morphology NOT REPORTED     Platelet Estimate NOT REPORTED    Comprehensive Metabolic Panel   Result Value Ref Range    Glucose 86 70 - 99 mg/dL    BUN 14 8 - 23 mg/dL    CREATININE 0.60 0.50 - 0.90 mg/dL    Bun/Cre Ratio 23 (H) 9 - 20    Calcium 9.1 8.6 - 10.4 mg/dL    Sodium 140 135 - 144 mmol/L    Potassium 4.0 3.7 - 5.3 mmol/L    Chloride 98 98 - 107 mmol/L    CO2 30 20 - 31 mmol/L    Anion Gap 12 9 - 17 mmol/L    Alkaline Phosphatase 58 35 - 104 U/L    ALT 6 5 - 33 U/L    AST 12 <32 U/L    Total Bilirubin 0.64 0.3 - 1.2 mg/dL    Total Protein 7.7 6.4 - 8.3 g/dL    Alb 3.1 (L) 3.5 - 5.2 g/dL    Albumin/Globulin Ratio 0.7 (L) 1.0 - 2.5    GFR Non-African American >60 >60 mL/min    GFR African American >60 >60 mL/min    GFR Comment          GFR Staging         CBC auto differential   Result Value Ref Range    WBC 4.3 3.5 - 11.3 k/uL    RBC 3.14 (L) 3.95 - 5.11 m/uL    Hemoglobin 10.5 (L) 11.9 - 15.1 g/dL    Hematocrit 32.3 (L) 36.3 - 47.1 %    .9 82.6 - 102.9 fL    MCH 33.4 25.2 - 33.5 pg    MCHC 32.5 28.4 - 34.8 g/dL    RDW 11.8 11.8 - 14.4 %    Platelets 409 055 - 353 k/uL    MPV 9.7 8.1 - 13.5 fL    NRBC Automated 0.0 0.0 per 100 WBC    Differential Type NOT REPORTED     WBC Morphology NOT REPORTED

## 2018-10-22 NOTE — PROGRESS NOTES
Reactions    Coricidin Hbp Cold-Flu [Chlorpheniramine-Acetaminophen]        Past Medical History:   Diagnosis Date    Abnormal patient-activated cardiac event monitor 09/06/2017    sinus tachycardia with PSVT lasting 36 sec @ 165 bpm.    H/O echocardiogram 08/15/2017    EF 60%. Mildly increased LV wall thickness normal LV cavity size. No definite specific wall motion abnormalities were identified. No significant valvular abnormalities Mild diastolic dysfunction is seen.  Hypertension     Primary cancer of bone marrow [facility])         Past Surgical History:   Procedure Laterality Date    CARDIAC CATHETERIZATION Left 08/16/2017    left radial/[facility]/Dr Garcia    HYSTERECTOMY  1996    UPPER GASTROINTESTINAL ENDOSCOPY N/A 7/21/2018    EGD BIOPSY performed by Otilio Shen MD at [place] [address] History   Problem Relation Age of Onset    Lupus Mother     Cancer Father         leukemia    Brain Cancer Sister        Social History   Substance Use Topics    Smoking status: Former Smoker     Packs/day: 1.00     Types: Cigarettes     Quit date: 4/20/2018    Smokeless tobacco: Never Used    Alcohol use Yes      Comment: quit  7/18/18          The Past MedicalHistory, Past Surgical History, Past Family History and Past Social History havebeen reviewed      Review of Systems   Constitutional: Negative for activity change, appetite change, chills, diaphoresis, fatigue, fever and unexpected weight change. HENT: Negative for congestion, hearing loss, mouth sores and nosebleeds. Eyes: Negative for redness, itching and visual disturbance. Respiratory: Negative for cough, chest tightness, shortness of breath and wheezing. Cardiovascular: Negative for chest pain, palpitations and leg swelling. Gastrointestinal: Negative for abdominal pain, blood in stool, constipation, diarrhea, nausea and vomiting.    Genitourinary: Negative for decreased urine volume, difficulty urinating, dysuria, flank pain, frequency, hematuria, pelvic pain and urgency. Musculoskeletal: Positive for back pain. Negative for arthralgias, joint swelling and myalgias. Skin: Negative for color change, pallor and rash. Neurological: Negative for dizziness, seizures, syncope, weakness, light-headedness, numbness and headaches. Hematological: Negative for adenopathy. Does not bruise/bleed easily. Psychiatric/Behavioral: Negative for agitation, behavioral problems and confusion. Physical Exam   Constitutional: She is oriented to person, place, and time. She appears well-developed and well-nourished. No distress. HENT:   Head: Normocephalic. Eyes: Pupils are equal, round, and reactive to light. No scleral icterus. Neck: Neck supple. No thyromegaly present. Cardiovascular: Normal rate and regular rhythm. No murmur heard. Pulmonary/Chest: Effort normal and breath sounds normal. No respiratory distress. She has no wheezes. Right breast exhibits no mass. Left breast exhibits no mass. Abdominal: Soft. She exhibits no mass. There is no hepatosplenomegaly. There is no tenderness. Musculoskeletal: Normal range of motion. She exhibits no edema or tenderness. Lymphadenopathy:     She has no cervical adenopathy. She has no axillary adenopathy. Neurological: She is alert and oriented to person, place, and time. No cranial nerve deficit. Skin: Skin is warm and dry. No cyanosis. Nails show no clubbing. Psychiatric: She has a normal mood and affect. Her behavior is normal. Thought content normal.   Nursing note and vitals reviewed. Results for orders placed or performed during the hospital encounter of 07/19/18   Urine Culture   Result Value Ref Range    Specimen Description . URINE     Special Requests CCU     Culture NO SIGNIFICANT GROWTH     Status FINAL 07/20/2018    CULTURE BLOOD #1   Result Value Ref Range    Specimen Description . BLOOD     Special Requests 20ML RFA     Culture NO GROWTH 5 DAYS     Status FINAL 07/24/2018    CULTURE BLOOD #2   Result Value Ref Range    Specimen Description . BLOOD     Special Requests  20ML LFA     Culture NO GROWTH 5 DAYS     Status FINAL 07/24/2018    CBC Auto Differential   Result Value Ref Range    WBC 5.0 3.5 - 11.3 k/uL    RBC 3.85 (L) 3.95 - 5.11 m/uL    Hemoglobin 13.1 11.9 - 15.1 g/dL    Hematocrit 38.0 36.3 - 47.1 %    MCV 98.7 82.6 - 102.9 fL    MCH 34.0 (H) 25.2 - 33.5 pg    MCHC 34.5 28.4 - 34.8 g/dL    RDW 11.9 11.8 - 14.4 %    Platelets 215 392 - 921 k/uL    MPV 9.3 8.1 - 13.5 fL    NRBC Automated 0.0 0.0 per 100 WBC    Differential Type NOT REPORTED     Seg Neutrophils 47 36 - 65 %    Lymphocytes 45 (H) 24 - 43 %    Monocytes 7 3 - 12 %    Eosinophils % 1 1 - 4 %    Basophils 0 0 - 2 %    Immature Granulocytes 0 0 %    Segs Absolute 2.35 1.50 - 8.10 k/uL    Absolute Lymph # 2.21 1.10 - 3.70 k/uL    Absolute Mono # 0.33 0.10 - 1.20 k/uL    Absolute Eos # 0.06 0.00 - 0.44 k/uL    Basophils # <0.03 0.00 - 0.20 k/uL    Absolute Immature Granulocyte <0.03 0.00 - 0.30 k/uL    WBC Morphology NOT REPORTED     RBC Morphology NOT REPORTED     Platelet Estimate NOT REPORTED    Comprehensive Metabolic Panel   Result Value Ref Range    Glucose 101 (H) 70 - 99 mg/dL    BUN 17 8 - 23 mg/dL    CREATININE 0.66 0.50 - 0.90 mg/dL    Bun/Cre Ratio 26 (H) 9 - 20    Calcium 10.0 8.6 - 10.4 mg/dL    Sodium 138 135 - 144 mmol/L    Potassium 4.2 3.7 - 5.3 mmol/L    Chloride 96 (L) 98 - 107 mmol/L    CO2 25 20 - 31 mmol/L    Anion Gap 17 9 - 17 mmol/L    Alkaline Phosphatase 73 35 - 104 U/L    ALT 8 5 - 33 U/L    AST 15 <32 U/L    Total Bilirubin 0.44 0.3 - 1.2 mg/dL    Total Protein 9.1 (H) 6.4 - 8.3 g/dL    Alb 3.8 3.5 - 5.2 g/dL    Albumin/Globulin Ratio 0.7 (L) 1.0 - 2.5    GFR Non-African American >60 >60 mL/min    GFR African American >60 >60 mL/min    GFR Comment          GFR Staging         Lipase   Result Value Ref Range    Lipase 18 13 - 60 U/L   Urinalysis with Microscopic   Result

## 2018-10-24 LAB — SOLUBLE TRANSFERRIN RECEPT: 1.9 MG/L (ref 1.9–4.4)

## 2018-11-01 ENCOUNTER — OFFICE VISIT (OUTPATIENT)
Dept: ONCOLOGY | Age: 68
End: 2018-11-01
Payer: MEDICARE

## 2018-11-01 VITALS
WEIGHT: 155 LBS | RESPIRATION RATE: 18 BRPM | HEIGHT: 66 IN | DIASTOLIC BLOOD PRESSURE: 55 MMHG | SYSTOLIC BLOOD PRESSURE: 135 MMHG | TEMPERATURE: 97.3 F | HEART RATE: 66 BPM | BODY MASS INDEX: 24.91 KG/M2

## 2018-11-01 DIAGNOSIS — M54.5 LOW BACK PAIN, UNSPECIFIED BACK PAIN LATERALITY, UNSPECIFIED CHRONICITY, WITH SCIATICA PRESENCE UNSPECIFIED: ICD-10-CM

## 2018-11-01 DIAGNOSIS — D64.9 ANEMIA, UNSPECIFIED TYPE: ICD-10-CM

## 2018-11-01 DIAGNOSIS — D75.9 BONE MARROW DISORDER: Primary | ICD-10-CM

## 2018-11-01 PROCEDURE — 1101F PT FALLS ASSESS-DOCD LE1/YR: CPT | Performed by: INTERNAL MEDICINE

## 2018-11-01 PROCEDURE — G8419 CALC BMI OUT NRM PARAM NOF/U: HCPCS | Performed by: INTERNAL MEDICINE

## 2018-11-01 PROCEDURE — 4040F PNEUMOC VAC/ADMIN/RCVD: CPT | Performed by: INTERNAL MEDICINE

## 2018-11-01 PROCEDURE — G8484 FLU IMMUNIZE NO ADMIN: HCPCS | Performed by: INTERNAL MEDICINE

## 2018-11-01 PROCEDURE — 1123F ACP DISCUSS/DSCN MKR DOCD: CPT | Performed by: INTERNAL MEDICINE

## 2018-11-01 PROCEDURE — 99214 OFFICE O/P EST MOD 30 MIN: CPT | Performed by: INTERNAL MEDICINE

## 2018-11-01 PROCEDURE — 1036F TOBACCO NON-USER: CPT | Performed by: INTERNAL MEDICINE

## 2018-11-01 PROCEDURE — G8598 ASA/ANTIPLAT THER USED: HCPCS | Performed by: INTERNAL MEDICINE

## 2018-11-01 PROCEDURE — G8400 PT W/DXA NO RESULTS DOC: HCPCS | Performed by: INTERNAL MEDICINE

## 2018-11-01 PROCEDURE — 3017F COLORECTAL CA SCREEN DOC REV: CPT | Performed by: INTERNAL MEDICINE

## 2018-11-01 PROCEDURE — G8427 DOCREV CUR MEDS BY ELIG CLIN: HCPCS | Performed by: INTERNAL MEDICINE

## 2018-11-01 PROCEDURE — 1090F PRES/ABSN URINE INCON ASSESS: CPT | Performed by: INTERNAL MEDICINE

## 2018-11-01 ASSESSMENT — ENCOUNTER SYMPTOMS
COLOR CHANGE: 0
EYE ITCHING: 0
VOMITING: 0
CHEST TIGHTNESS: 0
CONSTIPATION: 0
BACK PAIN: 1
EYE REDNESS: 0
SHORTNESS OF BREATH: 0
NAUSEA: 0
COUGH: 0
WHEEZING: 0
DIARRHEA: 0
ABDOMINAL PAIN: 0
BLOOD IN STOOL: 0

## 2018-11-08 ENCOUNTER — HOSPITAL ENCOUNTER (OUTPATIENT)
Dept: LAB | Age: 68
Discharge: HOME | End: 2018-11-08
Payer: MEDICARE

## 2018-11-08 ENCOUNTER — HOSPITAL ENCOUNTER (OUTPATIENT)
Dept: MRI IMAGING | Age: 68
Discharge: HOME | End: 2018-11-10
Payer: MEDICARE

## 2018-11-08 DIAGNOSIS — D75.9 BONE MARROW DISORDER: ICD-10-CM

## 2018-11-08 DIAGNOSIS — M54.5 LOW BACK PAIN, UNSPECIFIED BACK PAIN LATERALITY, UNSPECIFIED CHRONICITY, WITH SCIATICA PRESENCE UNSPECIFIED: ICD-10-CM

## 2018-11-08 DIAGNOSIS — D64.9 ANEMIA, UNSPECIFIED TYPE: ICD-10-CM

## 2018-11-08 LAB
CREAT SERPL-MCNC: 0.61 MG/DL (ref 0.5–0.9)
GFR AFRICAN AMERICAN: >60 ML/MIN
GFR NON-AFRICAN AMERICAN: >60 ML/MIN
GFR SERPL CREATININE-BSD FRML MDRD: NORMAL ML/MIN/{1.73_M2}
GFR SERPL CREATININE-BSD FRML MDRD: NORMAL ML/MIN/{1.73_M2}

## 2018-11-08 PROCEDURE — 72158 MRI LUMBAR SPINE W/O & W/DYE: CPT

## 2018-11-08 PROCEDURE — 82565 ASSAY OF CREATININE: CPT

## 2018-11-08 PROCEDURE — A9579 GAD-BASE MR CONTRAST NOS,1ML: HCPCS | Performed by: INTERNAL MEDICINE

## 2018-11-08 PROCEDURE — 36415 COLL VENOUS BLD VENIPUNCTURE: CPT

## 2018-11-08 PROCEDURE — 6360000004 HC RX CONTRAST MEDICATION: Performed by: INTERNAL MEDICINE

## 2018-11-08 RX ADMIN — GADOTERIDOL 14 ML: 279.3 INJECTION, SOLUTION INTRAVENOUS at 15:07

## 2018-11-15 ENCOUNTER — OFFICE VISIT (OUTPATIENT)
Dept: ONCOLOGY | Age: 68
End: 2018-11-15
Payer: MEDICARE

## 2018-11-15 VITALS
RESPIRATION RATE: 18 BRPM | DIASTOLIC BLOOD PRESSURE: 67 MMHG | TEMPERATURE: 96.6 F | HEIGHT: 66 IN | SYSTOLIC BLOOD PRESSURE: 140 MMHG | HEART RATE: 69 BPM | BODY MASS INDEX: 25.04 KG/M2 | WEIGHT: 155.8 LBS

## 2018-11-15 DIAGNOSIS — D75.9 BONE MARROW DISORDER: ICD-10-CM

## 2018-11-15 DIAGNOSIS — D64.9 ANEMIA, UNSPECIFIED TYPE: Primary | ICD-10-CM

## 2018-11-15 PROCEDURE — 1123F ACP DISCUSS/DSCN MKR DOCD: CPT | Performed by: INTERNAL MEDICINE

## 2018-11-15 PROCEDURE — G8598 ASA/ANTIPLAT THER USED: HCPCS | Performed by: INTERNAL MEDICINE

## 2018-11-15 PROCEDURE — G8427 DOCREV CUR MEDS BY ELIG CLIN: HCPCS | Performed by: INTERNAL MEDICINE

## 2018-11-15 PROCEDURE — 3017F COLORECTAL CA SCREEN DOC REV: CPT | Performed by: INTERNAL MEDICINE

## 2018-11-15 PROCEDURE — G8419 CALC BMI OUT NRM PARAM NOF/U: HCPCS | Performed by: INTERNAL MEDICINE

## 2018-11-15 PROCEDURE — 1101F PT FALLS ASSESS-DOCD LE1/YR: CPT | Performed by: INTERNAL MEDICINE

## 2018-11-15 PROCEDURE — G8400 PT W/DXA NO RESULTS DOC: HCPCS | Performed by: INTERNAL MEDICINE

## 2018-11-15 PROCEDURE — 1036F TOBACCO NON-USER: CPT | Performed by: INTERNAL MEDICINE

## 2018-11-15 PROCEDURE — 4040F PNEUMOC VAC/ADMIN/RCVD: CPT | Performed by: INTERNAL MEDICINE

## 2018-11-15 PROCEDURE — 1090F PRES/ABSN URINE INCON ASSESS: CPT | Performed by: INTERNAL MEDICINE

## 2018-11-15 PROCEDURE — 99213 OFFICE O/P EST LOW 20 MIN: CPT | Performed by: INTERNAL MEDICINE

## 2018-11-15 PROCEDURE — G8484 FLU IMMUNIZE NO ADMIN: HCPCS | Performed by: INTERNAL MEDICINE

## 2018-11-15 ASSESSMENT — ENCOUNTER SYMPTOMS
EYE ITCHING: 0
COUGH: 0
EYE REDNESS: 0
DIARRHEA: 0
CONSTIPATION: 0
BACK PAIN: 0
BLOOD IN STOOL: 0
WHEEZING: 0
SHORTNESS OF BREATH: 0
ABDOMINAL PAIN: 0
CHEST TIGHTNESS: 0
COLOR CHANGE: 0
VOMITING: 0
NAUSEA: 0

## 2018-11-15 NOTE — LETTER
11/15/2018     Urbano Dumont MD   [address] [employer]    Dear Dr. Urbano Dumont MD, and Dr. Marina Valentin ref. provider found: Thank you for referring Kaila Handley, 1950, to me for evaluation. Below are the relevant portions of my assessment and plan of care. [facility] PHYSICIANS  [facility] ONCOLOGY SPECIALISTS  [facility]  [address] [phone]  Dept: [phone]  Dept Fax: [phone]  Kaila Handley is a 74 y.o. female who presentstoday for follow up of her   Chief Complaint   Patient presents with    Anemia         HPI Kaila Handley is a 46-year-old lady with history of low back pain and bilateral hip pain. She has had x-rays of her lumbar spine and that was consistent with lumbar scoliosis with degenerative changes. Bilateral hip degenerative joint disease. She then had an MRI of the lumbosacral spine was found to have marked heterogeneity of the bone marrow suspicious for metastatic disease. Other changes showed disc displacement since the L5-S1 area and broad based protrusion at L3 4 along with facet arthropathy. She was referred to me for evaluation of the abnormal bone marrow finding. Patient has had blood work done in July when her hemoglobin was 10.5. She otherwise denies any other symptoms including fevers or night sweats or weight loss. She does not have any other bone pain except for bilateral hip pain which she's told is from bursitis. Her hemoglobin now is 12.1. MCV is 105.8 which is slightly elevated. Her reticulocyte count is pending but her B12 and folate levels are normal.  She had an MRI of the lumbar spine with and without contrast in that showed only degenerative changes without any bone marrow abnormalities.      Current Outpatient Prescriptions   Medication Sig Dispense Refill    Cholecalciferol (VITAMIN D) 2000 units CAPS capsule Take by mouth      oxybutynin (DITROPAN) 5 MG tablet Take 5 mg by mouth 3 times daily

## 2018-12-06 RX ORDER — ATORVASTATIN CALCIUM 10 MG/1
TABLET, FILM COATED ORAL
Qty: 90 TABLET | Refills: 3 | Status: SHIPPED | OUTPATIENT
Start: 2018-12-06 | End: 2020-04-06 | Stop reason: SDUPTHER

## 2018-12-20 ENCOUNTER — HOSPITAL ENCOUNTER (OUTPATIENT)
Dept: PHYSICAL THERAPY | Age: 68
Setting detail: THERAPIES SERIES
Discharge: HOME | End: 2018-12-20
Payer: MEDICARE

## 2018-12-20 PROCEDURE — 97110 THERAPEUTIC EXERCISES: CPT

## 2018-12-20 PROCEDURE — 97161 PT EVAL LOW COMPLEX 20 MIN: CPT

## 2018-12-20 PROCEDURE — G8979 MOBILITY GOAL STATUS: HCPCS

## 2018-12-20 PROCEDURE — G8978 MOBILITY CURRENT STATUS: HCPCS

## 2018-12-20 NOTE — PROGRESS NOTES
to 4+/5 to support the lower back and hips.        Patient goals : \"ease pain in back and legs\"        Minutes Tracking:  Time In: 2035  Time Out: [address]  Minutes: [address] [place], [place], DPT       12/20/2018

## 2018-12-24 ENCOUNTER — HOSPITAL ENCOUNTER (OUTPATIENT)
Dept: PHYSICAL THERAPY | Age: 68
Setting detail: THERAPIES SERIES
Discharge: HOME | End: 2018-12-24
Payer: MEDICARE

## 2018-12-24 PROCEDURE — 97530 THERAPEUTIC ACTIVITIES: CPT

## 2018-12-24 PROCEDURE — 97110 THERAPEUTIC EXERCISES: CPT

## 2018-12-27 ENCOUNTER — HOSPITAL ENCOUNTER (OUTPATIENT)
Dept: PHYSICAL THERAPY | Age: 68
Setting detail: THERAPIES SERIES
Discharge: HOME | End: 2018-12-27
Payer: MEDICARE

## 2018-12-27 PROCEDURE — 97112 NEUROMUSCULAR REEDUCATION: CPT

## 2018-12-27 PROCEDURE — 97110 THERAPEUTIC EXERCISES: CPT

## 2018-12-31 ENCOUNTER — HOSPITAL ENCOUNTER (OUTPATIENT)
Dept: PHYSICAL THERAPY | Age: 68
Setting detail: THERAPIES SERIES
Discharge: HOME | End: 2018-12-31
Payer: MEDICARE

## 2018-12-31 PROCEDURE — 97140 MANUAL THERAPY 1/> REGIONS: CPT

## 2018-12-31 PROCEDURE — 97110 THERAPEUTIC EXERCISES: CPT

## 2019-01-03 ENCOUNTER — HOSPITAL ENCOUNTER (OUTPATIENT)
Dept: PHYSICAL THERAPY | Age: 69
Setting detail: THERAPIES SERIES
Discharge: HOME | End: 2019-01-03
Payer: MEDICARE

## 2019-01-03 PROCEDURE — 97110 THERAPEUTIC EXERCISES: CPT

## 2019-01-03 PROCEDURE — 97112 NEUROMUSCULAR REEDUCATION: CPT

## 2019-01-08 ENCOUNTER — HOSPITAL ENCOUNTER (OUTPATIENT)
Dept: PHYSICAL THERAPY | Age: 69
Setting detail: THERAPIES SERIES
Discharge: HOME | End: 2019-01-08
Payer: MEDICARE

## 2019-01-08 PROCEDURE — 97110 THERAPEUTIC EXERCISES: CPT

## 2019-01-08 PROCEDURE — 97140 MANUAL THERAPY 1/> REGIONS: CPT

## 2019-01-10 ENCOUNTER — HOSPITAL ENCOUNTER (OUTPATIENT)
Dept: PHYSICAL THERAPY | Age: 69
Setting detail: THERAPIES SERIES
Discharge: HOME | End: 2019-01-10
Payer: MEDICARE

## 2019-01-10 PROCEDURE — 97140 MANUAL THERAPY 1/> REGIONS: CPT

## 2019-01-10 PROCEDURE — 97110 THERAPEUTIC EXERCISES: CPT

## 2019-01-15 ENCOUNTER — HOSPITAL ENCOUNTER (OUTPATIENT)
Dept: PHYSICAL THERAPY | Age: 69
Setting detail: THERAPIES SERIES
Discharge: HOME | End: 2019-01-15
Payer: MEDICARE

## 2019-01-15 PROCEDURE — 97110 THERAPEUTIC EXERCISES: CPT

## 2019-01-17 ENCOUNTER — HOSPITAL ENCOUNTER (OUTPATIENT)
Dept: PHYSICAL THERAPY | Age: 69
Setting detail: THERAPIES SERIES
Discharge: HOME | End: 2019-01-17
Payer: MEDICARE

## 2019-01-17 PROCEDURE — 97110 THERAPEUTIC EXERCISES: CPT

## 2019-01-17 PROCEDURE — 97112 NEUROMUSCULAR REEDUCATION: CPT

## 2019-01-17 PROCEDURE — 97140 MANUAL THERAPY 1/> REGIONS: CPT

## 2019-01-22 ENCOUNTER — HOSPITAL ENCOUNTER (OUTPATIENT)
Dept: PHYSICAL THERAPY | Age: 69
Setting detail: THERAPIES SERIES
Discharge: HOME | End: 2019-01-22
Payer: MEDICARE

## 2019-01-22 PROCEDURE — 97530 THERAPEUTIC ACTIVITIES: CPT

## 2019-01-22 PROCEDURE — 97110 THERAPEUTIC EXERCISES: CPT

## 2019-01-24 ENCOUNTER — HOSPITAL ENCOUNTER (OUTPATIENT)
Dept: PHYSICAL THERAPY | Age: 69
Setting detail: THERAPIES SERIES
Discharge: HOME | End: 2019-01-24
Payer: MEDICARE

## 2019-01-24 PROCEDURE — 97110 THERAPEUTIC EXERCISES: CPT

## 2019-01-24 PROCEDURE — 97112 NEUROMUSCULAR REEDUCATION: CPT

## 2019-01-29 ENCOUNTER — HOSPITAL ENCOUNTER (OUTPATIENT)
Dept: PHYSICAL THERAPY | Age: 69
Setting detail: THERAPIES SERIES
Discharge: HOME | End: 2019-01-29
Payer: MEDICARE

## 2019-01-29 PROCEDURE — G0283 ELEC STIM OTHER THAN WOUND: HCPCS

## 2019-01-29 PROCEDURE — 97012 MECHANICAL TRACTION THERAPY: CPT

## 2019-01-29 PROCEDURE — 97140 MANUAL THERAPY 1/> REGIONS: CPT

## 2019-01-29 PROCEDURE — 97110 THERAPEUTIC EXERCISES: CPT

## 2019-01-31 ENCOUNTER — HOSPITAL ENCOUNTER (OUTPATIENT)
Dept: PHYSICAL THERAPY | Age: 69
Setting detail: THERAPIES SERIES
Discharge: HOME | End: 2019-01-31
Payer: MEDICARE

## 2019-01-31 PROCEDURE — 97110 THERAPEUTIC EXERCISES: CPT

## 2019-04-03 ENCOUNTER — OFFICE VISIT (OUTPATIENT)
Dept: CARDIOLOGY | Age: 69
End: 2019-04-03
Payer: MEDICARE

## 2019-04-03 VITALS
BODY MASS INDEX: 25.58 KG/M2 | OXYGEN SATURATION: 98 % | HEIGHT: 66 IN | WEIGHT: 159.2 LBS | SYSTOLIC BLOOD PRESSURE: 135 MMHG | DIASTOLIC BLOOD PRESSURE: 67 MMHG | HEART RATE: 66 BPM | RESPIRATION RATE: 16 BRPM

## 2019-04-03 DIAGNOSIS — Z72.0 TOBACCO ABUSE: ICD-10-CM

## 2019-04-03 DIAGNOSIS — I25.10 ASHD (ARTERIOSCLEROTIC HEART DISEASE): ICD-10-CM

## 2019-04-03 DIAGNOSIS — I47.1 PSVT (PAROXYSMAL SUPRAVENTRICULAR TACHYCARDIA) (HCC): Primary | ICD-10-CM

## 2019-04-03 PROCEDURE — 1123F ACP DISCUSS/DSCN MKR DOCD: CPT | Performed by: INTERNAL MEDICINE

## 2019-04-03 PROCEDURE — 4040F PNEUMOC VAC/ADMIN/RCVD: CPT | Performed by: INTERNAL MEDICINE

## 2019-04-03 PROCEDURE — G8427 DOCREV CUR MEDS BY ELIG CLIN: HCPCS | Performed by: INTERNAL MEDICINE

## 2019-04-03 PROCEDURE — G8400 PT W/DXA NO RESULTS DOC: HCPCS | Performed by: INTERNAL MEDICINE

## 2019-04-03 PROCEDURE — 3017F COLORECTAL CA SCREEN DOC REV: CPT | Performed by: INTERNAL MEDICINE

## 2019-04-03 PROCEDURE — 99213 OFFICE O/P EST LOW 20 MIN: CPT | Performed by: INTERNAL MEDICINE

## 2019-04-03 PROCEDURE — G8419 CALC BMI OUT NRM PARAM NOF/U: HCPCS | Performed by: INTERNAL MEDICINE

## 2019-04-03 PROCEDURE — 1090F PRES/ABSN URINE INCON ASSESS: CPT | Performed by: INTERNAL MEDICINE

## 2019-04-03 PROCEDURE — 4004F PT TOBACCO SCREEN RCVD TLK: CPT | Performed by: INTERNAL MEDICINE

## 2019-04-03 PROCEDURE — 93000 ELECTROCARDIOGRAM COMPLETE: CPT | Performed by: INTERNAL MEDICINE

## 2019-04-03 PROCEDURE — G8599 NO ASA/ANTIPLAT THER USE RNG: HCPCS | Performed by: INTERNAL MEDICINE

## 2019-04-03 RX ORDER — DILTIAZEM HYDROCHLORIDE 120 MG/1
120 CAPSULE, COATED, EXTENDED RELEASE ORAL DAILY
Qty: 90 CAPSULE | Refills: 3 | Status: SHIPPED | OUTPATIENT
Start: 2019-04-03 | End: 2020-03-16

## 2019-04-03 NOTE — PROGRESS NOTES
Genia Heath am scribing for and in the presence of Francois Lindsay MD, F.A.C.C..    Subjective:     CHIEF COMPLAINT / HPI:    Chief Complaint   Patient presents with    1 Year Follow Up     HX: PSVT, Mild CAD, HTN, Dyslipidemia Pt is here for yearly check up she states she feels a achy chest pain once maya awhile do not last long, she does rarly have palpitations. Denies: SOB, lightheaded/dizziness     Dear Dr Zaria Dubose is 79 y.o. female who presents today for follow-up. Patient initially seen in the hospital on 8/15/2017 because of chest pain. 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 Patient converted to normal sinus rhythm spontaneously. She underwent cardiac catheterization on 8/16/2017 which showed no significant epicardial CAD. She was discharged with an event monitor. She had an episode of paroxysmal supraventricular tachycardia lasting for 35 seconds at a heart rate of 165 bpm recorded. 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 Her metoprolol changed to diltiazem 120 mg daily. Ms. Bri Roberts had hospital admission 7/19/2018 for GI problems and is unable to take aspirin due to GI bleed. Ms. Bri Roberts is here today in follow up. She states she does have occasional chest aches that do not last long and comes about once a week. She denies breaking out in cold sweats or pain traveling to neck or, back or shoulder. No significant shortness of breath. No orthopnea or PND. No change in her weight. No problems with medications. She continues to smoke. She is fairly active she walks a lot at work and is up and down stairs. Risk factors for CAD include hypertension and current smoking. Past Medical History:    Past Medical History:   Diagnosis Date    Abnormal patient-activated cardiac event monitor 09/06/2017    sinus tachycardia with PSVT lasting 36 sec @ 165 bpm.    H/O echocardiogram 08/15/2017    EF 60%.  Mildly increased LV wall thickness normal (arteriosclerotic heart disease)     3. Tobacco abuse         Plan:   · Paroxysmal Supraventricular Tachycardia: Rate Control   · Calcium Channel Blocker: Re-Start  diltiazem 120 mg daily once daily. · She is usually able to terminate episodes of tachycardia by coughing. · Discussed the option of ablation. symptoms currently controlled. I asked her to call us back if her symptoms became frequent or uncontrolled by vagal maneuvers. Atherosclerotic Heart Disease:  Antiplatelet Agent: Not taking due to GI bleed . I also reminded her to watch for signs of blood in her stool or black tarry stools and stop the medication immediately if this develops as this could be life threatening. Statin Therapy: Continue atorvastatin (Lipitor) 10 mg nightly. Chris [facility]c [facility]. · Tobacco: I spent about 5-10 minutes discussing the dangers of tobacco abuse as well as multiple methods for trying to quit smoking. In the end, Ms. Estephanie Orr said that he did not want any help at the current time, but would continue to try and quit smoking completely in the near future. Finally, I recommended that she continue her other medications and follow up with you as previously scheduled. Follow up:   I told Ms. Sharma to call my office if she had any problems, but otherwise told her to Return in about 1 year (around 4/3/2020). However, I would be happy to see her sooner should the need arise. Sincerely,   Agnieszka Sauceda MD, MS, F.A.C.C. [facility]) Cardiology Specialists, [address] [place] [address]  Phone: [phone], Fax: [phone]    I believe that the risk of significant morbidity and mortality related to the patient's current medical conditions are: Intermediate. The documentation recorded by the scribe, accurately and completely reflects the services I personally performed and the decisions made by me. Agnieszka Sauceda MD, F.A.C.C.  April 3, 2019

## 2019-04-03 NOTE — PATIENT INSTRUCTIONS
SURVEY:    You may be receiving a survey from [employer] regarding your visit today. Please complete the survey to enable us to provide the highest quality of care to you and your family. If you cannot score us a very good on any question, please call the office to discuss how we could have made your experience a very good one. Thank you.

## 2019-07-30 ENCOUNTER — HOSPITAL ENCOUNTER (OUTPATIENT)
Dept: VASCULAR LAB | Age: 69
Discharge: HOME | End: 2019-07-30
Payer: MEDICARE

## 2019-07-30 DIAGNOSIS — I77.1 CELIAC ARTERY STENOSIS (HCC): ICD-10-CM

## 2019-07-30 PROCEDURE — 93975 VASCULAR STUDY: CPT

## 2019-08-09 ENCOUNTER — OFFICE VISIT (OUTPATIENT)
Dept: VASCULAR SURGERY | Age: 69
End: 2019-08-09
Payer: MEDICARE

## 2019-08-09 VITALS
SYSTOLIC BLOOD PRESSURE: 142 MMHG | WEIGHT: 154 LBS | TEMPERATURE: 97.6 F | BODY MASS INDEX: 24.75 KG/M2 | DIASTOLIC BLOOD PRESSURE: 67 MMHG | HEIGHT: 66 IN | OXYGEN SATURATION: 97 % | HEART RATE: 64 BPM

## 2019-08-09 DIAGNOSIS — I77.1 CELIAC ARTERY STENOSIS (HCC): Primary | ICD-10-CM

## 2019-08-09 PROCEDURE — G8427 DOCREV CUR MEDS BY ELIG CLIN: HCPCS | Performed by: SURGERY

## 2019-08-09 PROCEDURE — G8400 PT W/DXA NO RESULTS DOC: HCPCS | Performed by: SURGERY

## 2019-08-09 PROCEDURE — 99213 OFFICE O/P EST LOW 20 MIN: CPT | Performed by: SURGERY

## 2019-08-09 PROCEDURE — G8599 NO ASA/ANTIPLAT THER USE RNG: HCPCS | Performed by: SURGERY

## 2019-08-09 PROCEDURE — 3017F COLORECTAL CA SCREEN DOC REV: CPT | Performed by: SURGERY

## 2019-08-09 PROCEDURE — G8420 CALC BMI NORM PARAMETERS: HCPCS | Performed by: SURGERY

## 2019-08-09 PROCEDURE — 1090F PRES/ABSN URINE INCON ASSESS: CPT | Performed by: SURGERY

## 2019-08-09 PROCEDURE — 1123F ACP DISCUSS/DSCN MKR DOCD: CPT | Performed by: SURGERY

## 2019-08-09 PROCEDURE — 4040F PNEUMOC VAC/ADMIN/RCVD: CPT | Performed by: SURGERY

## 2019-08-09 PROCEDURE — 4004F PT TOBACCO SCREEN RCVD TLK: CPT | Performed by: SURGERY

## 2019-08-09 RX ORDER — VALACYCLOVIR HYDROCHLORIDE 500 MG/1
1000 TABLET, FILM COATED ORAL 2 TIMES DAILY PRN
COMMUNITY
Start: 2019-05-03

## 2019-08-18 ASSESSMENT — ENCOUNTER SYMPTOMS
ABDOMINAL PAIN: 0
ALLERGIC/IMMUNOLOGIC NEGATIVE: 1
COLOR CHANGE: 0
GASTROINTESTINAL NEGATIVE: 1
SHORTNESS OF BREATH: 0
CHEST TIGHTNESS: 0

## 2020-01-28 ENCOUNTER — HOSPITAL ENCOUNTER (OUTPATIENT)
Dept: CT IMAGING | Age: 70
Discharge: HOME | End: 2020-01-30
Payer: MEDICARE

## 2020-01-28 PROCEDURE — 70486 CT MAXILLOFACIAL W/O DYE: CPT

## 2020-03-16 ENCOUNTER — OFFICE VISIT (OUTPATIENT)
Dept: NEUROLOGY | Age: 70
End: 2020-03-16
Payer: MEDICARE

## 2020-03-16 VITALS
DIASTOLIC BLOOD PRESSURE: 68 MMHG | BODY MASS INDEX: 24.74 KG/M2 | HEART RATE: 80 BPM | TEMPERATURE: 98.6 F | WEIGHT: 148.5 LBS | HEIGHT: 65 IN | RESPIRATION RATE: 18 BRPM | SYSTOLIC BLOOD PRESSURE: 164 MMHG

## 2020-03-16 PROCEDURE — G8484 FLU IMMUNIZE NO ADMIN: HCPCS | Performed by: NEUROMUSCULOSKELETAL MEDICINE, SPORTS MEDICINE

## 2020-03-16 PROCEDURE — 99203 OFFICE O/P NEW LOW 30 MIN: CPT | Performed by: NEUROMUSCULOSKELETAL MEDICINE, SPORTS MEDICINE

## 2020-03-16 PROCEDURE — G8428 CUR MEDS NOT DOCUMENT: HCPCS | Performed by: NEUROMUSCULOSKELETAL MEDICINE, SPORTS MEDICINE

## 2020-03-16 PROCEDURE — 1090F PRES/ABSN URINE INCON ASSESS: CPT | Performed by: NEUROMUSCULOSKELETAL MEDICINE, SPORTS MEDICINE

## 2020-03-16 PROCEDURE — G8420 CALC BMI NORM PARAMETERS: HCPCS | Performed by: NEUROMUSCULOSKELETAL MEDICINE, SPORTS MEDICINE

## 2020-03-16 PROCEDURE — 4004F PT TOBACCO SCREEN RCVD TLK: CPT | Performed by: NEUROMUSCULOSKELETAL MEDICINE, SPORTS MEDICINE

## 2020-03-16 PROCEDURE — 3017F COLORECTAL CA SCREEN DOC REV: CPT | Performed by: NEUROMUSCULOSKELETAL MEDICINE, SPORTS MEDICINE

## 2020-03-16 PROCEDURE — G8400 PT W/DXA NO RESULTS DOC: HCPCS | Performed by: NEUROMUSCULOSKELETAL MEDICINE, SPORTS MEDICINE

## 2020-03-16 PROCEDURE — 99213 OFFICE O/P EST LOW 20 MIN: CPT

## 2020-03-16 PROCEDURE — 4040F PNEUMOC VAC/ADMIN/RCVD: CPT | Performed by: NEUROMUSCULOSKELETAL MEDICINE, SPORTS MEDICINE

## 2020-03-16 PROCEDURE — 1123F ACP DISCUSS/DSCN MKR DOCD: CPT | Performed by: NEUROMUSCULOSKELETAL MEDICINE, SPORTS MEDICINE

## 2020-03-16 RX ORDER — TOPIRAMATE 25 MG/1
25 TABLET ORAL NIGHTLY
Qty: 30 TABLET | Refills: 1 | Status: SHIPPED | OUTPATIENT
Start: 2020-03-16 | End: 2020-04-27

## 2020-03-16 NOTE — PROGRESS NOTES
NEUROLOGY CONSULT    Patient Name:  Israel Jolley  :   1950  Clinic Visit Date: 3/16/2020    I saw Ms. Israel Jolley  in the neurology clinic today for headaches. The patient is a 28-year-old right-handed lady with hyperlipidemia, hypertension, paroxysmal supraventricular tachycardia, tobacco abuse, chronic anemia, with presenting complaints of recurrent headaches from several years. Headaches usually begin abruptly without any provocation or triggering factors, manifesting as a bilateral occipital headache with a severity of approximately  5-6/10 lasting for variable priods of time. She also experiences a headache every day, however also she gets headaches which are little bit more severe, along with accompanying symptoms of bilateral abnormal visual symptoms manifesting as \"a wave like abnormality \"in both the peripheral visual fields. The latter type of headache occurs approximately 3 times a week. No symptoms of vomiting or nausea double vision loss of vision, tinnitus, lightheadedness or vertigo, loss of hearing, facial numbness or tingling, neck pain or cervical radicular symptoms with the headaches. She usually takes Tylenol with temporary relief. She recently had an ENT work-up which was negative for any underlying sinus problems explaining the headaches. Her mother had migraine headaches and her sister  of a brain tumor      REVIEW OF SYSTEMS    Constitutional Weight changes: absent, change in appetite: absent Fatigue: present; Fevers : absent, Any recent hospitalizations:  absent   HEENT Ears: normal,  Visual disturbance: absent   Respiratory Shortness of breath: absent, choking:  absent, Cough: present, Snoring : absent   Cardiovascular Chest pain: absent, Leg swelling :absent, palpitations : present, fainting : absent   GI Constipation: absent, Diarrhea: absent, Swallowing change: absent    Urinary frequency: absent, Urinary urgency: absent, Urinary incontinence: absent Musculoskeletal Neck pain: present, Back pain: absent, Stiffness: absent, Muscle pain: absent, Joint pain: absent, restless leg : absent   Dermatological Hair loss: absent, Skin changes: absent   Neurological Confusion: absent, Trouble concentrating: absent, Seizures: absent;  Memory loss: absent, balance problem: absent, Dizziness: absent, vertigo: absent, Weakness: absent, Numbness absent, Tremor: absent, Spasm: absent, involuntary movement: absent, Speech difficulty: absent, Headache: present, Light sensitivity: absent   Psychiatric Anxiety: absent, Depression  absent, drug abuse: absent, Hallucination: absent, mood disorder: absent, Suicidal ideations absent   Hematologic Abnormal bleeding: absent, Anemia: absent, Lymph gland changes: absent Clotting disorder: absent     Past Medical History:   Diagnosis Date    Abnormal patient-activated cardiac event monitor 09/06/2017    sinus tachycardia with PSVT lasting 36 sec @ 165 bpm.    H/O echocardiogram 08/15/2017    EF 60%. Mildly increased LV wall thickness normal LV cavity size. No definite specific wall motion abnormalities were identified. No significant valvular abnormalities Mild diastolic dysfunction is seen.     Hypertension     [facility] bone marrow [facility])        Past Surgical History:   Procedure Laterality Date    CARDIAC CATHETERIZATION Left 08/16/2017    [facility]/Dr Garcia    HYSTERECTOMY  1996    UPPER GASTROINTESTINAL ENDOSCOPY N/A 7/21/2018    EGD BIOPSY performed by Amy Field MD at [address] Marital status:      Spouse name: Not on file    Number of children: Not on file    Years of education: Not on file    Highest education level: Not on file   Occupational History    Not on file   Social Needs    Financial resource strain: Not on file    Food insecurity     Worry: Not on file     Inability: Not on file    Transportation needs     Medical: DATA:  Lab Results   Component Value Date    WBC 6.4 10/22/2018    HGB 12.1 10/22/2018     10/22/2018    CHOL 108 08/16/2017    TRIG 89 08/16/2017    HDL 57 08/16/2017    ALT 11 10/22/2018    AST 17 10/22/2018     10/22/2018    K 4.7 10/22/2018    CL 98 10/22/2018    CREATININE 0.61 11/08/2018    BUN 14 10/22/2018    CO2 28 10/22/2018    TSH 1.87 08/15/2017    LABA1C 5.0 08/16/2017       BP (!) 164/68 (Site: Left Upper Arm, Position: Sitting, Cuff Size: Medium Adult)   Pulse 80   Temp 98.6 °F (37 °C) (Tympanic)   Resp 18   Ht 5' 5\" (1.651 m)   Wt 148 lb 8 oz (67.4 kg)   BMI 24.71 kg/m²     NEUROLOGICAL EXAMINATION:     MENTAL STATUS: Patient is alert and oriented. There is no confusion or aphasia. Memory is normal.     CRANIAL NERVES: Pupils are equal and reactive. EOMS are equal in all directions. There is no nystagmus or any other abnormal eye movements. Facial sensation is normal.  There is no facial weakness. There is no loss of hearing. Palate and tongue movements are normal.     MOTOR EXAMINATION: Muscle tone is normal in all the limbs. There is no focal weakness. Muscle strength is 5/5 in both upper and lower limbs. There are no abnormal limb movements. SENSORY EXAMINATION: Normal.     STRETCH REFLEXES: 2+ and symmetrical in both the upper and lower limbs. GAIT: There is no ataxia. IMPRESSION:. In summary, the patient is a 42-year-old lady with a history of recurrent headaches, clinically suggestive of migraine headaches. Normal neurological examination    PLAN:    1.  I have started her on Topamax 25 mg at bedtime as prophylaxis. She was advised to take Tylenol or Excedrin mifgraine at onset of  episodic severe headaches  2. She will maintain a headache diary. 3.  Follow-up in 6 weeks for reevaluation  4. Recommended to stop smoking. NOTE: This neurology evaluation is part of outpatient coverage at [facility]  1-2 days per week.   Patients requiring

## 2020-04-06 RX ORDER — ATORVASTATIN CALCIUM 10 MG/1
10 TABLET, FILM COATED ORAL DAILY
Qty: 90 TABLET | Refills: 3 | Status: SHIPPED | OUTPATIENT
Start: 2020-04-06 | End: 2021-01-01 | Stop reason: SDUPTHER

## 2020-04-27 ENCOUNTER — OFFICE VISIT (OUTPATIENT)
Dept: NEUROLOGY | Age: 70
End: 2020-04-27
Payer: MEDICARE

## 2020-04-27 VITALS
SYSTOLIC BLOOD PRESSURE: 152 MMHG | TEMPERATURE: 98.7 F | BODY MASS INDEX: 23.99 KG/M2 | DIASTOLIC BLOOD PRESSURE: 74 MMHG | WEIGHT: 149.3 LBS | HEART RATE: 73 BPM | HEIGHT: 66 IN | RESPIRATION RATE: 18 BRPM

## 2020-04-27 PROCEDURE — 3017F COLORECTAL CA SCREEN DOC REV: CPT | Performed by: NEUROMUSCULOSKELETAL MEDICINE, SPORTS MEDICINE

## 2020-04-27 PROCEDURE — 4004F PT TOBACCO SCREEN RCVD TLK: CPT | Performed by: NEUROMUSCULOSKELETAL MEDICINE, SPORTS MEDICINE

## 2020-04-27 PROCEDURE — 4040F PNEUMOC VAC/ADMIN/RCVD: CPT | Performed by: NEUROMUSCULOSKELETAL MEDICINE, SPORTS MEDICINE

## 2020-04-27 PROCEDURE — 99213 OFFICE O/P EST LOW 20 MIN: CPT

## 2020-04-27 PROCEDURE — 99213 OFFICE O/P EST LOW 20 MIN: CPT | Performed by: NEUROMUSCULOSKELETAL MEDICINE, SPORTS MEDICINE

## 2020-04-27 PROCEDURE — G8427 DOCREV CUR MEDS BY ELIG CLIN: HCPCS | Performed by: NEUROMUSCULOSKELETAL MEDICINE, SPORTS MEDICINE

## 2020-04-27 PROCEDURE — G8420 CALC BMI NORM PARAMETERS: HCPCS | Performed by: NEUROMUSCULOSKELETAL MEDICINE, SPORTS MEDICINE

## 2020-04-27 PROCEDURE — G8400 PT W/DXA NO RESULTS DOC: HCPCS | Performed by: NEUROMUSCULOSKELETAL MEDICINE, SPORTS MEDICINE

## 2020-04-27 PROCEDURE — 1123F ACP DISCUSS/DSCN MKR DOCD: CPT | Performed by: NEUROMUSCULOSKELETAL MEDICINE, SPORTS MEDICINE

## 2020-04-27 PROCEDURE — 1090F PRES/ABSN URINE INCON ASSESS: CPT | Performed by: NEUROMUSCULOSKELETAL MEDICINE, SPORTS MEDICINE

## 2020-04-27 RX ORDER — TOPIRAMATE 25 MG/1
50 TABLET ORAL NIGHTLY
Qty: 60 TABLET | Refills: 2 | Status: SHIPPED | OUTPATIENT
Start: 2020-04-27 | End: 2020-06-29 | Stop reason: SDUPTHER

## 2020-04-27 NOTE — PATIENT INSTRUCTIONS
and pickled foods). · Limit caffeine. Don't drink too much coffee, tea, or soda. But don't quit caffeine suddenly. That can also give you migraines. · Do not smoke or allow others to smoke around you. If you need help quitting, talk to your doctor about stop-smoking programs and medicines. These can increase your chances of quitting for good. · If you are taking birth control pills or hormone therapy, talk to your doctor about whether they are triggering your migraines. When should you call for help? Call 911 anytime you think you may need emergency care. For example, call if:    · You have signs of a stroke. These may include:  ? Sudden numbness, paralysis, or weakness in your face, arm, or leg, especially on only one side of your body. ? Sudden vision changes. ? Sudden trouble speaking. ? Sudden confusion or trouble understanding simple statements. ? Sudden problems with walking or balance. ? A sudden, severe headache that is different from past headaches.    Call your doctor now or seek immediate medical care if:    · You have new or worse nausea and vomiting.     · You have a new or higher fever.     · Your headache gets much worse.    Watch closely for changes in your health, and be sure to contact your doctor if:    · You are not getting better after 2 days (48 hours). Where can you learn more? Go to https://[employer].Talkable. org and sign in to your [employer] account. Enter H788 in the [facility] box to learn more about \"Migraine Headache: Care Instructions. \"     If you do not have an account, please click on the \"Sign Up Now\" link. Current as of: November 19, 2019Content Version: 12.4  © 2903-5341 Healthwise, Incorporated. Care instructions adapted under license by [facility] ([facility]).  If you have questions about a medical condition or this instruction, always ask your healthcare professional. [address]  any warranty or liability for your use of this

## 2020-05-04 ENCOUNTER — HOSPITAL ENCOUNTER (OUTPATIENT)
Dept: CT IMAGING | Age: 70
Discharge: HOME | End: 2020-05-06
Payer: MEDICARE

## 2020-05-04 PROCEDURE — 70450 CT HEAD/BRAIN W/O DYE: CPT

## 2020-06-29 ENCOUNTER — OFFICE VISIT (OUTPATIENT)
Dept: NEUROLOGY | Age: 70
End: 2020-06-29
Payer: MEDICARE

## 2020-06-29 VITALS
HEIGHT: 66 IN | WEIGHT: 148.4 LBS | DIASTOLIC BLOOD PRESSURE: 59 MMHG | BODY MASS INDEX: 23.85 KG/M2 | RESPIRATION RATE: 18 BRPM | TEMPERATURE: 96.7 F | HEART RATE: 74 BPM | SYSTOLIC BLOOD PRESSURE: 157 MMHG

## 2020-06-29 PROCEDURE — 99213 OFFICE O/P EST LOW 20 MIN: CPT

## 2020-06-29 PROCEDURE — 4040F PNEUMOC VAC/ADMIN/RCVD: CPT | Performed by: NEUROMUSCULOSKELETAL MEDICINE, SPORTS MEDICINE

## 2020-06-29 PROCEDURE — G8427 DOCREV CUR MEDS BY ELIG CLIN: HCPCS | Performed by: NEUROMUSCULOSKELETAL MEDICINE, SPORTS MEDICINE

## 2020-06-29 PROCEDURE — 1090F PRES/ABSN URINE INCON ASSESS: CPT | Performed by: NEUROMUSCULOSKELETAL MEDICINE, SPORTS MEDICINE

## 2020-06-29 PROCEDURE — 3017F COLORECTAL CA SCREEN DOC REV: CPT | Performed by: NEUROMUSCULOSKELETAL MEDICINE, SPORTS MEDICINE

## 2020-06-29 PROCEDURE — 99213 OFFICE O/P EST LOW 20 MIN: CPT | Performed by: NEUROMUSCULOSKELETAL MEDICINE, SPORTS MEDICINE

## 2020-06-29 PROCEDURE — 1123F ACP DISCUSS/DSCN MKR DOCD: CPT | Performed by: NEUROMUSCULOSKELETAL MEDICINE, SPORTS MEDICINE

## 2020-06-29 PROCEDURE — G8400 PT W/DXA NO RESULTS DOC: HCPCS | Performed by: NEUROMUSCULOSKELETAL MEDICINE, SPORTS MEDICINE

## 2020-06-29 PROCEDURE — G8420 CALC BMI NORM PARAMETERS: HCPCS | Performed by: NEUROMUSCULOSKELETAL MEDICINE, SPORTS MEDICINE

## 2020-06-29 PROCEDURE — 4004F PT TOBACCO SCREEN RCVD TLK: CPT | Performed by: NEUROMUSCULOSKELETAL MEDICINE, SPORTS MEDICINE

## 2020-06-29 RX ORDER — TOPIRAMATE 25 MG/1
50 TABLET ORAL NIGHTLY
Qty: 180 TABLET | Refills: 1 | Status: SHIPPED | OUTPATIENT
Start: 2020-06-29 | End: 2020-12-28

## 2020-06-29 NOTE — PATIENT INSTRUCTIONS
off.  · Do not ignore new symptoms that occur with a headache, such as a fever, weakness or numbness, vision changes, or confusion. These may be signs of a more serious problem. To prevent headaches  · Keep a headache diary so you can figure out what triggers your headaches. Avoiding triggers may help you prevent headaches. Record when each headache began, how long it lasted, and what the pain was like (throbbing, aching, stabbing, or dull). Write down any other symptoms you had with the headache, such as nausea, flashing lights or dark spots, or sensitivity to bright light or loud noise. Note if the headache occurred near your period. List anything that might have triggered the headache, such as certain foods (chocolate, cheese, wine) or odors, smoke, bright light, stress, or lack of sleep. · Find healthy ways to deal with stress. Headaches are most common during or right after stressful times. Take time to relax before and after you do something that has caused a headache in the past.  · Try to keep your muscles relaxed by keeping good posture. Check your jaw, face, neck, and shoulder muscles for tension, and try relaxing them. When sitting at a desk, change positions often, and stretch for 30 seconds each hour. · Get plenty of sleep and exercise. · Eat regularly and well. Long periods without food can trigger a headache. · Treat yourself to a massage. Some people find that regular massages are very helpful in relieving tension. · Limit caffeine by not drinking too much coffee, tea, or soda. But don't quit caffeine suddenly, because that can also give you headaches. · Reduce eyestrain from computers by blinking frequently and looking away from the computer screen every so often. Make sure you have proper eyewear and that your monitor is set up properly, about an arm's length away. · Seek help if you have depression or anxiety. Your headaches may be linked to these conditions.  Treatment can both prevent headaches and help with symptoms of anxiety or depression. When should you call for help? [id number] anytime you think you may need emergency care. For example, call if:  · You have signs of a stroke. These may include:  ? Sudden numbness, paralysis, or weakness in your face, arm, or leg, especially on only one side of your body. ? Sudden vision changes. ? Sudden trouble speaking. ? Sudden confusion or trouble understanding simple statements. ? Sudden problems with walking or balance. ? A sudden, severe headache that is different from past headaches. Call your doctor now or seek immediate medical care if:  · You have a new or worse headache. · Your headache gets much worse. Where can you learn more? Go to https://[employer]ander[employer]ust.T2 Systems. org and sign in to your [employer] account. Enter 9151 [id number] in the [employer] box to learn more about \"Headache: Care Instructions. \"     If you do not have an account, please click on the \"Sign Up Now\" link. Current as of: November 20, 2019               Content Version: 12.5  © 7596-6159 Healthwise, Incorporated. Care instructions adapted under license by [facility] ([facility]). If you have questions about a medical condition or this instruction, always ask your healthcare professional. [place] [address] any warranty or liability for your use of this information.

## 2020-07-13 ENCOUNTER — HOSPITAL ENCOUNTER (OUTPATIENT)
Age: 70
Discharge: HOME | End: 2020-07-13
Payer: MEDICARE

## 2020-07-13 ENCOUNTER — OFFICE VISIT (OUTPATIENT)
Dept: CARDIOLOGY | Age: 70
End: 2020-07-13
Payer: MEDICARE

## 2020-07-13 VITALS
HEIGHT: 65 IN | HEART RATE: 53 BPM | WEIGHT: 147.6 LBS | RESPIRATION RATE: 18 BRPM | OXYGEN SATURATION: 98 % | SYSTOLIC BLOOD PRESSURE: 136 MMHG | DIASTOLIC BLOOD PRESSURE: 52 MMHG | BODY MASS INDEX: 24.59 KG/M2

## 2020-07-13 LAB
ANION GAP SERPL CALCULATED.3IONS-SCNC: 13 MMOL/L (ref 9–17)
BUN BLDV-MCNC: 13 MG/DL (ref 8–23)
BUN/CREAT BLD: 19 (ref 9–20)
CALCIUM SERPL-MCNC: 9.3 MG/DL (ref 8.6–10.4)
CHLORIDE BLD-SCNC: 102 MMOL/L (ref 98–107)
CHOLESTEROL/HDL RATIO: 1.7
CHOLESTEROL: 102 MG/DL
CO2: 24 MMOL/L (ref 20–31)
CREAT SERPL-MCNC: 0.67 MG/DL (ref 0.5–0.9)
GFR AFRICAN AMERICAN: >60 ML/MIN
GFR NON-AFRICAN AMERICAN: >60 ML/MIN
GFR SERPL CREATININE-BSD FRML MDRD: ABNORMAL ML/MIN/{1.73_M2}
GFR SERPL CREATININE-BSD FRML MDRD: ABNORMAL ML/MIN/{1.73_M2}
GLUCOSE BLD-MCNC: 103 MG/DL (ref 70–99)
HCT VFR BLD CALC: 41.4 % (ref 36.3–47.1)
HDLC SERPL-MCNC: 60 MG/DL
HEMOGLOBIN: 13 G/DL (ref 11.9–15.1)
LDL CHOLESTEROL: 29 MG/DL (ref 0–130)
MCH RBC QN AUTO: 34.4 PG (ref 25.2–33.5)
MCHC RBC AUTO-ENTMCNC: 31.4 G/DL (ref 28.4–34.8)
MCV RBC AUTO: 109.5 FL (ref 82.6–102.9)
NRBC AUTOMATED: 0 PER 100 WBC
PDW BLD-RTO: 12.7 % (ref 11.8–14.4)
PLATELET # BLD: 187 K/UL (ref 138–453)
PMV BLD AUTO: 9.3 FL (ref 8.1–13.5)
POTASSIUM SERPL-SCNC: 4.1 MMOL/L (ref 3.7–5.3)
RBC # BLD: 3.78 M/UL (ref 3.95–5.11)
SODIUM BLD-SCNC: 139 MMOL/L (ref 135–144)
TRIGL SERPL-MCNC: 65 MG/DL
VLDLC SERPL CALC-MCNC: NORMAL MG/DL (ref 1–30)
WBC # BLD: 6.4 K/UL (ref 3.5–11.3)

## 2020-07-13 PROCEDURE — 93005 ELECTROCARDIOGRAM TRACING: CPT | Performed by: INTERNAL MEDICINE

## 2020-07-13 PROCEDURE — 80048 BASIC METABOLIC PNL TOTAL CA: CPT

## 2020-07-13 PROCEDURE — 99211 OFF/OP EST MAY X REQ PHY/QHP: CPT | Performed by: INTERNAL MEDICINE

## 2020-07-13 PROCEDURE — 85027 COMPLETE CBC AUTOMATED: CPT

## 2020-07-13 PROCEDURE — 4040F PNEUMOC VAC/ADMIN/RCVD: CPT | Performed by: INTERNAL MEDICINE

## 2020-07-13 PROCEDURE — 1090F PRES/ABSN URINE INCON ASSESS: CPT | Performed by: INTERNAL MEDICINE

## 2020-07-13 PROCEDURE — 93010 ELECTROCARDIOGRAM REPORT: CPT | Performed by: INTERNAL MEDICINE

## 2020-07-13 PROCEDURE — 36415 COLL VENOUS BLD VENIPUNCTURE: CPT

## 2020-07-13 PROCEDURE — 3017F COLORECTAL CA SCREEN DOC REV: CPT | Performed by: INTERNAL MEDICINE

## 2020-07-13 PROCEDURE — 99214 OFFICE O/P EST MOD 30 MIN: CPT | Performed by: INTERNAL MEDICINE

## 2020-07-13 PROCEDURE — 4004F PT TOBACCO SCREEN RCVD TLK: CPT | Performed by: INTERNAL MEDICINE

## 2020-07-13 PROCEDURE — G8420 CALC BMI NORM PARAMETERS: HCPCS | Performed by: INTERNAL MEDICINE

## 2020-07-13 PROCEDURE — 80061 LIPID PANEL: CPT

## 2020-07-13 PROCEDURE — G8427 DOCREV CUR MEDS BY ELIG CLIN: HCPCS | Performed by: INTERNAL MEDICINE

## 2020-07-13 PROCEDURE — G8400 PT W/DXA NO RESULTS DOC: HCPCS | Performed by: INTERNAL MEDICINE

## 2020-07-13 PROCEDURE — 1123F ACP DISCUSS/DSCN MKR DOCD: CPT | Performed by: INTERNAL MEDICINE

## 2020-07-13 RX ORDER — LANOLIN ALCOHOL/MO/W.PET/CERES
1000 CREAM (GRAM) TOPICAL DAILY
COMMUNITY

## 2020-07-13 NOTE — PROGRESS NOTES
Manjit Castle am scribing for and in the presence of Sergio Peralta MD, F.A.C.C..    Subjective:     [facility] / HPI:    Chief Complaint   Patient presents with    1 Year Follow Up     EKG done today HX:CAD,PSTV,HTN,HLD Pt is here for follow up she states doing well she does have rare palpitations denies:CP,SOB,lightheaded/dizziness     Dear Dr Naya Stratton is 81 y.o. female who presents today for follow-up. Patient initially seen in the hospital on 8/15/2017 because of chest pain. 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 Patient converted to normal sinus rhythm spontaneously. Risk factors for CAD include hypertension and current smoking. She underwent cardiac catheterization on 8/16/2017 which showed no significant epicardial CAD. She was discharged with an event monitor. She had an episode of paroxysmal supraventricular tachycardia lasting for 35 seconds at a heart rate of 165 bpm recorded. 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 Her metoprolol changed to diltiazem 120 mg daily. Ms. Augustine Barraza had hospital admission 7/19/2018 for GI problems and is unable to take aspirin due to GI bleed. Ms. Augustine Barraza is here today for a 1 year follow up. She states she has been doing well. She has rare palpitations that only lasted less than a minute. Her headaches have been better controlled since starting seeing neurology. Her Topamax dose has increased. She has been losing weight and is excited about this. She is fairly active she walks a lot at work and is up and down stairs. She has been doing fine staying hydrated. She does develop a cough when she lays down at night. She attributes this to her sinusitis/stomach problems. Pratima Bibfarhan this has not changed recently. She denies any chest pain, pressure, or tightness. She denies any shortness of breath, lightheaded/dizziness or palpitations. No orthopnea or PND.  No abdominal pain, bowel issues, bleeding problems or problems with medications at this time. No nausea or vomiting. Past Medical History:    Past Medical History:   Diagnosis Date    Abnormal patient-activated cardiac event monitor 09/06/2017    sinus tachycardia with PSVT lasting 36 sec @ 165 bpm.    H/O echocardiogram 08/15/2017    EF 60%. Mildly increased LV wall thickness normal LV cavity size. No definite specific wall motion abnormalities were identified. No significant valvular abnormalities Mild diastolic dysfunction is seen.     Hypertension     Primary cancer of bone marrow [facility])      Past Surgical History:  Past Surgical History:   Procedure Laterality Date    CARDIAC CATHETERIZATION Left 08/16/2017    left radial/[facility] [place]/ [address]    UPPER GASTROINTESTINAL ENDOSCOPY N/A 7/21/2018    EGD BIOPSY performed by Maya Snowden MD at [address] History:    Social History     Tobacco Use   Smoking Status Current Every Day Smoker    Packs/day: 0.50    Years: 6.00    Pack years: 3.00    Types: Cigarettes   Smokeless Tobacco Never Used     Current Medications:  Outpatient Medications Marked as Taking for the 7/13/20 encounter (Office Visit) with Merlinda Moan, MD   Medication Sig Dispense Refill    vitamin B-12 (CYANOCOBALAMIN) 1000 MCG tablet Take 1,000 mcg by mouth daily      topiramate (TOPAMAX) 25 MG tablet Take 2 tablets by mouth nightly 180 tablet 1    atorvastatin (LIPITOR) 10 MG tablet Take 1 tablet by mouth daily 90 tablet 3    valACYclovir (VALTREX) 500 MG tablet Take 1,000 mg by mouth      Cholecalciferol (VITAMIN D) 2000 units CAPS capsule Take by mouth      oxybutynin (DITROPAN) 5 MG tablet Take 5 mg by mouth 3 times daily      calcium carbonate (OSCAL) 500 MG TABS tablet Take 1,200 mg by mouth daily      Multiple Vitamins-Minerals (THERAPEUTIC MULTIVITAMIN-MINERALS) tablet Take 1 tablet by mouth daily      PARoxetine (PAXIL) 20 MG tablet Take 20 mg by mouth every morning REVIEW OF SYSTEMS:    CONSTITUTIONAL: No major weight gain or loss, fatigue, weakness, night sweats or fever. HEENT: No new vision difficulties or ringing in the ears. RESPIRATORY: see HPI. CARDIOVASCULAR: See HPI  GI: No nausea, vomiting, diarrhea, constipation, abdominal pain or changes in bowel habits. : No urinary frequency, urgency, incontinence hematuria or dysuria. SKIN: No cyanosis or skin lesions. MUSCULOSKELETAL: No new muscle or joint pain. Scoliosis noticed on Exam.  NEUROLOGICAL: No syncope or TIA-like symptoms. PSYCHIATRIC: No anxiety, pain, insomnia or depression    Objective:     PHYSICAL EXAM:      VITALS:  BP (!) 152/54 (Site: Left Upper Arm, Position: Sitting, Cuff Size: Medium Adult)   Pulse 57   Resp 18   Ht 5' 5\" (1.651 m)   Wt 147 lb 9.6 oz (67 kg)   SpO2 98%   BMI 24.56 kg/m²   CONSTITUTIONAL: Cooperative, no apparent distress, and appears well nourished / developed. NEUROLOGIC:  Awake and orientated to person, place and time. PSYCH: Calm affect. SKIN: Warm and dry. HEENT: Sclera non-icteric, normocephalic, neck supple, no elevation of JVP, normal carotid pulses with no bruits and thyroid normal size. LUNGS:  No increased work of breathing and clear to auscultation, no crackles or wheezing. Cardiovascular: Normal rate, regular rhythm, normal heart sounds. Exam reveals no gallop and no friction rubs. No murmur was heard. .  ABDOMEN:  Normal bowel sounds, non-distended and non-tender to palpation. Extremities: No significant edema. No cyanosis or clubbing. 2+ radial and carotid pulses. Distal extremity pulses: 2+ bilaterally. Irais Perez       DATA:    Lab Results   Component Value Date    ALT 11 10/22/2018    AST 17 10/22/2018    ALKPHOS 79 10/22/2018    BILITOT 0.36 10/22/2018     Lab Results   Component Value Date    CREATININE 0.61 11/08/2018    BUN 14 10/22/2018     10/22/2018    K 4.7 10/22/2018    CL 98 10/22/2018    CO2 28 10/22/2018     Lab Results   Component Value Date    TSH 1.87 08/15/2017     Lab Results   Component Value Date    WBC 6.4 10/22/2018    HGB 12.1 10/22/2018    HCT 38.4 10/22/2018    .8 (H) 10/22/2018     10/22/2018       Lab Results   Component Value Date    TRIG 89 08/16/2017     Lab Results   Component Value Date    HDL 57 08/16/2017     Lab Results   Component Value Date    LDLCHOLESTEROL 33 08/16/2017       Assessment:      Diagnosis Orders   1. PSVT (paroxysmal supraventricular tachycardia) ([facility])  EKG 12 lead    Basic Metabolic Panel    CBC    Lipid Panel   2. ASHD (arteriosclerotic heart disease)  Basic Metabolic Panel    CBC    Lipid Panel   3. Mixed hyperlipidemia     4. Tobacco abuse counseling  Basic Metabolic Panel    CBC    Lipid Panel       Plan:   · Paroxysmal Supraventricular Tachycardia: Rate Control   · Currently controlled, continue conservative management. · She is usually able to terminate episodes of tachycardia by coughing. · Discussed the option of ablation. symptoms currently controlled. I asked her to call us back if her symptoms became frequent or uncontrolled by vagal maneuvers. Atherosclerotic Heart Disease: Mild, nonobstructive CAD by cardiac cath in August 2017. Antiplatelet Agent: Not taking due to GI bleed . I also reminded her to watch for signs of blood in her stool or black tarry stools and stop the medication immediately if this develops as this could be life threatening. Statin Therapy: Continue atorvastatin (Lipitor) 10 mg nightly. .    Additional Testing List: I took the liberty of ordering a BMP for today to assess their potassium and renal function and I took the liberty of ordering a CBC     · Hyperlipidemia: Mixed   · Cholesterol Reduction Therapy: Continue Atorvastatin (Lipitor) 10 mg daily     · Laboratory testing: Lipid Panel to assess levels    · Tobacco Abuse Counseling:   · I spent about 5-10 minutes discussing the dangers of tobacco abuse as well as multiple methods for trying to quit smoking. In the end, Ms. Chaz Valdez said that he did not want any help at the current time, but would continue to try and quit smoking completely in the near future. Finally, I recommended that she continue her other medications and follow up with you as previously scheduled. Follow up:   I told Ms. Sharma to call my office if she had any problems, but otherwise told her to Return in about 1 year (around 7/13/2021). However, I would be happy to see her sooner should the need arise. Sincerely,   Leslee Navas MD, MS, F.A.C.C. [facility]) [facility], [address] [facility], [address]  Phone: [phone], Fax: [phone]    I believe that the risk of significant morbidity and mortality related to the patient's current medical conditions are: Intermediate. The documentation recorded by the scribe, accurately and completely reflects the services I personally performed and the decisions made by me. Leslee Navas MD, F.A.C.C.  July 13, 2020

## 2020-07-13 NOTE — PATIENT INSTRUCTIONS
SURVEY:    You may be receiving a survey from [employer] regarding your visit today. Please complete the survey to enable us to provide the highest quality of care to you and your family. If you cannot score us a very good on any question, please call the office to discuss how we could have made your experience a very good one. Thank you.

## 2020-07-14 ENCOUNTER — TELEPHONE (OUTPATIENT)
Dept: CARDIOLOGY | Age: 70
End: 2020-07-14

## 2020-07-14 ENCOUNTER — TELEPHONE (OUTPATIENT)
Dept: VASCULAR SURGERY | Age: 70
End: 2020-07-14

## 2020-07-14 NOTE — TELEPHONE ENCOUNTER
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

## 2020-11-06 ENCOUNTER — HOSPITAL ENCOUNTER (OUTPATIENT)
Age: 70
Discharge: HOME | End: 2020-11-08
Payer: MEDICARE

## 2020-11-06 ENCOUNTER — HOSPITAL ENCOUNTER (OUTPATIENT)
Dept: GENERAL RADIOLOGY | Age: 70
Discharge: HOME | End: 2020-11-08
Payer: MEDICARE

## 2020-11-06 PROCEDURE — 72100 X-RAY EXAM L-S SPINE 2/3 VWS: CPT

## 2020-12-28 RX ORDER — TOPIRAMATE 25 MG/1
TABLET ORAL
Qty: 180 TABLET | Refills: 1 | Status: SHIPPED | OUTPATIENT
Start: 2020-12-28 | End: 2020-12-28 | Stop reason: SDUPTHER

## 2020-12-28 RX ORDER — TOPIRAMATE 25 MG/1
50 TABLET ORAL DAILY
Qty: 180 TABLET | Refills: 1 | Status: SHIPPED | OUTPATIENT
Start: 2020-12-28 | End: 2021-01-01

## 2021-01-01 ENCOUNTER — HOSPITAL ENCOUNTER (OUTPATIENT)
Dept: NON INVASIVE DIAGNOSTICS | Age: 71
Discharge: HOME | End: 2021-07-13
Payer: MEDICARE

## 2021-01-01 ENCOUNTER — HOSPITAL ENCOUNTER (OUTPATIENT)
Age: 71
Discharge: HOME | End: 2021-07-07
Payer: MEDICARE

## 2021-01-01 ENCOUNTER — TELEPHONE (OUTPATIENT)
Dept: CARDIOLOGY | Age: 71
End: 2021-01-01

## 2021-01-01 ENCOUNTER — HOSPITAL ENCOUNTER (OUTPATIENT)
Age: 71
Discharge: HOME | End: 2021-06-28
Payer: MEDICARE

## 2021-01-01 ENCOUNTER — APPOINTMENT (OUTPATIENT)
Dept: CT IMAGING | Age: 71
DRG: 305 | End: 2021-01-01
Payer: MEDICARE

## 2021-01-01 ENCOUNTER — HOSPITAL ENCOUNTER (OUTPATIENT)
Dept: VASCULAR LAB | Age: 71
Discharge: HOME | End: 2021-11-11
Payer: MEDICARE

## 2021-01-01 ENCOUNTER — HOSPITAL ENCOUNTER (OUTPATIENT)
Age: 71
Discharge: HOME | End: 2021-09-02
Payer: MEDICARE

## 2021-01-01 ENCOUNTER — APPOINTMENT (OUTPATIENT)
Dept: GENERAL RADIOLOGY | Age: 71
DRG: 305 | End: 2021-01-01
Payer: MEDICARE

## 2021-01-01 ENCOUNTER — OFFICE VISIT (OUTPATIENT)
Dept: CARDIOLOGY | Age: 71
End: 2021-01-01
Payer: MEDICARE

## 2021-01-01 ENCOUNTER — HOSPITAL ENCOUNTER (INPATIENT)
Age: 71
LOS: 2 days | Discharge: HOME | DRG: 305 | End: 2021-08-09
Attending: INTERNAL MEDICINE | Admitting: INTERNAL MEDICINE
Payer: MEDICARE

## 2021-01-01 ENCOUNTER — HOSPITAL ENCOUNTER (OUTPATIENT)
Age: 71
Discharge: HOME | End: 2021-06-30
Payer: MEDICARE

## 2021-01-01 ENCOUNTER — HOSPITAL ENCOUNTER (OUTPATIENT)
Dept: NON INVASIVE DIAGNOSTICS | Age: 71
Discharge: HOME | End: 2021-11-22
Payer: MEDICARE

## 2021-01-01 ENCOUNTER — OFFICE VISIT (OUTPATIENT)
Dept: VASCULAR SURGERY | Age: 71
End: 2021-01-01
Payer: MEDICARE

## 2021-01-01 ENCOUNTER — HOSPITAL ENCOUNTER (OUTPATIENT)
Dept: MRI IMAGING | Age: 71
Discharge: HOME | End: 2021-09-05
Payer: MEDICARE

## 2021-01-01 ENCOUNTER — TELEPHONE (OUTPATIENT)
Dept: VASCULAR SURGERY | Age: 71
End: 2021-01-01

## 2021-01-01 ENCOUNTER — HOSPITAL ENCOUNTER (OUTPATIENT)
Dept: VASCULAR LAB | Age: 71
Discharge: HOME | End: 2021-10-28
Payer: MEDICARE

## 2021-01-01 ENCOUNTER — HOSPITAL ENCOUNTER (EMERGENCY)
Age: 71
Discharge: HOME | End: 2021-08-20
Attending: EMERGENCY MEDICINE
Payer: MEDICARE

## 2021-01-01 ENCOUNTER — HOSPITAL ENCOUNTER (OUTPATIENT)
Dept: GENERAL RADIOLOGY | Age: 71
Discharge: HOME | End: 2021-06-30
Payer: MEDICARE

## 2021-01-01 ENCOUNTER — HOSPITAL ENCOUNTER (OUTPATIENT)
Dept: NON INVASIVE DIAGNOSTICS | Age: 71
Discharge: HOME | End: 2021-09-03
Payer: MEDICARE

## 2021-01-01 ENCOUNTER — HOSPITAL ENCOUNTER (OUTPATIENT)
Dept: GENERAL RADIOLOGY | Age: 71
Discharge: HOME | End: 2021-10-27
Payer: MEDICARE

## 2021-01-01 ENCOUNTER — HOSPITAL ENCOUNTER (OUTPATIENT)
Age: 71
Discharge: HOME | End: 2021-10-25
Payer: MEDICARE

## 2021-01-01 ENCOUNTER — HOSPITAL ENCOUNTER (OUTPATIENT)
Dept: VASCULAR LAB | Age: 71
Discharge: HOME | End: 2021-09-30
Payer: MEDICARE

## 2021-01-01 ENCOUNTER — OFFICE VISIT (OUTPATIENT)
Dept: NEUROLOGY | Age: 71
End: 2021-01-01
Payer: MEDICARE

## 2021-01-01 ENCOUNTER — APPOINTMENT (OUTPATIENT)
Dept: GENERAL RADIOLOGY | Age: 71
End: 2021-01-01
Payer: MEDICARE

## 2021-01-01 ENCOUNTER — HOSPITAL ENCOUNTER (OUTPATIENT)
Age: 71
Discharge: HOME | End: 2021-10-27
Payer: MEDICARE

## 2021-01-01 VITALS
HEART RATE: 53 BPM | BODY MASS INDEX: 22.34 KG/M2 | RESPIRATION RATE: 18 BRPM | HEIGHT: 66 IN | TEMPERATURE: 97.5 F | DIASTOLIC BLOOD PRESSURE: 63 MMHG | SYSTOLIC BLOOD PRESSURE: 132 MMHG | WEIGHT: 139 LBS

## 2021-01-01 VITALS
HEIGHT: 66 IN | SYSTOLIC BLOOD PRESSURE: 118 MMHG | DIASTOLIC BLOOD PRESSURE: 58 MMHG | HEART RATE: 72 BPM | BODY MASS INDEX: 22.07 KG/M2 | TEMPERATURE: 98 F | WEIGHT: 137.3 LBS | RESPIRATION RATE: 18 BRPM

## 2021-01-01 VITALS
RESPIRATION RATE: 18 BRPM | HEIGHT: 66 IN | DIASTOLIC BLOOD PRESSURE: 64 MMHG | HEART RATE: 61 BPM | SYSTOLIC BLOOD PRESSURE: 196 MMHG | BODY MASS INDEX: 22.63 KG/M2 | WEIGHT: 140.8 LBS | OXYGEN SATURATION: 98 %

## 2021-01-01 VITALS
DIASTOLIC BLOOD PRESSURE: 59 MMHG | OXYGEN SATURATION: 92 % | WEIGHT: 140 LBS | TEMPERATURE: 96.8 F | HEART RATE: 77 BPM | BODY MASS INDEX: 22.5 KG/M2 | RESPIRATION RATE: 16 BRPM | SYSTOLIC BLOOD PRESSURE: 156 MMHG | HEIGHT: 66 IN

## 2021-01-01 VITALS
HEIGHT: 67 IN | OXYGEN SATURATION: 99 % | BODY MASS INDEX: 22.32 KG/M2 | SYSTOLIC BLOOD PRESSURE: 140 MMHG | WEIGHT: 142.2 LBS | RESPIRATION RATE: 18 BRPM | DIASTOLIC BLOOD PRESSURE: 65 MMHG | HEART RATE: 70 BPM

## 2021-01-01 VITALS
WEIGHT: 139.6 LBS | HEIGHT: 66 IN | DIASTOLIC BLOOD PRESSURE: 64 MMHG | OXYGEN SATURATION: 97 % | SYSTOLIC BLOOD PRESSURE: 137 MMHG | HEART RATE: 65 BPM | RESPIRATION RATE: 18 BRPM | BODY MASS INDEX: 22.43 KG/M2

## 2021-01-01 VITALS
HEIGHT: 67 IN | WEIGHT: 140.9 LBS | SYSTOLIC BLOOD PRESSURE: 129 MMHG | TEMPERATURE: 97.7 F | RESPIRATION RATE: 18 BRPM | BODY MASS INDEX: 22.11 KG/M2 | DIASTOLIC BLOOD PRESSURE: 61 MMHG | HEART RATE: 65 BPM

## 2021-01-01 VITALS
WEIGHT: 139.4 LBS | RESPIRATION RATE: 18 BRPM | OXYGEN SATURATION: 97 % | DIASTOLIC BLOOD PRESSURE: 85 MMHG | HEIGHT: 66 IN | BODY MASS INDEX: 22.4 KG/M2 | SYSTOLIC BLOOD PRESSURE: 172 MMHG | HEART RATE: 59 BPM

## 2021-01-01 VITALS
RESPIRATION RATE: 16 BRPM | OXYGEN SATURATION: 96 % | SYSTOLIC BLOOD PRESSURE: 119 MMHG | TEMPERATURE: 97 F | DIASTOLIC BLOOD PRESSURE: 43 MMHG | BODY MASS INDEX: 21.97 KG/M2 | HEIGHT: 67 IN | HEART RATE: 63 BPM | WEIGHT: 140 LBS

## 2021-01-01 VITALS
TEMPERATURE: 97.6 F | DIASTOLIC BLOOD PRESSURE: 76 MMHG | HEIGHT: 67 IN | RESPIRATION RATE: 18 BRPM | WEIGHT: 138.4 LBS | SYSTOLIC BLOOD PRESSURE: 109 MMHG | HEART RATE: 68 BPM | BODY MASS INDEX: 21.72 KG/M2

## 2021-01-01 DIAGNOSIS — E78.2 MIXED HYPERLIPIDEMIA: ICD-10-CM

## 2021-01-01 DIAGNOSIS — I49.3 FREQUENT PVCS: ICD-10-CM

## 2021-01-01 DIAGNOSIS — I10 HYPERTENSION, UNCONTROLLED: ICD-10-CM

## 2021-01-01 DIAGNOSIS — I25.10 ASHD (ARTERIOSCLEROTIC HEART DISEASE): ICD-10-CM

## 2021-01-01 DIAGNOSIS — R00.1 BRADYCARDIA: ICD-10-CM

## 2021-01-01 DIAGNOSIS — I47.1 PSVT (PAROXYSMAL SUPRAVENTRICULAR TACHYCARDIA) (HCC): ICD-10-CM

## 2021-01-01 DIAGNOSIS — R29.6 RECURRENT FALLS: ICD-10-CM

## 2021-01-01 DIAGNOSIS — R40.20 LOC (LOSS OF CONSCIOUSNESS) (HCC): ICD-10-CM

## 2021-01-01 DIAGNOSIS — I65.23 BILATERAL CAROTID ARTERY STENOSIS: Primary | ICD-10-CM

## 2021-01-01 DIAGNOSIS — I10 ESSENTIAL HYPERTENSION: ICD-10-CM

## 2021-01-01 DIAGNOSIS — I25.10 ASHD (ARTERIOSCLEROTIC HEART DISEASE): Primary | ICD-10-CM

## 2021-01-01 DIAGNOSIS — R42 DIZZINESS: Primary | ICD-10-CM

## 2021-01-01 DIAGNOSIS — I95.89 HYPOTENSION DUE TO HYPOVOLEMIA: ICD-10-CM

## 2021-01-01 DIAGNOSIS — I16.0 HYPERTENSIVE URGENCY: Primary | ICD-10-CM

## 2021-01-01 DIAGNOSIS — R00.2 PALPITATIONS: ICD-10-CM

## 2021-01-01 DIAGNOSIS — E86.1 HYPOTENSION DUE TO HYPOVOLEMIA: ICD-10-CM

## 2021-01-01 DIAGNOSIS — I65.23 BILATERAL CAROTID ARTERY STENOSIS: ICD-10-CM

## 2021-01-01 DIAGNOSIS — S09.90XA INJURY OF HEAD, INITIAL ENCOUNTER: ICD-10-CM

## 2021-01-01 DIAGNOSIS — I73.9 PERIPHERAL VASCULAR DISEASE (HCC): ICD-10-CM

## 2021-01-01 DIAGNOSIS — D47.2 MONOCLONAL GAMMOPATHY: ICD-10-CM

## 2021-01-01 DIAGNOSIS — R07.9 CHEST PAIN, UNSPECIFIED TYPE: ICD-10-CM

## 2021-01-01 DIAGNOSIS — I73.9 PAD (PERIPHERAL ARTERY DISEASE) (HCC): ICD-10-CM

## 2021-01-01 DIAGNOSIS — I65.29 STENOSIS OF CAROTID ARTERY, UNSPECIFIED LATERALITY: ICD-10-CM

## 2021-01-01 DIAGNOSIS — R42 DIZZINESS: ICD-10-CM

## 2021-01-01 DIAGNOSIS — R40.20 LOC (LOSS OF CONSCIOUSNESS) (HCC): Primary | ICD-10-CM

## 2021-01-01 DIAGNOSIS — I65.21 RIGHT-SIDED EXTRACRANIAL CAROTID ARTERY STENOSIS: ICD-10-CM

## 2021-01-01 DIAGNOSIS — Z98.890 HISTORY OF RIGHT-SIDED CAROTID ENDARTERECTOMY: ICD-10-CM

## 2021-01-01 DIAGNOSIS — I65.21 STENOSIS OF RIGHT CAROTID ARTERY: ICD-10-CM

## 2021-01-01 DIAGNOSIS — G43.109 MIGRAINE WITH AURA AND WITHOUT STATUS MIGRAINOSUS, NOT INTRACTABLE: Primary | ICD-10-CM

## 2021-01-01 DIAGNOSIS — Z71.6 TOBACCO ABUSE COUNSELING: ICD-10-CM

## 2021-01-01 DIAGNOSIS — R05.3 CHRONIC COUGH: ICD-10-CM

## 2021-01-01 LAB
-: NORMAL
ABSOLUTE EOS #: 0.04 K/UL (ref 0–0.44)
ABSOLUTE EOS #: 0.05 K/UL (ref 0–0.44)
ABSOLUTE EOS #: 0.07 K/UL (ref 0–0.44)
ABSOLUTE EOS #: 0.11 K/UL (ref 0–0.44)
ABSOLUTE EOS #: 0.14 K/UL (ref 0–0.44)
ABSOLUTE IMMATURE GRANULOCYTE: <0.03 K/UL (ref 0–0.3)
ABSOLUTE LYMPH #: 1.41 K/UL (ref 1.1–3.7)
ABSOLUTE LYMPH #: 1.54 K/UL (ref 1.1–3.7)
ABSOLUTE LYMPH #: 1.55 K/UL (ref 1.1–3.7)
ABSOLUTE LYMPH #: 1.9 K/UL (ref 1.1–3.7)
ABSOLUTE LYMPH #: 2.24 K/UL (ref 1.1–3.7)
ABSOLUTE MONO #: 0.26 K/UL (ref 0.1–1.2)
ABSOLUTE MONO #: 0.28 K/UL (ref 0.1–1.2)
ABSOLUTE MONO #: 0.32 K/UL (ref 0.1–1.2)
ABSOLUTE MONO #: 0.33 K/UL (ref 0.1–1.2)
ABSOLUTE MONO #: 0.33 K/UL (ref 0.1–1.2)
ALBUMIN (CALCULATED): 3.8 G/DL (ref 3.2–5.2)
ALBUMIN (CALCULATED): 4.2 G/DL (ref 3.2–5.2)
ALBUMIN PERCENT: 49 % (ref 45–65)
ALBUMIN PERCENT: 51 % (ref 45–65)
ALBUMIN SERPL-MCNC: 3.5 G/DL (ref 3.5–5.2)
ALBUMIN SERPL-MCNC: 3.6 G/DL (ref 3.5–5.2)
ALBUMIN SERPL-MCNC: 3.6 G/DL (ref 3.5–5.2)
ALBUMIN/GLOBULIN RATIO: 0.7 (ref 1–2.5)
ALBUMIN/GLOBULIN RATIO: 0.8 (ref 1–2.5)
ALP BLD-CCNC: 70 U/L (ref 35–104)
ALP BLD-CCNC: 76 U/L (ref 35–104)
ALP BLD-CCNC: 82 U/L (ref 35–104)
ALP BLD-CCNC: 86 U/L (ref 35–104)
ALP BLD-CCNC: 89 U/L (ref 35–104)
ALPHA 1 PERCENT: 2 % (ref 3–6)
ALPHA 1 PERCENT: 2 % (ref 3–6)
ALPHA 2 PERCENT: 6 % (ref 6–13)
ALPHA 2 PERCENT: 6 % (ref 6–13)
ALPHA-1-GLOBULIN: 0.1 G/DL (ref 0.1–0.4)
ALPHA-1-GLOBULIN: 0.2 G/DL (ref 0.1–0.4)
ALPHA-2-GLOBULIN: 0.5 G/DL (ref 0.5–0.9)
ALPHA-2-GLOBULIN: 0.5 G/DL (ref 0.5–0.9)
ALT SERPL-CCNC: 11 U/L (ref 5–33)
ALT SERPL-CCNC: 13 U/L (ref 5–33)
ALT SERPL-CCNC: 7 U/L (ref 5–33)
ALT SERPL-CCNC: 8 U/L (ref 5–33)
ALT SERPL-CCNC: 8 U/L (ref 5–33)
AMORPHOUS: NORMAL
ANION GAP SERPL CALCULATED.3IONS-SCNC: 11 MMOL/L (ref 9–17)
ANION GAP SERPL CALCULATED.3IONS-SCNC: 12 MMOL/L (ref 9–17)
ANION GAP SERPL CALCULATED.3IONS-SCNC: 12 MMOL/L (ref 9–17)
ANION GAP SERPL CALCULATED.3IONS-SCNC: 13 MMOL/L (ref 9–17)
ANION GAP SERPL CALCULATED.3IONS-SCNC: 13 MMOL/L (ref 9–17)
ANION GAP SERPL CALCULATED.3IONS-SCNC: 15 MMOL/L (ref 9–17)
AST SERPL-CCNC: 13 U/L
AST SERPL-CCNC: 13 U/L
AST SERPL-CCNC: 15 U/L
AST SERPL-CCNC: 16 U/L
AST SERPL-CCNC: 17 U/L
BACTERIA: NORMAL
BASOPHILS # BLD: 0 % (ref 0–2)
BASOPHILS # BLD: 0 % (ref 0–2)
BASOPHILS # BLD: 1 % (ref 0–2)
BASOPHILS ABSOLUTE: <0.03 K/UL (ref 0–0.2)
BETA GLOBULIN: 3 G/DL (ref 0.5–1.1)
BETA GLOBULIN: 3 G/DL (ref 0.5–1.1)
BETA PERCENT: 37 % (ref 11–19)
BETA PERCENT: 39 % (ref 11–19)
BILIRUB SERPL-MCNC: 0.24 MG/DL (ref 0.3–1.2)
BILIRUB SERPL-MCNC: 0.4 MG/DL (ref 0.3–1.2)
BILIRUB SERPL-MCNC: 0.47 MG/DL (ref 0.3–1.2)
BILIRUB SERPL-MCNC: 0.47 MG/DL (ref 0.3–1.2)
BILIRUB SERPL-MCNC: 0.6 MG/DL (ref 0.3–1.2)
BILIRUBIN URINE: NEGATIVE
BNP INTERPRETATION: ABNORMAL
BUN BLDV-MCNC: 15 MG/DL (ref 8–23)
BUN BLDV-MCNC: 18 MG/DL (ref 8–23)
BUN BLDV-MCNC: 18 MG/DL (ref 8–23)
BUN BLDV-MCNC: 19 MG/DL (ref 8–23)
BUN BLDV-MCNC: 21 MG/DL (ref 8–23)
BUN BLDV-MCNC: 34 MG/DL (ref 8–23)
BUN/CREAT BLD: 21 (ref 9–20)
BUN/CREAT BLD: 21 (ref 9–20)
BUN/CREAT BLD: 26 (ref 9–20)
BUN/CREAT BLD: 27 (ref 9–20)
BUN/CREAT BLD: 31 (ref 9–20)
BUN/CREAT BLD: 35 (ref 9–20)
CALCIUM SERPL-MCNC: 8.7 MG/DL (ref 8.6–10.4)
CALCIUM SERPL-MCNC: 9 MG/DL (ref 8.6–10.4)
CALCIUM SERPL-MCNC: 9.2 MG/DL (ref 8.6–10.4)
CALCIUM SERPL-MCNC: 9.3 MG/DL (ref 8.6–10.4)
CALCIUM SERPL-MCNC: 9.4 MG/DL (ref 8.6–10.4)
CALCIUM SERPL-MCNC: 9.8 MG/DL (ref 8.6–10.4)
CASTS UA: NORMAL /LPF
CHLORIDE BLD-SCNC: 101 MMOL/L (ref 98–107)
CHLORIDE BLD-SCNC: 102 MMOL/L (ref 98–107)
CHLORIDE BLD-SCNC: 106 MMOL/L (ref 98–107)
CHLORIDE BLD-SCNC: 98 MMOL/L (ref 98–107)
CHOLESTEROL/HDL RATIO: 1.5
CHOLESTEROL: 94 MG/DL
CO2: 22 MMOL/L (ref 20–31)
CO2: 23 MMOL/L (ref 20–31)
CO2: 23 MMOL/L (ref 20–31)
CO2: 25 MMOL/L (ref 20–31)
CO2: 25 MMOL/L (ref 20–31)
CO2: 27 MMOL/L (ref 20–31)
COLOR: YELLOW
COMMENT UA: NORMAL
CREAT SERPL-MCNC: 0.66 MG/DL (ref 0.5–0.9)
CREAT SERPL-MCNC: 0.68 MG/DL (ref 0.5–0.9)
CREAT SERPL-MCNC: 0.72 MG/DL (ref 0.5–0.9)
CREAT SERPL-MCNC: 0.74 MG/DL (ref 0.5–0.9)
CREAT SERPL-MCNC: 0.86 MG/DL (ref 0.5–0.9)
CREAT SERPL-MCNC: 0.96 MG/DL (ref 0.5–0.9)
CRYSTALS, UA: NORMAL /HPF
DIFFERENTIAL TYPE: ABNORMAL
EKG ATRIAL RATE: 48 BPM
EKG ATRIAL RATE: 57 BPM
EKG P AXIS: 77 DEGREES
EKG P AXIS: 80 DEGREES
EKG P-R INTERVAL: 196 MS
EKG P-R INTERVAL: 224 MS
EKG Q-T INTERVAL: 432 MS
EKG Q-T INTERVAL: 454 MS
EKG QRS DURATION: 104 MS
EKG QRS DURATION: 96 MS
EKG QTC CALCULATION (BAZETT): 405 MS
EKG QTC CALCULATION (BAZETT): 420 MS
EKG R AXIS: 33 DEGREES
EKG R AXIS: 62 DEGREES
EKG T AXIS: 47 DEGREES
EKG T AXIS: 50 DEGREES
EKG VENTRICULAR RATE: 48 BPM
EKG VENTRICULAR RATE: 57 BPM
EOSINOPHILS RELATIVE PERCENT: 1 % (ref 1–4)
EOSINOPHILS RELATIVE PERCENT: 1 % (ref 1–4)
EOSINOPHILS RELATIVE PERCENT: 2 % (ref 1–4)
EOSINOPHILS RELATIVE PERCENT: 2 % (ref 1–4)
EOSINOPHILS RELATIVE PERCENT: 3 % (ref 1–4)
EPITHELIAL CELLS UA: NORMAL /HPF (ref 0–25)
GAMMA GLOBULIN %: 4 % (ref 9–20)
GAMMA GLOBULIN %: 4 % (ref 9–20)
GAMMA GLOBULIN: 0.3 G/DL (ref 0.5–1.5)
GAMMA GLOBULIN: 0.3 G/DL (ref 0.5–1.5)
GFR AFRICAN AMERICAN: >60 ML/MIN
GFR NON-AFRICAN AMERICAN: 58 ML/MIN
GFR NON-AFRICAN AMERICAN: >60 ML/MIN
GFR SERPL CREATININE-BSD FRML MDRD: ABNORMAL ML/MIN/{1.73_M2}
GLUCOSE BLD-MCNC: 100 MG/DL (ref 70–99)
GLUCOSE BLD-MCNC: 101 MG/DL (ref 70–99)
GLUCOSE BLD-MCNC: 104 MG/DL (ref 70–99)
GLUCOSE BLD-MCNC: 131 MG/DL (ref 70–99)
GLUCOSE BLD-MCNC: 177 MG/DL (ref 70–99)
GLUCOSE BLD-MCNC: 90 MG/DL (ref 70–99)
GLUCOSE URINE: NEGATIVE
HCT VFR BLD CALC: 33.5 % (ref 36.3–47.1)
HCT VFR BLD CALC: 33.8 % (ref 36.3–47.1)
HCT VFR BLD CALC: 35.2 % (ref 36.3–47.1)
HCT VFR BLD CALC: 36.8 % (ref 36.3–47.1)
HCT VFR BLD CALC: 40.5 % (ref 36.3–47.1)
HCT VFR BLD CALC: 43.8 % (ref 36.3–47.1)
HDLC SERPL-MCNC: 63 MG/DL
HEMOGLOBIN: 10.9 G/DL (ref 11.9–15.1)
HEMOGLOBIN: 11 G/DL (ref 11.9–15.1)
HEMOGLOBIN: 11.1 G/DL (ref 11.9–15.1)
HEMOGLOBIN: 12 G/DL (ref 11.9–15.1)
HEMOGLOBIN: 13.5 G/DL (ref 11.9–15.1)
HEMOGLOBIN: 14.2 G/DL (ref 11.9–15.1)
IMMATURE GRANULOCYTES: 0 %
IMMATURE GRANULOCYTES: 1 %
KETONES, URINE: NEGATIVE
LDL CHOLESTEROL: 22 MG/DL (ref 0–130)
LEUKOCYTE ESTERASE, URINE: NEGATIVE
LV EF: 60 %
LVEF MODALITY: NORMAL
LYMPHOCYTES # BLD: 32 % (ref 24–43)
LYMPHOCYTES # BLD: 36 % (ref 24–43)
LYMPHOCYTES # BLD: 36 % (ref 24–43)
LYMPHOCYTES # BLD: 44 % (ref 24–43)
LYMPHOCYTES # BLD: 48 % (ref 24–43)
MCH RBC QN AUTO: 33.1 PG (ref 25.2–33.5)
MCH RBC QN AUTO: 33.9 PG (ref 25.2–33.5)
MCH RBC QN AUTO: 34.1 PG (ref 25.2–33.5)
MCH RBC QN AUTO: 34.2 PG (ref 25.2–33.5)
MCH RBC QN AUTO: 34.5 PG (ref 25.2–33.5)
MCH RBC QN AUTO: 34.6 PG (ref 25.2–33.5)
MCHC RBC AUTO-ENTMCNC: 31.3 G/DL (ref 28.4–34.8)
MCHC RBC AUTO-ENTMCNC: 32.2 G/DL (ref 28.4–34.8)
MCHC RBC AUTO-ENTMCNC: 32.4 G/DL (ref 28.4–34.8)
MCHC RBC AUTO-ENTMCNC: 32.6 G/DL (ref 28.4–34.8)
MCHC RBC AUTO-ENTMCNC: 33.1 G/DL (ref 28.4–34.8)
MCHC RBC AUTO-ENTMCNC: 33.3 G/DL (ref 28.4–34.8)
MCV RBC AUTO: 102.3 FL (ref 82.6–102.9)
MCV RBC AUTO: 104 FL (ref 82.6–102.9)
MCV RBC AUTO: 104 FL (ref 82.6–102.9)
MCV RBC AUTO: 106 FL (ref 82.6–102.9)
MCV RBC AUTO: 106 FL (ref 82.6–102.9)
MCV RBC AUTO: 106.8 FL (ref 82.6–102.9)
MONOCYTES # BLD: 6 % (ref 3–12)
MONOCYTES # BLD: 7 % (ref 3–12)
MONOCYTES # BLD: 7 % (ref 3–12)
MONOCYTES # BLD: 8 % (ref 3–12)
MONOCYTES # BLD: 8 % (ref 3–12)
MUCUS: NORMAL
NITRITE, URINE: NEGATIVE
NRBC AUTOMATED: 0 PER 100 WBC
OTHER OBSERVATIONS UA: NORMAL
PATHOLOGIST: ABNORMAL
PATHOLOGIST: ABNORMAL
PATHOLOGIST: NORMAL
PATHOLOGIST: NORMAL
PDW BLD-RTO: 12 % (ref 11.8–14.4)
PDW BLD-RTO: 12.1 % (ref 11.8–14.4)
PDW BLD-RTO: 12.5 % (ref 11.8–14.4)
PDW BLD-RTO: 12.6 % (ref 11.8–14.4)
PDW BLD-RTO: 12.7 % (ref 11.8–14.4)
PDW BLD-RTO: 12.7 % (ref 11.8–14.4)
PH UA: 7.5 (ref 5–9)
PLATELET # BLD: 149 K/UL (ref 138–453)
PLATELET # BLD: 151 K/UL (ref 138–453)
PLATELET # BLD: 153 K/UL (ref 138–453)
PLATELET # BLD: 153 K/UL (ref 138–453)
PLATELET # BLD: 168 K/UL (ref 138–453)
PLATELET # BLD: 197 K/UL (ref 138–453)
PLATELET ESTIMATE: ABNORMAL
PMV BLD AUTO: 9.1 FL (ref 8.1–13.5)
PMV BLD AUTO: 9.4 FL (ref 8.1–13.5)
PMV BLD AUTO: 9.5 FL (ref 8.1–13.5)
PMV BLD AUTO: 9.6 FL (ref 8.1–13.5)
PMV BLD AUTO: 9.7 FL (ref 8.1–13.5)
PMV BLD AUTO: 9.7 FL (ref 8.1–13.5)
POTASSIUM SERPL-SCNC: 3.9 MMOL/L (ref 3.7–5.3)
POTASSIUM SERPL-SCNC: 4 MMOL/L (ref 3.7–5.3)
POTASSIUM SERPL-SCNC: 4.1 MMOL/L (ref 3.7–5.3)
POTASSIUM SERPL-SCNC: 4.1 MMOL/L (ref 3.7–5.3)
POTASSIUM SERPL-SCNC: 4.2 MMOL/L (ref 3.7–5.3)
POTASSIUM SERPL-SCNC: 4.5 MMOL/L (ref 3.7–5.3)
PRO-BNP: 407 PG/ML
PROTEIN ELECTROPHORESIS, SERUM: ABNORMAL
PROTEIN ELECTROPHORESIS, SERUM: ABNORMAL
PROTEIN UA: NEGATIVE
RBC # BLD: 3.19 M/UL (ref 3.95–5.11)
RBC # BLD: 3.22 M/UL (ref 3.95–5.11)
RBC # BLD: 3.32 M/UL (ref 3.95–5.11)
RBC # BLD: 3.54 M/UL (ref 3.95–5.11)
RBC # BLD: 3.96 M/UL (ref 3.95–5.11)
RBC # BLD: 4.1 M/UL (ref 3.95–5.11)
RBC # BLD: ABNORMAL 10*6/UL
RBC UA: NORMAL /HPF (ref 0–2)
RENAL EPITHELIAL, UA: NORMAL /HPF
SARS-COV-2, POC: NEGATIVE
SEG NEUTROPHILS: 43 % (ref 36–65)
SEG NEUTROPHILS: 45 % (ref 36–65)
SEG NEUTROPHILS: 53 % (ref 36–65)
SEG NEUTROPHILS: 55 % (ref 36–65)
SEG NEUTROPHILS: 59 % (ref 36–65)
SEGMENTED NEUTROPHILS ABSOLUTE COUNT: 1.95 K/UL (ref 1.5–8.1)
SEGMENTED NEUTROPHILS ABSOLUTE COUNT: 2.01 K/UL (ref 1.5–8.1)
SEGMENTED NEUTROPHILS ABSOLUTE COUNT: 2.38 K/UL (ref 1.5–8.1)
SEGMENTED NEUTROPHILS ABSOLUTE COUNT: 2.43 K/UL (ref 1.5–8.1)
SEGMENTED NEUTROPHILS ABSOLUTE COUNT: 2.56 K/UL (ref 1.5–8.1)
SERUM IFX INTERP: NORMAL
SERUM IFX INTERP: NORMAL
SODIUM BLD-SCNC: 136 MMOL/L (ref 135–144)
SODIUM BLD-SCNC: 137 MMOL/L (ref 135–144)
SODIUM BLD-SCNC: 138 MMOL/L (ref 135–144)
SODIUM BLD-SCNC: 139 MMOL/L (ref 135–144)
SODIUM BLD-SCNC: 140 MMOL/L (ref 135–144)
SODIUM BLD-SCNC: 140 MMOL/L (ref 135–144)
SPECIFIC GRAVITY UA: 1.01 (ref 1.01–1.02)
THYROXINE, FREE: 1.01 NG/DL (ref 0.93–1.7)
TOTAL PROT. SUM,%: 100 % (ref 98–102)
TOTAL PROT. SUM,%: 100 % (ref 98–102)
TOTAL PROT. SUM: 7.7 G/DL (ref 6.3–8.2)
TOTAL PROT. SUM: 8.2 G/DL (ref 6.3–8.2)
TOTAL PROTEIN: 7.7 G/DL (ref 6.4–8.3)
TOTAL PROTEIN: 8 G/DL (ref 6.4–8.3)
TOTAL PROTEIN: 8.2 G/DL (ref 6.4–8.3)
TOTAL PROTEIN: 8.2 G/DL (ref 6.4–8.3)
TOTAL PROTEIN: 8.6 G/DL (ref 6.4–8.3)
TOTAL PROTEIN: 8.6 G/DL (ref 6.4–8.3)
TOTAL PROTEIN: 9 G/DL (ref 6.4–8.3)
TRICHOMONAS: NORMAL
TRIGL SERPL-MCNC: 46 MG/DL
TROPONIN INTERP: NORMAL
TROPONIN T: NORMAL NG/ML
TROPONIN, HIGH SENSITIVITY: 11 NG/L (ref 0–14)
TROPONIN, HIGH SENSITIVITY: 7 NG/L (ref 0–14)
TROPONIN, HIGH SENSITIVITY: 9 NG/L (ref 0–14)
TSH SERPL DL<=0.05 MIU/L-ACNC: 1.88 MIU/L (ref 0.3–5)
TURBIDITY: CLEAR
URINE HGB: NEGATIVE
UROBILINOGEN, URINE: NORMAL
VLDLC SERPL CALC-MCNC: NORMAL MG/DL (ref 1–30)
WBC # BLD: 4.3 K/UL (ref 3.5–11.3)
WBC # BLD: 4.3 K/UL (ref 3.5–11.3)
WBC # BLD: 4.4 K/UL (ref 3.5–11.3)
WBC # BLD: 4.4 K/UL (ref 3.5–11.3)
WBC # BLD: 4.7 K/UL (ref 3.5–11.3)
WBC # BLD: 6.3 K/UL (ref 3.5–11.3)
WBC # BLD: ABNORMAL 10*3/UL
WBC UA: NORMAL /HPF (ref 0–5)
YEAST: NORMAL

## 2021-01-01 PROCEDURE — G8400 PT W/DXA NO RESULTS DOC: HCPCS | Performed by: INTERNAL MEDICINE

## 2021-01-01 PROCEDURE — 6370000000 HC RX 637 (ALT 250 FOR IP): Performed by: INTERNAL MEDICINE

## 2021-01-01 PROCEDURE — 3017F COLORECTAL CA SCREEN DOC REV: CPT | Performed by: INTERNAL MEDICINE

## 2021-01-01 PROCEDURE — 99211 OFF/OP EST MAY X REQ PHY/QHP: CPT | Performed by: INTERNAL MEDICINE

## 2021-01-01 PROCEDURE — 1123F ACP DISCUSS/DSCN MKR DOCD: CPT | Performed by: INTERNAL MEDICINE

## 2021-01-01 PROCEDURE — 3017F COLORECTAL CA SCREEN DOC REV: CPT | Performed by: NEUROMUSCULOSKELETAL MEDICINE, SPORTS MEDICINE

## 2021-01-01 PROCEDURE — 80048 BASIC METABOLIC PNL TOTAL CA: CPT

## 2021-01-01 PROCEDURE — 1111F DSCHRG MED/CURRENT MED MERGE: CPT | Performed by: INTERNAL MEDICINE

## 2021-01-01 PROCEDURE — 2580000003 HC RX 258: Performed by: INTERNAL MEDICINE

## 2021-01-01 PROCEDURE — 85025 COMPLETE CBC W/AUTO DIFF WBC: CPT

## 2021-01-01 PROCEDURE — 84484 ASSAY OF TROPONIN QUANT: CPT

## 2021-01-01 PROCEDURE — 93017 CV STRESS TEST TRACING ONLY: CPT

## 2021-01-01 PROCEDURE — 99284 EMERGENCY DEPT VISIT MOD MDM: CPT

## 2021-01-01 PROCEDURE — G8420 CALC BMI NORM PARAMETERS: HCPCS | Performed by: INTERNAL MEDICINE

## 2021-01-01 PROCEDURE — 1090F PRES/ABSN URINE INCON ASSESS: CPT | Performed by: INTERNAL MEDICINE

## 2021-01-01 PROCEDURE — 1090F PRES/ABSN URINE INCON ASSESS: CPT | Performed by: NEUROMUSCULOSKELETAL MEDICINE, SPORTS MEDICINE

## 2021-01-01 PROCEDURE — G8427 DOCREV CUR MEDS BY ELIG CLIN: HCPCS | Performed by: INTERNAL MEDICINE

## 2021-01-01 PROCEDURE — 93242 EXT ECG>48HR<7D RECORDING: CPT

## 2021-01-01 PROCEDURE — G8484 FLU IMMUNIZE NO ADMIN: HCPCS | Performed by: INTERNAL MEDICINE

## 2021-01-01 PROCEDURE — 70496 CT ANGIOGRAPHY HEAD: CPT

## 2021-01-01 PROCEDURE — 4040F PNEUMOC VAC/ADMIN/RCVD: CPT | Performed by: INTERNAL MEDICINE

## 2021-01-01 PROCEDURE — 4004F PT TOBACCO SCREEN RCVD TLK: CPT | Performed by: NEUROMUSCULOSKELETAL MEDICINE, SPORTS MEDICINE

## 2021-01-01 PROCEDURE — 80053 COMPREHEN METABOLIC PANEL: CPT

## 2021-01-01 PROCEDURE — 93243 EXT ECG>48HR<7D SCAN A/R: CPT

## 2021-01-01 PROCEDURE — 70450 CT HEAD/BRAIN W/O DYE: CPT

## 2021-01-01 PROCEDURE — 99213 OFFICE O/P EST LOW 20 MIN: CPT | Performed by: INTERNAL MEDICINE

## 2021-01-01 PROCEDURE — 1123F ACP DISCUSS/DSCN MKR DOCD: CPT | Performed by: NEUROMUSCULOSKELETAL MEDICINE, SPORTS MEDICINE

## 2021-01-01 PROCEDURE — 36415 COLL VENOUS BLD VENIPUNCTURE: CPT

## 2021-01-01 PROCEDURE — 84155 ASSAY OF PROTEIN SERUM: CPT

## 2021-01-01 PROCEDURE — 1036F TOBACCO NON-USER: CPT | Performed by: INTERNAL MEDICINE

## 2021-01-01 PROCEDURE — 6360000004 HC RX CONTRAST MEDICATION: Performed by: PHYSICIAN ASSISTANT

## 2021-01-01 PROCEDURE — 84165 PROTEIN E-PHORESIS SERUM: CPT

## 2021-01-01 PROCEDURE — 83880 ASSAY OF NATRIURETIC PEPTIDE: CPT

## 2021-01-01 PROCEDURE — 99214 OFFICE O/P EST MOD 30 MIN: CPT | Performed by: INTERNAL MEDICINE

## 2021-01-01 PROCEDURE — 94761 N-INVAS EAR/PLS OXIMETRY MLT: CPT

## 2021-01-01 PROCEDURE — 6360000002 HC RX W HCPCS: Performed by: INTERNAL MEDICINE

## 2021-01-01 PROCEDURE — 84439 ASSAY OF FREE THYROXINE: CPT

## 2021-01-01 PROCEDURE — 99214 OFFICE O/P EST MOD 30 MIN: CPT | Performed by: NEUROMUSCULOSKELETAL MEDICINE, SPORTS MEDICINE

## 2021-01-01 PROCEDURE — 2580000003 HC RX 258: Performed by: EMERGENCY MEDICINE

## 2021-01-01 PROCEDURE — 80061 LIPID PANEL: CPT

## 2021-01-01 PROCEDURE — 93005 ELECTROCARDIOGRAM TRACING: CPT | Performed by: INTERNAL MEDICINE

## 2021-01-01 PROCEDURE — 93922 UPR/L XTREMITY ART 2 LEVELS: CPT

## 2021-01-01 PROCEDURE — 99212 OFFICE O/P EST SF 10 MIN: CPT | Performed by: NEUROMUSCULOSKELETAL MEDICINE, SPORTS MEDICINE

## 2021-01-01 PROCEDURE — 70553 MRI BRAIN STEM W/O & W/DYE: CPT

## 2021-01-01 PROCEDURE — 86334 IMMUNOFIX E-PHORESIS SERUM: CPT

## 2021-01-01 PROCEDURE — 71045 X-RAY EXAM CHEST 1 VIEW: CPT

## 2021-01-01 PROCEDURE — 6370000000 HC RX 637 (ALT 250 FOR IP): Performed by: PHYSICIAN ASSISTANT

## 2021-01-01 PROCEDURE — A9579 GAD-BASE MR CONTRAST NOS,1ML: HCPCS | Performed by: INTERNAL MEDICINE

## 2021-01-01 PROCEDURE — A9500 TC99M SESTAMIBI: HCPCS | Performed by: INTERNAL MEDICINE

## 2021-01-01 PROCEDURE — 71046 X-RAY EXAM CHEST 2 VIEWS: CPT

## 2021-01-01 PROCEDURE — G8420 CALC BMI NORM PARAMETERS: HCPCS | Performed by: NEUROMUSCULOSKELETAL MEDICINE, SPORTS MEDICINE

## 2021-01-01 PROCEDURE — 4040F PNEUMOC VAC/ADMIN/RCVD: CPT | Performed by: NEUROMUSCULOSKELETAL MEDICINE, SPORTS MEDICINE

## 2021-01-01 PROCEDURE — 93010 ELECTROCARDIOGRAM REPORT: CPT | Performed by: INTERNAL MEDICINE

## 2021-01-01 PROCEDURE — 93010 ELECTROCARDIOGRAM REPORT: CPT | Performed by: FAMILY MEDICINE

## 2021-01-01 PROCEDURE — 93880 EXTRACRANIAL BILAT STUDY: CPT

## 2021-01-01 PROCEDURE — 1200000000 HC SEMI PRIVATE

## 2021-01-01 PROCEDURE — 78452 HT MUSCLE IMAGE SPECT MULT: CPT

## 2021-01-01 PROCEDURE — 93005 ELECTROCARDIOGRAM TRACING: CPT | Performed by: EMERGENCY MEDICINE

## 2021-01-01 PROCEDURE — 99215 OFFICE O/P EST HI 40 MIN: CPT | Performed by: INTERNAL MEDICINE

## 2021-01-01 PROCEDURE — 81001 URINALYSIS AUTO W/SCOPE: CPT

## 2021-01-01 PROCEDURE — 84443 ASSAY THYROID STIM HORMONE: CPT

## 2021-01-01 PROCEDURE — 2580000003 HC RX 258: Performed by: PHYSICIAN ASSISTANT

## 2021-01-01 PROCEDURE — 77075 RADEX OSSEOUS SURVEY COMPL: CPT

## 2021-01-01 PROCEDURE — 93660 TILT TABLE EVALUATION: CPT

## 2021-01-01 PROCEDURE — 3430000000 HC RX DIAGNOSTIC RADIOPHARMACEUTICAL: Performed by: INTERNAL MEDICINE

## 2021-01-01 PROCEDURE — 99204 OFFICE O/P NEW MOD 45 MIN: CPT | Performed by: INTERNAL MEDICINE

## 2021-01-01 PROCEDURE — 93306 TTE W/DOPPLER COMPLETE: CPT

## 2021-01-01 PROCEDURE — 85027 COMPLETE CBC AUTOMATED: CPT

## 2021-01-01 PROCEDURE — 4004F PT TOBACCO SCREEN RCVD TLK: CPT | Performed by: INTERNAL MEDICINE

## 2021-01-01 PROCEDURE — G8427 DOCREV CUR MEDS BY ELIG CLIN: HCPCS | Performed by: NEUROMUSCULOSKELETAL MEDICINE, SPORTS MEDICINE

## 2021-01-01 PROCEDURE — 2000000000 HC ICU R&B

## 2021-01-01 PROCEDURE — 2500000003 HC RX 250 WO HCPCS: Performed by: PHYSICIAN ASSISTANT

## 2021-01-01 PROCEDURE — 6360000004 HC RX CONTRAST MEDICATION: Performed by: INTERNAL MEDICINE

## 2021-01-01 PROCEDURE — 99211 OFF/OP EST MAY X REQ PHY/QHP: CPT

## 2021-01-01 PROCEDURE — 93005 ELECTROCARDIOGRAM TRACING: CPT | Performed by: FAMILY MEDICINE

## 2021-01-01 PROCEDURE — 99282 EMERGENCY DEPT VISIT SF MDM: CPT

## 2021-01-01 PROCEDURE — G8400 PT W/DXA NO RESULTS DOC: HCPCS | Performed by: NEUROMUSCULOSKELETAL MEDICINE, SPORTS MEDICINE

## 2021-01-01 RX ORDER — NIFEDIPINE 30 MG/1
60 TABLET, FILM COATED, EXTENDED RELEASE ORAL ONCE
Status: COMPLETED | OUTPATIENT
Start: 2021-01-01 | End: 2021-01-01

## 2021-01-01 RX ORDER — VITAMIN B COMPLEX
2000 TABLET ORAL DAILY
Status: DISCONTINUED | OUTPATIENT
Start: 2021-01-01 | End: 2021-01-01 | Stop reason: HOSPADM

## 2021-01-01 RX ORDER — AMLODIPINE BESYLATE 5 MG/1
10 TABLET ORAL ONCE
Status: COMPLETED | OUTPATIENT
Start: 2021-01-01 | End: 2021-01-01

## 2021-01-01 RX ORDER — AMLODIPINE BESYLATE 10 MG/1
10 TABLET ORAL DAILY
Status: ON HOLD | COMMUNITY
End: 2021-01-01 | Stop reason: HOSPADM

## 2021-01-01 RX ORDER — VITAMIN B COMPLEX
1 CAPSULE ORAL DAILY
COMMUNITY

## 2021-01-01 RX ORDER — ATORVASTATIN CALCIUM 10 MG/1
10 TABLET, FILM COATED ORAL DAILY
Status: DISCONTINUED | OUTPATIENT
Start: 2021-01-01 | End: 2021-01-01 | Stop reason: HOSPADM

## 2021-01-01 RX ORDER — NIFEDIPINE 30 MG/1
60 TABLET, FILM COATED, EXTENDED RELEASE ORAL DAILY
Status: DISCONTINUED | OUTPATIENT
Start: 2021-01-01 | End: 2021-01-01

## 2021-01-01 RX ORDER — CLONIDINE 0.2 MG/24H
1 PATCH, EXTENDED RELEASE TRANSDERMAL WEEKLY
Status: DISCONTINUED | OUTPATIENT
Start: 2021-01-01 | End: 2021-01-01 | Stop reason: SDUPTHER

## 2021-01-01 RX ORDER — DILTIAZEM HYDROCHLORIDE 180 MG/1
180 CAPSULE, COATED, EXTENDED RELEASE ORAL DAILY
COMMUNITY
End: 2021-01-01

## 2021-01-01 RX ORDER — LOSARTAN POTASSIUM 100 MG/1
100 TABLET ORAL DAILY
Qty: 90 TABLET | Refills: 3 | OUTPATIENT
Start: 2021-01-01 | End: 2021-01-01

## 2021-01-01 RX ORDER — OXYBUTYNIN CHLORIDE 5 MG/1
5 TABLET ORAL 3 TIMES DAILY
Status: DISCONTINUED | OUTPATIENT
Start: 2021-01-01 | End: 2021-01-01 | Stop reason: HOSPADM

## 2021-01-01 RX ORDER — POLYETHYLENE GLYCOL 3350 17 G/17G
17 POWDER, FOR SOLUTION ORAL DAILY PRN
Status: DISCONTINUED | OUTPATIENT
Start: 2021-01-01 | End: 2021-01-01 | Stop reason: HOSPADM

## 2021-01-01 RX ORDER — LOSARTAN POTASSIUM AND HYDROCHLOROTHIAZIDE 12.5; 1 MG/1; MG/1
1 TABLET ORAL ONCE
Status: COMPLETED | OUTPATIENT
Start: 2021-01-01 | End: 2021-01-01

## 2021-01-01 RX ORDER — AMLODIPINE BESYLATE 10 MG/1
10 TABLET ORAL DAILY
Qty: 30 TABLET | Refills: 0 | Status: SHIPPED
Start: 2021-01-01 | End: 2021-01-01 | Stop reason: HOSPADM

## 2021-01-01 RX ORDER — AMLODIPINE BESYLATE 10 MG/1
10 TABLET ORAL DAILY
Status: DISCONTINUED | OUTPATIENT
Start: 2021-01-01 | End: 2021-01-01

## 2021-01-01 RX ORDER — SODIUM CHLORIDE 0.9 % (FLUSH) 0.9 %
10 SYRINGE (ML) INJECTION PRN
Status: DISCONTINUED | OUTPATIENT
Start: 2021-01-01 | End: 2021-01-01 | Stop reason: HOSPADM

## 2021-01-01 RX ORDER — ATORVASTATIN CALCIUM 10 MG/1
10 TABLET, FILM COATED ORAL DAILY
Qty: 90 TABLET | Refills: 3 | Status: SHIPPED | OUTPATIENT
Start: 2021-01-01

## 2021-01-01 RX ORDER — TOPIRAMATE 25 MG/1
50 TABLET ORAL NIGHTLY
Status: DISCONTINUED | OUTPATIENT
Start: 2021-01-01 | End: 2021-01-01 | Stop reason: HOSPADM

## 2021-01-01 RX ORDER — CELECOXIB 200 MG/1
200 CAPSULE ORAL 2 TIMES DAILY
Status: DISCONTINUED | OUTPATIENT
Start: 2021-01-01 | End: 2021-01-01 | Stop reason: HOSPADM

## 2021-01-01 RX ORDER — CALCIUM CARBONATE 200(500)MG
500 TABLET,CHEWABLE ORAL DAILY
Status: DISCONTINUED | OUTPATIENT
Start: 2021-01-01 | End: 2021-01-01 | Stop reason: HOSPADM

## 2021-01-01 RX ORDER — LOSARTAN POTASSIUM AND HYDROCHLOROTHIAZIDE 12.5; 1 MG/1; MG/1
1 TABLET ORAL DAILY
Qty: 90 TABLET | Refills: 0 | Status: SHIPPED | OUTPATIENT
Start: 2021-01-01 | End: 2021-01-01

## 2021-01-01 RX ORDER — PAROXETINE HYDROCHLORIDE 20 MG/1
20 TABLET, FILM COATED ORAL EVERY MORNING
Status: DISCONTINUED | OUTPATIENT
Start: 2021-01-01 | End: 2021-01-01 | Stop reason: HOSPADM

## 2021-01-01 RX ORDER — LOSARTAN POTASSIUM AND HYDROCHLOROTHIAZIDE 12.5; 1 MG/1; MG/1
1 TABLET ORAL DAILY
Status: DISCONTINUED | OUTPATIENT
Start: 2021-01-01 | End: 2021-01-01 | Stop reason: HOSPADM

## 2021-01-01 RX ORDER — HYDRALAZINE HYDROCHLORIDE 50 MG/1
100 TABLET, FILM COATED ORAL 3 TIMES DAILY
Status: DISCONTINUED | OUTPATIENT
Start: 2021-01-01 | End: 2021-01-01

## 2021-01-01 RX ORDER — LOSARTAN POTASSIUM AND HYDROCHLOROTHIAZIDE 12.5; 1 MG/1; MG/1
1 TABLET ORAL DAILY
Status: ON HOLD | COMMUNITY
End: 2021-01-01 | Stop reason: HOSPADM

## 2021-01-01 RX ORDER — ACETAMINOPHEN 325 MG/1
650 TABLET ORAL EVERY 6 HOURS PRN
Status: DISCONTINUED | OUTPATIENT
Start: 2021-01-01 | End: 2021-01-01 | Stop reason: HOSPADM

## 2021-01-01 RX ORDER — TIZANIDINE 4 MG/1
4 TABLET ORAL 3 TIMES DAILY
Status: DISCONTINUED | OUTPATIENT
Start: 2021-01-01 | End: 2021-01-01 | Stop reason: HOSPADM

## 2021-01-01 RX ORDER — SODIUM CHLORIDE 0.9 % (FLUSH) 0.9 %
5-40 SYRINGE (ML) INJECTION EVERY 12 HOURS SCHEDULED
Status: DISCONTINUED | OUTPATIENT
Start: 2021-01-01 | End: 2021-01-01 | Stop reason: HOSPADM

## 2021-01-01 RX ORDER — TOPIRAMATE 25 MG/1
TABLET ORAL
Qty: 180 TABLET | Refills: 1 | Status: SHIPPED | OUTPATIENT
Start: 2021-01-01 | End: 2022-01-01

## 2021-01-01 RX ORDER — ASPIRIN 81 MG/1
81 TABLET ORAL DAILY
COMMUNITY

## 2021-01-01 RX ORDER — ONDANSETRON 2 MG/ML
4 INJECTION INTRAMUSCULAR; INTRAVENOUS EVERY 6 HOURS PRN
Status: DISCONTINUED | OUTPATIENT
Start: 2021-01-01 | End: 2021-01-01 | Stop reason: HOSPADM

## 2021-01-01 RX ORDER — NIFEDIPINE 30 MG/1
60 TABLET, FILM COATED, EXTENDED RELEASE ORAL DAILY
Status: DISCONTINUED | OUTPATIENT
Start: 2021-01-01 | End: 2021-01-01 | Stop reason: HOSPADM

## 2021-01-01 RX ORDER — ONDANSETRON 4 MG/1
4 TABLET, ORALLY DISINTEGRATING ORAL EVERY 8 HOURS PRN
Status: DISCONTINUED | OUTPATIENT
Start: 2021-01-01 | End: 2021-01-01 | Stop reason: HOSPADM

## 2021-01-01 RX ORDER — CLOPIDOGREL BISULFATE 75 MG/1
75 TABLET ORAL DAILY
Qty: 90 TABLET | Refills: 3 | Status: SHIPPED | OUTPATIENT
Start: 2021-01-01 | End: 2022-01-01

## 2021-01-01 RX ORDER — NIFEDIPINE 60 MG/1
60 TABLET, FILM COATED, EXTENDED RELEASE ORAL DAILY
Qty: 30 TABLET | Refills: 3 | OUTPATIENT
Start: 2021-01-01 | End: 2021-01-01

## 2021-01-01 RX ORDER — CLONIDINE 0.2 MG/24H
1 PATCH, EXTENDED RELEASE TRANSDERMAL WEEKLY
Status: DISCONTINUED | OUTPATIENT
Start: 2021-01-01 | End: 2021-01-01

## 2021-01-01 RX ADMIN — CELECOXIB 200 MG: 200 CAPSULE ORAL at 07:59

## 2021-01-01 RX ADMIN — ENOXAPARIN SODIUM 40 MG: 40 INJECTION SUBCUTANEOUS at 07:59

## 2021-01-01 RX ADMIN — SODIUM CHLORIDE, PRESERVATIVE FREE 10 ML: 5 INJECTION INTRAVENOUS at 08:28

## 2021-01-01 RX ADMIN — NIFEDIPINE 60 MG: 30 TABLET, EXTENDED RELEASE ORAL at 07:59

## 2021-01-01 RX ADMIN — NIFEDIPINE 60 MG: 30 TABLET, EXTENDED RELEASE ORAL at 17:43

## 2021-01-01 RX ADMIN — IOPAMIDOL 75 ML: 755 INJECTION, SOLUTION INTRAVENOUS at 12:18

## 2021-01-01 RX ADMIN — PAROXETINE HYDROCHLORIDE 20 MG: 20 TABLET, FILM COATED ORAL at 07:59

## 2021-01-01 RX ADMIN — ATORVASTATIN CALCIUM 10 MG: 10 TABLET, FILM COATED ORAL at 17:45

## 2021-01-01 RX ADMIN — TIZANIDINE 4 MG: 4 TABLET ORAL at 08:00

## 2021-01-01 RX ADMIN — OXYBUTYNIN CHLORIDE 5 MG: 5 TABLET ORAL at 20:36

## 2021-01-01 RX ADMIN — DEXTROSE MONOHYDRATE 3 MG/HR: 50 INJECTION, SOLUTION INTRAVENOUS at 15:24

## 2021-01-01 RX ADMIN — ENOXAPARIN SODIUM 40 MG: 40 INJECTION SUBCUTANEOUS at 08:28

## 2021-01-01 RX ADMIN — SODIUM CHLORIDE 1000 ML: 9 INJECTION, SOLUTION INTRAVENOUS at 13:15

## 2021-01-01 RX ADMIN — CELECOXIB 200 MG: 200 CAPSULE ORAL at 20:17

## 2021-01-01 RX ADMIN — AMLODIPINE BESYLATE 10 MG: 5 TABLET ORAL at 13:29

## 2021-01-01 RX ADMIN — TOPIRAMATE 50 MG: 25 TABLET, FILM COATED ORAL at 20:16

## 2021-01-01 RX ADMIN — Medication 2000 UNITS: at 08:31

## 2021-01-01 RX ADMIN — TIZANIDINE 4 MG: 4 TABLET ORAL at 20:16

## 2021-01-01 RX ADMIN — GADOTERIDOL 12 ML: 279.3 INJECTION, SOLUTION INTRAVENOUS at 07:45

## 2021-01-01 RX ADMIN — Medication 10 MILLICURIE: at 11:54

## 2021-01-01 RX ADMIN — OXYBUTYNIN CHLORIDE 5 MG: 5 TABLET ORAL at 08:31

## 2021-01-01 RX ADMIN — LOSARTAN POTASSIUM AND HYDROCHLOROTHIAZIDE 1 TABLET: 12.5; 1 TABLET ORAL at 13:29

## 2021-01-01 RX ADMIN — TIZANIDINE 4 MG: 4 TABLET ORAL at 08:31

## 2021-01-01 RX ADMIN — TIZANIDINE 4 MG: 4 TABLET ORAL at 13:29

## 2021-01-01 RX ADMIN — TIZANIDINE 4 MG: 4 TABLET ORAL at 20:36

## 2021-01-01 RX ADMIN — SODIUM CHLORIDE, PRESERVATIVE FREE 10 ML: 5 INJECTION INTRAVENOUS at 20:36

## 2021-01-01 RX ADMIN — ATORVASTATIN CALCIUM 10 MG: 10 TABLET, FILM COATED ORAL at 08:00

## 2021-01-01 RX ADMIN — PAROXETINE HYDROCHLORIDE 20 MG: 20 TABLET, FILM COATED ORAL at 08:31

## 2021-01-01 RX ADMIN — Medication 30 MILLICURIE: at 11:54

## 2021-01-01 RX ADMIN — OXYBUTYNIN CHLORIDE 5 MG: 5 TABLET ORAL at 20:16

## 2021-01-01 RX ADMIN — NIFEDIPINE 60 MG: 30 TABLET, EXTENDED RELEASE ORAL at 10:04

## 2021-01-01 RX ADMIN — TOPIRAMATE 50 MG: 25 TABLET, FILM COATED ORAL at 20:36

## 2021-01-01 RX ADMIN — ATORVASTATIN CALCIUM 10 MG: 10 TABLET, FILM COATED ORAL at 08:31

## 2021-01-01 RX ADMIN — ACETAMINOPHEN 650 MG: 325 TABLET ORAL at 06:45

## 2021-01-01 RX ADMIN — OXYBUTYNIN CHLORIDE 5 MG: 5 TABLET ORAL at 13:29

## 2021-01-01 RX ADMIN — SODIUM CHLORIDE, PRESERVATIVE FREE 10 ML: 5 INJECTION INTRAVENOUS at 20:17

## 2021-01-01 RX ADMIN — OXYBUTYNIN CHLORIDE 5 MG: 5 TABLET ORAL at 07:59

## 2021-01-01 RX ADMIN — LOSARTAN POTASSIUM AND HYDROCHLOROTHIAZIDE 1 TABLET: 12.5; 1 TABLET ORAL at 07:59

## 2021-01-01 RX ADMIN — CELECOXIB 200 MG: 200 CAPSULE ORAL at 08:31

## 2021-01-01 RX ADMIN — Medication 2000 UNITS: at 08:00

## 2021-01-01 RX ADMIN — SODIUM CHLORIDE 1000 ML: 9 INJECTION, SOLUTION INTRAVENOUS at 14:45

## 2021-01-01 RX ADMIN — CELECOXIB 200 MG: 200 CAPSULE ORAL at 20:36

## 2021-01-01 RX ADMIN — SODIUM CHLORIDE, PRESERVATIVE FREE 5 ML: 5 INJECTION INTRAVENOUS at 08:04

## 2021-01-01 RX ADMIN — LOSARTAN POTASSIUM AND HYDROCHLOROTHIAZIDE 1 TABLET: 12.5; 1 TABLET ORAL at 08:31

## 2021-01-01 RX ADMIN — ENOXAPARIN SODIUM 40 MG: 40 INJECTION SUBCUTANEOUS at 17:45

## 2021-01-01 ASSESSMENT — ENCOUNTER SYMPTOMS
SORE THROAT: 0
CHEST TIGHTNESS: 0
DIARRHEA: 0
SHORTNESS OF BREATH: 0
COUGH: 0
VOMITING: 0
CONSTIPATION: 0
EYE DISCHARGE: 0
SHORTNESS OF BREATH: 0
DIARRHEA: 0
NAUSEA: 0
VOMITING: 0
NAUSEA: 0
ABDOMINAL PAIN: 0
CONSTIPATION: 0
ABDOMINAL DISTENTION: 0
WHEEZING: 0
ABDOMINAL PAIN: 0
EYE REDNESS: 0
WHEEZING: 0
BLOOD IN STOOL: 0
BACK PAIN: 0
COUGH: 0
BACK PAIN: 0
RHINORRHEA: 0

## 2021-01-01 ASSESSMENT — PAIN SCALES - GENERAL
PAINLEVEL_OUTOF10: 0
PAINLEVEL_OUTOF10: 5
PAINLEVEL_OUTOF10: 6
PAINLEVEL_OUTOF10: 0
PAINLEVEL_OUTOF10: 0
PAINLEVEL_OUTOF10: 7
PAINLEVEL_OUTOF10: 0

## 2021-01-01 ASSESSMENT — PAIN DESCRIPTION - PAIN TYPE
TYPE: ACUTE PAIN

## 2021-01-01 ASSESSMENT — PAIN DESCRIPTION - LOCATION
LOCATION: HEAD

## 2021-01-04 ENCOUNTER — OFFICE VISIT (OUTPATIENT)
Dept: NEUROLOGY | Age: 71
End: 2021-01-04
Payer: MEDICARE

## 2021-01-04 VITALS
RESPIRATION RATE: 16 BRPM | TEMPERATURE: 97.8 F | HEIGHT: 66 IN | WEIGHT: 140.9 LBS | BODY MASS INDEX: 22.64 KG/M2 | DIASTOLIC BLOOD PRESSURE: 72 MMHG | SYSTOLIC BLOOD PRESSURE: 168 MMHG | HEART RATE: 63 BPM

## 2021-01-04 DIAGNOSIS — G43.109 MIGRAINE WITH AURA AND WITHOUT STATUS MIGRAINOSUS, NOT INTRACTABLE: Primary | ICD-10-CM

## 2021-01-04 PROCEDURE — G8400 PT W/DXA NO RESULTS DOC: HCPCS | Performed by: NEUROMUSCULOSKELETAL MEDICINE, SPORTS MEDICINE

## 2021-01-04 PROCEDURE — 99212 OFFICE O/P EST SF 10 MIN: CPT | Performed by: NEUROMUSCULOSKELETAL MEDICINE, SPORTS MEDICINE

## 2021-01-04 PROCEDURE — G8427 DOCREV CUR MEDS BY ELIG CLIN: HCPCS | Performed by: NEUROMUSCULOSKELETAL MEDICINE, SPORTS MEDICINE

## 2021-01-04 PROCEDURE — 4040F PNEUMOC VAC/ADMIN/RCVD: CPT | Performed by: NEUROMUSCULOSKELETAL MEDICINE, SPORTS MEDICINE

## 2021-01-04 PROCEDURE — 1090F PRES/ABSN URINE INCON ASSESS: CPT | Performed by: NEUROMUSCULOSKELETAL MEDICINE, SPORTS MEDICINE

## 2021-01-04 PROCEDURE — 1123F ACP DISCUSS/DSCN MKR DOCD: CPT | Performed by: NEUROMUSCULOSKELETAL MEDICINE, SPORTS MEDICINE

## 2021-01-04 PROCEDURE — 4004F PT TOBACCO SCREEN RCVD TLK: CPT | Performed by: NEUROMUSCULOSKELETAL MEDICINE, SPORTS MEDICINE

## 2021-01-04 PROCEDURE — 99213 OFFICE O/P EST LOW 20 MIN: CPT

## 2021-01-04 PROCEDURE — G8484 FLU IMMUNIZE NO ADMIN: HCPCS | Performed by: NEUROMUSCULOSKELETAL MEDICINE, SPORTS MEDICINE

## 2021-01-04 PROCEDURE — 3017F COLORECTAL CA SCREEN DOC REV: CPT | Performed by: NEUROMUSCULOSKELETAL MEDICINE, SPORTS MEDICINE

## 2021-01-04 PROCEDURE — G8420 CALC BMI NORM PARAMETERS: HCPCS | Performed by: NEUROMUSCULOSKELETAL MEDICINE, SPORTS MEDICINE

## 2021-01-04 RX ORDER — TIZANIDINE 4 MG/1
4 TABLET ORAL 3 TIMES DAILY
COMMUNITY

## 2021-01-04 RX ORDER — CELECOXIB 200 MG/1
200 CAPSULE ORAL 2 TIMES DAILY
COMMUNITY

## 2021-01-04 NOTE — PROGRESS NOTES
NEUROLOGY Follow-up. Patient Name:  Adeline Mandel  :   1950  Clinic Visit Date: 2021    I saw Ms. Adeline Mandel  in the neurology clinic today for migraine headaches. \"Doing very well \"on Topamax 50 mg/day. No new neurological symptoms. She experiences an occasional headache, without other associated significant symptoms. No side effect with Topamax. No new neurological symptoms reported  at this time. She complains of intermittent blurred vision which I believe is secondary to cataracts, and the scheduled to have surgery for this soon. REVIEW OF SYSTEMS    Constitutional Weight changes: present, change in appetite: absent Fatigue: absent; Fevers : absent, Any recent hospitalizations:  absent   HEENT Ears: normal,  Visual disturbance: absent   Respiratory Shortness of breath: absent, choking:  absent, Cough: absent, Snoring : present   Cardiovascular Chest pain: present, Leg swelling :absent, palpitations : present, fainting : absent   GI Constipation: absent, Diarrhea: absent, Swallowing change: absent    Urinary frequency: absent, Urinary urgency: absent, Urinary incontinence: absent   Musculoskeletal Neck pain: present, Back pain: present, Stiffness: absent, Muscle pain: absent, Joint pain: absent, restless leg : absent   Dermatological Hair loss: absent, Skin changes: absent   Neurological Confusion: absent, Trouble concentrating: absent, Seizures: absent;  Memory loss: absent, balance problem: absent, Dizziness: absent, vertigo: absent, Weakness: absent, Numbness absent, Tremor: absent, Spasm: absent, involuntary movement: absent, Speech difficulty: absent, Headache: present, Light sensitivity: absent   Psychiatric Anxiety: absent, Depression  absent, drug abuse: absent, Hallucination: absent, mood disorder: absent, Suicidal ideations absent   Hematologic Abnormal bleeding: absent, Anemia: absent, Lymph gland changes: absent Clotting disorder: absent     Past Medical History: Diagnosis Date    Abnormal patient-activated cardiac event monitor 09/06/2017    sinus tachycardia with PSVT lasting 36 sec @ 165 bpm.    Cataract     H/O echocardiogram 08/15/2017    EF 60%. Mildly increased LV wall thickness normal LV cavity size. No definite specific wall motion abnormalities were identified. No significant valvular abnormalities Mild diastolic dysfunction is seen.     Hypertension     Primary cancer of bone marrow [facility])        Past Surgical History:   Procedure Laterality Date    CARDIAC CATHETERIZATION Left 08/16/2017    left radial/[facility]/Dr Garcia    HYSTERECTOMY  1996    UPPER GASTROINTESTINAL ENDOSCOPY N/A 7/21/2018    EGD BIOPSY performed by Tim Lo MD at [address] [facility] Marital status:      Spouse name: Not on file    Number of children: Not on file    Years of education: Not on file    Highest education level: Not on file   Occupational History    Not on file   Social Needs    Financial resource strain: Not on file    Food insecurity     Worry: Not on file     Inability: Not on file    Transportation needs     Medical: Not on file     Non-medical: Not on file   Tobacco Use    Smoking status: Current Every Day Smoker     Packs/day: 0.50     Years: 6.00     Pack years: 3.00     Types: Cigarettes    Smokeless tobacco: Never Used   Substance and Sexual Activity    Alcohol use: Yes     Comment: daily    Drug use: No    Sexual activity: Not on file   Lifestyle    Physical activity     Days per week: Not on file     Minutes per session: Not on file    Stress: Not on file   Relationships    Social connections     Talks on phone: Not on file     Gets together: Not on file     Attends Moravian service: Not on file     Active member of club or organization: Not on file     Attends meetings of clubs or organizations: Not on file     Relationship status: Not on file    Intimate partner violence     Fear of current or ex partner: Not on file     Emotionally abused: Not on file     Physically abused: Not on file     Forced sexual activity: Not on file   Other Topics Concern    Not on file   Social History Narrative    Not on file       Family History   Problem Relation Age of Onset    Lupus Mother     Cancer Father         leukemia    Brain Cancer Sister        Current Outpatient Medications   Medication Sig Dispense Refill    celecoxib (CELEBREX) 200 MG capsule Take 200 mg by mouth 2 times daily      tiZANidine (ZANAFLEX) 4 MG tablet Take 4 mg by mouth 3 times daily      topiramate (TOPAMAX) 25 MG tablet Take 2 tablets by mouth daily 180 tablet 1    vitamin B-12 (CYANOCOBALAMIN) 1000 MCG tablet Take 1,000 mcg by mouth daily      atorvastatin (LIPITOR) 10 MG tablet Take 1 tablet by mouth daily 90 tablet 3    valACYclovir (VALTREX) 500 MG tablet Take 1,000 mg by mouth 2 times daily as needed       Cholecalciferol (VITAMIN D) 2000 units CAPS capsule Take by mouth daily       oxybutynin (DITROPAN) 5 MG tablet Take 5 mg by mouth 3 times daily      calcium carbonate (OSCAL) 500 MG TABS tablet Take 1,200 mg by mouth daily      Multiple Vitamins-Minerals (THERAPEUTIC MULTIVITAMIN-MINERALS) tablet Take 1 tablet by mouth daily      PARoxetine (PAXIL) 20 MG tablet Take 20 mg by mouth every morning       No current facility-administered medications for this visit.               DATA:  Lab Results   Component Value Date    WBC 6.4 07/13/2020    HGB 13.0 07/13/2020     07/13/2020    CHOL 102 07/13/2020    TRIG 65 07/13/2020    HDL 60 07/13/2020    ALT 11 10/22/2018    AST 17 10/22/2018     07/13/2020    K 4.1 07/13/2020     07/13/2020    CREATININE 0.67 07/13/2020    BUN 13 07/13/2020    CO2 24 07/13/2020    TSH 1.87 08/15/2017    LABA1C 5.0 08/16/2017       BP (!) 168/72 (Site: Left Upper Arm, Position: Sitting, Cuff Size: Medium Adult)   Pulse 63   Temp 97.8 °F (36.6 °C) (Tympanic)   Resp 16 Ht 5' 5.5\" (1.664 m)   Wt 140 lb 14.4 oz (63.9 kg)   BMI 23.09 kg/m²     NEUROLOGICAL EXAMINATION:     MENTAL STATUS: Patient is alert and oriented. There is no confusion or aphasia. Memory is normal.     CRANIAL NERVES: III-XII are normal.    MOTOR EXAMINATION: Muscle tone is normal in all the limbs. There is no focal weakness. Muscle strength is 5/5 in both upper and lower limbs. There are no abnormal limb movements. SENSORY EXAMINATION: Normal.     STRETCH REFLEXES: 1+ and symmetrical in both the upper and lower limbs. GAIT: There is no ataxia. IMPRESSION: Migraine headaches. Doing well on Topamax [25 mg total] 2 tablets a day. Normal nonfocal neurological emanation. PLAN:    1. Continue Topamax 50 mg/day  2. Follow-up in 6 months or earlier if needed      NOTE: This neurology evaluation is part of outpatient coverage at [facility]  1-2 days per week. Patients requiring frequent evaluations or uncomfortable with potential 3-4 day turnaround on questions or calls  may be better served by a neurologist in the area full time. [facility] at [facility]. Pavel/Ifrah is available for outpatient visits and procedures including EMG/NCS. Non-[facility] neurologists also practice in [facility] (Dr. Lola Valle) and [facility] (Jane Brcie).        Zeynep Riley MD   1/4/2021  9:45 AM        CC: Sarah Delgado MD

## 2021-01-04 NOTE — PATIENT INSTRUCTIONS
SURVEY:    You may be receiving a survey from [employer] regarding your visit today. Please complete the survey to enable us to provide the highest quality of care to you and your family. If you cannot score us a very good on any question, please call the office to discuss how we could have made your experience a very good one. Thank you. Patient Education        Learning About Coronavirus [phone])  Coronavirus [phone]): Overview  What is coronavirus ([id number])? The coronavirus disease (COVID-19) is caused by a virus. It is an illness that was first found in December 2019. It has since spread worldwide. The virus can cause fever, cough, and trouble breathing. In severe cases, it can cause pneumonia and make it hard to breathe without help. It can cause death. This virus spreads person-to-person through droplets from coughing and sneezing. It can also spread when you are close to someone who is infected. And it can spread when you touch something that has the virus on it, such as a doorknob or a tabletop. Coronaviruses are a large group of viruses. They cause the common cold. They also cause more serious illnesses like Middle East respiratory syndrome (MERS) and severe acute respiratory syndrome (SARS). COVID-19 is caused by a novel coronavirus. That means it's a new type that has not been seen in people before. How is COVID-19 treated? Mild illness can be treated at home, but more serious illness needs to be treated in the hospital. Treatment may include medicines to reduce symptoms, plus breathing support such as oxygen therapy or a ventilator. Other treatments, such as antiviral medicines, may help people who have COVID-19. What can you do to protect yourself from COVID-19? The best way to protect yourself from getting sick is to:  · Avoid areas where there is an outbreak. · Avoid contact with people who may be infected.   · Avoid crowds and try to stay at least 6 feet away from other people. · Wash your hands often, especially after you cough or sneeze. Use soap and water, and scrub for at least 20 seconds. If soap and water aren't available, use an alcohol-based hand . · Avoid touching your mouth, nose, and eyes. What can you do to avoid spreading the virus to others? To help avoid spreading the virus to others:  · Wash your hands often with soap or alcohol-based hand sanitizers. · Cover your mouth with a tissue when you cough or sneeze. Then throw the tissue in the trash. · Use a disinfectant to clean things that you touch often. These include doorknobs, remote controls, phones, and handles on your refrigerator and microwave. And don't forget countertops, tabletops, bathrooms, and computer keyboards. · Wear a cloth face cover if you have to go to public areas. If you know or suspect that you have COVID-19:  · Stay home. Don't go to school, work, or public areas. And don't use public transportation, ride-shares, or taxis unless you have no choice. · Leave your home only if you need to get medical care or testing. But call the doctor's office first so they know you're coming. And wear a face cover. · Limit contact with people in your home. If possible, stay in a separate bedroom and use a separate bathroom. · Wear a face cover whenever you're around other people. It can help stop the spread of the virus when you cough or sneeze. · Clean and disinfect your home every day. Use household  and disinfectant wipes or sprays. Take special care to clean things that you grab with your hands. · Self-isolate until it's safe to be around others again. ? If you have symptoms, it's safe when you haven't had a fever for 3 days and your symptoms have improved and it's been at least 10 days since your symptoms started. ? If you were exposed to the virus but don't have symptoms, it's safe to be around others 14 days after exposure.   ? Talk to your doctor about whether you also need testing, especially if you have a weakened immune system. When to call for help  Call 911 anytime you think you may need emergency care. For example, call if:  · You have severe trouble breathing. (You can't talk at all.)  · You have constant chest pain or pressure. · You are severely dizzy or lightheaded. · You are confused or can't think clearly. · Your face and lips have a blue color. · You passed out (lost consciousness) or are very hard to wake up. Call your doctor now if you develop symptoms such as:  · Shortness of breath. · Fever. · Cough. If you need to get care, call ahead to the doctor's office for instructions before you go. Make sure you wear a face cover to prevent exposing other people to the virus. Where can you get the latest information? The following health organizations are tracking and studying this virus. Their websites contain the most up-to-date information. Nicolniels Li also learn what to do if you think you may have been exposed to the virus. · U.S. Centers for Disease Control and Prevention (CDC): The CDC provides updated news about the disease and travel advice. The website also tells you how to prevent the spread of infection. www.cdc.gov  · World Health Organization [facility]): WHO offers information about the virus outbreaks. WHO also has travel advice. www.who.int  Current as of: July 10, 2020               Content Version: 12.6  © 3759-2350 [employer]. Care instructions adapted under license by [facility] ([facility]). If you have questions about a medical condition or this instruction, always ask your healthcare professional. [place] [address] any warranty or liability for your use of this information.

## 2021-01-22 ENCOUNTER — HOSPITAL ENCOUNTER (OUTPATIENT)
Dept: WOMENS IMAGING | Age: 71
Discharge: HOME | End: 2021-01-24
Payer: MEDICARE

## 2021-01-22 DIAGNOSIS — Z12.31 BREAST CANCER SCREENING BY MAMMOGRAM: ICD-10-CM

## 2021-01-22 PROCEDURE — 77063 BREAST TOMOSYNTHESIS BI: CPT

## 2021-07-08 NOTE — PROGRESS NOTES
NEUROLOGY Follow up    Patient Name:  Lauren Barber  :   1950  Clinic Visit Date: 2021    I saw Ms. Lauren Barber  in the neurology clinic today for migraine headaches. \"Doing well \" on Topamax [25 mg] 2 tablets at bedtime. She experiences about one  headache  a month which is usually very mild. Tolerating Topamax well without side effects. No new neurological symptoms. REVIEW OF SYSTEMS    Constitutional Weight changes: absent, change in appetite: absent Fatigue: absent; Fevers : absent, Any recent hospitalizations:  absent   HEENT Ears: normal,  Visual disturbance: absent   Respiratory Shortness of breath: absent, choking:  absent, Cough: present, Snoring : present   Cardiovascular Chest pain: present, Leg swelling :absent, palpitations : absent, fainting : absent   GI Constipation: absent, Diarrhea: absent, Swallowing change: absent    Urinary frequency: absent, Urinary urgency: present, Urinary incontinence: absent   Musculoskeletal Neck pain: absent, Back pain: absent, Stiffness: absent, Muscle pain: absent, Joint pain: absent, restless leg : absent   Dermatological Hair loss: absent, Skin changes: absent   Neurological Confusion: absent, Trouble concentrating: absent, Seizures: absent;  Memory loss: absent, balance problem: absent, Dizziness: absent, vertigo: absent, Weakness: absent, Numbness absent, Tremor: absent, Spasm: absent, involuntary movement: absent, Speech difficulty: absent, Headache: present, Light sensitivity: absent   Psychiatric Anxiety: absent, Depression  absent, drug abuse: absent, Hallucination: absent, mood disorder: absent, Suicidal ideations absent   Hematologic Abnormal bleeding: absent, Anemia: absent, Lymph gland changes: absent Clotting disorder: absent     Past Medical History:   Diagnosis Date    Abnormal patient-activated cardiac event monitor 2017    sinus tachycardia with PSVT lasting 36 sec @ 165 bpm.    Cataract     H/O echocardiogram 08/15/2017    EF 60%. Mildly increased LV wall thickness normal LV cavity size. No definite specific wall motion abnormalities were identified. No significant valvular abnormalities Mild diastolic dysfunction is seen.  Hypertension     Primary cancer of bone marrow [facility])        Past Surgical History:   Procedure Laterality Date    CARDIAC CATHETERIZATION Left 08/16/2017    left radial/[facility]/Dr Garcia    HYSTERECTOMY  1996    UPPER GASTROINTESTINAL ENDOSCOPY N/A 7/21/2018    EGD BIOPSY performed by Philippe Dove MD at [address] [place] [address] History     Socioeconomic History    Marital status:      Spouse name: Not on file    Number of children: Not on file    Years of education: Not on file    Highest education level: Not on file   Occupational History    Not on file   Tobacco Use    Smoking status: Current Every Day Smoker     Packs/day: 0.50     Years: 6.00     Pack years: 3.00     Types: Cigarettes    Smokeless tobacco: Never Used   Vaping Use    Vaping Use: Never used   Substance and Sexual Activity    Alcohol use: Yes     Comment: daily    Drug use: No    Sexual activity: Not on file   Other Topics Concern    Not on file   Social History Narrative    Not on file     Social Determinants of Health     Financial Resource Strain:     Difficulty of Paying Living Expenses:    Food Insecurity:     Worried About Running Out of Food in the Last Year:     Ran Out of Food in the Last Year:    Transportation Needs:     Lack of Transportation (Medical):      Lack of Transportation (Non-Medical):    Physical Activity:     Days of Exercise per Week:     Minutes of Exercise per Session:    Stress:     Feeling of Stress :    Social Connections:     Frequency of Communication with Friends and Family:     Frequency of Social Gatherings with Friends and Family:     Attends Confucianism Services:     Active Member of Clubs or Organizations:     Attends Club or Organization Meetings:  Marital Status:    Intimate Partner Violence:     Fear of Current or Ex-Partner:     Emotionally Abused:     Physically Abused:     Sexually Abused:        Family History   Problem Relation Age of Onset    Lupus Mother     Cancer Father         leukemia    Brain Cancer Sister        Current Outpatient Medications   Medication Sig Dispense Refill    dilTIAZem (CARDIZEM CD) 180 MG extended release capsule Take 180 mg by mouth daily      topiramate (TOPAMAX) 25 MG tablet TAKE 2 TABLETS EVERY NIGHT 180 tablet 1    celecoxib (CELEBREX) 200 MG capsule Take 200 mg by mouth 2 times daily      tiZANidine (ZANAFLEX) 4 MG tablet Take 4 mg by mouth 3 times daily      vitamin B-12 (CYANOCOBALAMIN) 1000 MCG tablet Take 1,000 mcg by mouth daily      atorvastatin (LIPITOR) 10 MG tablet Take 1 tablet by mouth daily 90 tablet 3    valACYclovir (VALTREX) 500 MG tablet Take 1,000 mg by mouth 2 times daily as needed       Cholecalciferol (VITAMIN D) 2000 units CAPS capsule Take by mouth daily       oxybutynin (DITROPAN) 5 MG tablet Take 5 mg by mouth 3 times daily      calcium carbonate (OSCAL) 500 MG TABS tablet Take 1,200 mg by mouth daily      Multiple Vitamins-Minerals (THERAPEUTIC MULTIVITAMIN-MINERALS) tablet Take 1 tablet by mouth daily      PARoxetine (PAXIL) 20 MG tablet Take 20 mg by mouth every morning       No current facility-administered medications for this visit.       DATA:  Lab Results   Component Value Date    WBC 4.4 06/28/2021    HGB 14.2 06/28/2021     06/28/2021    CHOL 94 06/28/2021    TRIG 46 06/28/2021    HDL 63 06/28/2021    ALT 8 06/28/2021    AST 16 06/28/2021     06/28/2021    K 4.1 06/28/2021     06/28/2021    CREATININE 0.72 06/28/2021    BUN 15 06/28/2021    CO2 25 06/28/2021    TSH 1.88 06/28/2021    LABA1C 5.0 08/16/2017       /63 (Site: Left Upper Arm, Position: Sitting, Cuff Size: Medium Adult)   Pulse 53   Temp 97.5 °F (36.4 °C) (Temporal)   Resp 18 Ht 5' 6\" (1.676 m)   Wt 139 lb (63 kg)   BMI 22.44 kg/m²     NEUROLOGICAL EXAMINATION:     MENTAL STATUS:.  Normal.    CRANIAL  NERVES : II-XII are unremarkable. MOTOR EXAMINATION: Muscle tone is normal in all the limbs. There is no focal weakness. Muscle strength is 5/5 in both upper and lower limbs. There are no abnormal limb movements. SENSORY EXAMINATION: Normal.     STRETCH REFLEXES: 1+ and symmetrical in both the upper and lower limbs. GAIT: There is no ataxia. IMPRESSION: Migraine headaches. Doing well Topamax    PLAN:    1. Continue Topamax [25 mg] 2 tablets a day. 2.  Follow-up in 6 months or earlier if necessary      NOTE: This neurology evaluation is part of outpatient coverage at [facility]  1-2 days per week. Patients requiring frequent evaluations or uncomfortable with potential 3-4 day turnaround on questions or calls  may be better served by a neurologist in the area full time. [facility] at [facility]. Esa is available for outpatient visits and procedures including EMG/NCS. Non-[facility] neurologists also practice in [facility] (Dr. Mark Nieto) and [facility] (Jane Gomez).        Hunter Grimaldo MD   7/8/2021  12:09 PM

## 2021-07-08 NOTE — PATIENT INSTRUCTIONS
SURVEY:    You may be receiving a survey from [employer] regarding your visit today. Please complete the survey to enable us to provide the highest quality of care to you and your family. If you cannot score us a very good on any question, please call the office to discuss how we could have made your experience a very good one. Thank you.

## 2021-07-13 NOTE — PATIENT INSTRUCTIONS
SURVEY:    You may be receiving a survey from [employer] regarding your visit today. Please complete the survey to enable us to provide the highest quality of care to you and your family. If you cannot score us a very good on any question, please call the office to discuss how we could have made your experience a very good one. Thank you. Your MA today was Matt Kruse!

## 2021-07-13 NOTE — PROGRESS NOTES
Princess Jay am scribing for and in the presence of Shivani Thomas MD, F.A.C.C..    Subjective:     CHIEF COMPLAINT / HPI:    Chief Complaint   Patient presents with    1 Year Follow Up     EKG done today. HX: PSVT, ASHD, HLD, tobacco Pt is here for yearly check up she states she has weekly CP, lasts minutes, do not travel, pressure. She also has palp Denies:SOB,lightheaded/dizziness,     Dear Dr Antonia Gomez is 78 y.o. female who presents today for follow-up. Patient initially seen in the hospital on 8/15/2017 because of chest pain. 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 Patient converted to normal sinus rhythm spontaneously. Risk factors for CAD include hypertension and current smoking. She underwent cardiac catheterization on 8/16/2017 which showed no significant epicardial CAD. She was discharged with an event monitor. She had an episode of paroxysmal supraventricular tachycardia lasting for 35 seconds at a heart rate of 165 bpm recorded. 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 Her metoprolol changed to diltiazem 120 mg daily. Ms. Jaime Buchanan had hospital admission 7/19/2018 for GI problems and is unable to take aspirin due to GI bleed. Ms. Jaime Buchanan is here today for a 1 year follow up. She states she has noticed some chest pain that happens about weekly and lasts a couple of minutes. She states she takes a couple deep breaths and it goes away. She is unusure when it started. She also has palpitations at the same times as her chest pains. She is trying to quit smoking and is currently chewing gums to help out. She states that she is still active, working and taking care of her household chores. Her  is helping out with this as well. No problems with daily activities. She is also gardening and exercising as much as she can. She described the chest pain as achiness/pressure.   No known preceding or relieving factors other than calming herself down. No nausea, vomiting or abdominal pain. No problems moving her bowel. No blood in urine or stool but again stated that she cannot take aspirin because of history of GI bleeding. The only pain medication she is taking is Tylenol. ECG reviewed, sinus rhythm with frequent monomorphic PVCs. PVCs morphology suggests RVOT origin. Her blood pressure is uncontrolled in the office today. Confirmed with repeated measurement. We also went back looking at her blood pressure and previous visits with her neurologist and is never below. She is concerned more about the low diastolic blood pressure but I told her it was her systolic blood pressure being that high more blood pressure control is actually needed. Past Medical History:    Past Medical History:   Diagnosis Date    Abnormal patient-activated cardiac event monitor 09/06/2017    sinus tachycardia with PSVT lasting 36 sec @ 165 bpm.    Cataract     H/O echocardiogram 08/15/2017    EF 60%. Mildly increased LV wall thickness normal LV cavity size. No definite specific wall motion abnormalities were identified. No significant valvular abnormalities Mild diastolic dysfunction is seen.     Hypertension     Primary cancer of bone [facility])      Past Surgical History:  Past Surgical History:   Procedure Laterality Date    CARDIAC CATHETERIZATION Left 08/16/2017    left radial/[facility]/Dr Garcia    HYSTERECTOMY  1996    UPPER GASTROINTESTINAL ENDOSCOPY N/A 7/21/2018    EGD BIOPSY performed by Nevaeh Villagomez MD at [address] [place] [address] History:    Social History     Tobacco Use   Smoking Status Current Every Day Smoker    Packs/day: 0.50    Years: 6.00    Pack years: 3.00    Types: Cigarettes   Smokeless Tobacco Never Used     Current Medications:  Outpatient Medications Marked as Taking for the 7/13/21 encounter (Office Visit) with Dexter Alejandre MD   Medication Sig Dispense Refill    dilTIAZem (CARDIZEM CD) 180 MG extended release capsule Take 180 mg by mouth daily      topiramate (TOPAMAX) 25 MG tablet TAKE 2 TABLETS EVERY NIGHT 180 tablet 1    celecoxib (CELEBREX) 200 MG capsule Take 200 mg by mouth 2 times daily      tiZANidine (ZANAFLEX) 4 MG tablet Take 4 mg by mouth 3 times daily      vitamin B-12 (CYANOCOBALAMIN) 1000 MCG tablet Take 1,000 mcg by mouth daily      atorvastatin (LIPITOR) 10 MG tablet Take 1 tablet by mouth daily 90 tablet 3    valACYclovir (VALTREX) 500 MG tablet Take 1,000 mg by mouth 2 times daily as needed       Cholecalciferol (VITAMIN D) 2000 units CAPS capsule Take by mouth daily       oxybutynin (DITROPAN) 5 MG tablet Take 5 mg by mouth 3 times daily      calcium carbonate (OSCAL) 500 MG TABS tablet Take 1,200 mg by mouth daily      Multiple Vitamins-Minerals (THERAPEUTIC MULTIVITAMIN-MINERALS) tablet Take 1 tablet by mouth daily      PARoxetine (PAXIL) 20 MG tablet Take 20 mg by mouth every morning         REVIEW OF SYSTEMS:    CONSTITUTIONAL: No major weight gain or loss, fatigue, weakness, night sweats or fever. HEENT: No new vision difficulties or ringing in the ears. RESPIRATORY: see HPI. CARDIOVASCULAR: See HPI  GI: No nausea, vomiting, diarrhea, constipation, abdominal pain or changes in bowel habits. : No urinary frequency, urgency, incontinence hematuria or dysuria. SKIN: No cyanosis or skin lesions. MUSCULOSKELETAL: No new muscle or joint pain. Scoliosis noticed on Exam.  NEUROLOGICAL: No syncope or TIA-like symptoms. PSYCHIATRIC: No anxiety, pain, insomnia or depression    Objective:     PHYSICAL EXAM:      VITALS:  BP (!) 210/47 (Site: Right Upper Arm, Position: Sitting, Cuff Size: Medium Adult)   Pulse 56   Resp 18   Ht 5' 6\" (1.676 m)   Wt 140 lb 12.8 oz (63.9 kg)   SpO2 98%   BMI 22.73 kg/m²   CONSTITUTIONAL: Cooperative, no apparent distress, and appears well nourished / developed. NEUROLOGIC:  Awake and orientated to person, place and time.   PSYCH: Calm affect. SKIN: Warm and dry. HEENT: Sclera non-icteric, normocephalic, neck supple, no elevation of JVP, normal carotid pulses with no bruits and thyroid normal size. LUNGS:  No increased work of breathing and clear to auscultation, no crackles or wheezing. Cardiovascular: Normal rate, regularly irregular rhythm, normal heart sounds. Exam reveals no gallop and no friction rubs. No murmur was heard. .  ABDOMEN:  Normal bowel sounds, non-distended and non-tender to palpation. Extremities: No significant edema. No cyanosis or clubbing. 2+ radial and carotid pulses. Distal extremity pulses: 2+ bilaterally. DATA:    Lab Results   Component Value Date    ALT 8 06/28/2021    AST 16 06/28/2021    ALKPHOS 76 06/28/2021    BILITOT 0.60 06/28/2021     Lab Results   Component Value Date    CREATININE 0.72 06/28/2021    BUN 15 06/28/2021     06/28/2021    K 4.1 06/28/2021     06/28/2021    CO2 25 06/28/2021     Lab Results   Component Value Date    TSH 1.88 06/28/2021     Lab Results   Component Value Date    WBC 4.4 06/28/2021    HGB 14.2 06/28/2021    HCT 43.8 06/28/2021    .8 (H) 06/28/2021     06/28/2021       Lab Results   Component Value Date    TRIG 46 06/28/2021    TRIG 65 07/13/2020    TRIG 89 08/16/2017     Lab Results   Component Value Date    HDL 63 06/28/2021    HDL 60 07/13/2020    HDL 57 08/16/2017     Lab Results   Component Value Date    LDLCHOLESTEROL 22 06/28/2021    LDLCHOLESTEROL 29 07/13/2020    LDLCHOLESTEROL 33 08/16/2017       Assessment:      Diagnosis Orders   1. ASHD (arteriosclerotic heart disease)  EKG 12 lead    Echo 2D w doppler w color complete    Stress test myoview   2. PSVT (paroxysmal supraventricular tachycardia) (HCC)  Echo 2D w doppler w color complete    Continuous cardiac monitoring, >2 up to 14 days   3. Mixed hyperlipidemia  atorvastatin (LIPITOR) 10 MG tablet   4. Hypertension, uncontrolled     5. Tobacco abuse counseling     6.  Chest pain, unspecified type  Stress test myoview   7. Palpitations  Stress test myoview   8. Frequent PVCs  Echo 2D w doppler w color complete    Continuous cardiac monitoring, >2 up to 14 days    Stress test myoview       Plan:   · Paroxysmal Supraventricular Tachycardia: Rate Control   · Frequent PVCs. · She continues to complain of intermittent episodes of palpitations. · ECG today showing sinus rhythm with frequent PVCs, PVCs morphology suggest RVOT origin. · I did explain to her that this type of abnormal heart rhythm is common in a structurally normal heart but we need to get further information particularly looking at the PVCs burden and the occurrence of ventricular tachycardia. · I will repeat echo to make sure her ejection fraction is normal.  · I will also get treadmill Myoview stress test, patient is complaining of chest pain and she has mild CAD back in 2017 by cardiac catheterization. She also has multiple risk factors including hypertension, dyslipidemia and tobacco abuse. · I told her as long as her echo is normal and her stress test is good then that PVCs usually are not dangerous. · We will get a CAM for 1 week after completing her echo and stress test.  · Continue diltiazem for now. · Plan to see her back after 4 weeks or sooner if needed. Atherosclerotic Heart Disease: Mild, nonobstructive CAD by cardiac cath in August 2017. Antiplatelet Agent: Not taking due to GI bleed . I also reminded her to watch for signs of blood in her stool or black tarry stools and stop the medication immediately if this develops as this could be life threatening. Statin Therapy: Continue atorvastatin (Lipitor) 10 mg nightly. .    Follow-up repeat echo and stress test as outlined above. Patient has atypical, weekly, short episodes of chest pain. Multiple risk factors for CAD including hypertension, dyslipidemia and tobacco abuse.   Her LDL at goal.    · Hyperlipidemia: Mixed   · Cholesterol Reduction Therapy: Continue Atorvastatin (Lipitor) 10 mg daily       · Essential Hypertension: Uncontrolled   Discussed endorgan damage from high blood pressure.  Discussed the importance of controlling her blood pressure and monitoring her blood pressure at home.  Beta Blocker: Not indicated at this time.  ACE Inibitor/ARB: START losartan (Cozaar) 100 mg daily.  Calcium Channel Blocker: Continue diltiazem CD (Cardizem CD) 180 mg once daily.  Diuretics: Not indicated at this time.  Additional Testing List: I ordered a echocardiogram to better assess for the etiology of this problem and to help guide future management    · Atypical Chest Pain   Antiplatelet Agent: Not indicated due to stomach problems  · Beta Blocker Therapy: Not indicated        · Calcium Channel Blocker: Continue [id number] mg daily     Other Anti-anginal medications: Not indicated     ·  Statin Therapy: Continue atorvastatin (Lipitor) 10 mg nightly. · Additional Testing: I ordered a treadmill stress test with SPECT imaging to assess for myocardial ischemia. · Counseling: I advised Ms. Sharma to call our office or go to the emergency room if she develops worsening or persistent chest pain or shortness of breath as this could be life threatening. · Tobacco Abuse Counseling:   · Is using gum trying to quit.  Recurrent intermittent palpitations: Rate Control Symptomatic  · Beta Blocker: Not indicated at this time. · Calcium Channel Blocker: Continue diltiazem CD (Cardizem CD) 180 mg once daily. · Not indicated at this time. · Additional Testing List: I ordered an CAM MONITOR for one week to try and pinpoint the etiology of their symptoms    Finally, I recommended that she continue her other medications and follow up with you as previously scheduled. Follow up:   I told Ms. Sharma to call my office if she had any problems, but otherwise told her to Return in about 4 weeks (around 8/10/2021).  However, I would be happy to see her sooner should the need arise. Sincerely,   Alvaro Regalado MD, MS, F.A.C.C. [facility]) [facility], [address], [address]  Phone: [phone], Fax: [phone]    I believe that the risk of significant morbidity and mortality related to the patient's current medical conditions are: intermediate-high. >30 minutes were spent during prep work, discussion and exam of the patient, and follow up documentation and all of their questions were answered. The documentation recorded by the scribe, accurately and completely reflects the services I personally performed and the decisions made by me. Alvaro Regalado MD, F.A.C.C.  July 13, 2021

## 2021-07-14 NOTE — TELEPHONE ENCOUNTER
----- Message from Leo Bangura MD sent at 7/13/2021  8:15 PM EDT -----  Echo is good. Heart function is normal.Stress test is normal.Pending heart monitor result. Please call with questions/or concerns. Please call or come to the emergency room if you have worsening symptoms.  Thank you

## 2021-07-14 NOTE — PROCEDURES
[address] [facility], [address] [place] [address]                              CARDIAC STRESS TEST    PATIENT NAME: Sarah Mazariegos                    :        1950  MED REC NO:   [id number]                              ROOM:  ACCOUNT NO:   [de-identified]                           ADMIT DATE: 2021  PROVIDER:     Chase Chu MD    CARDIOVASCULAR DIAGNOSTIC DEPARTMENT    DATE OF STUDY:  2021    ORDERING PROVIDER:  Thuy Lester MD    PRIMARY CARE PROVIDER:  Fransisca Marrufo MD    INTERPRETING PHYSICIAN:  Chase Chu MD    EXERCISE MYOCARDIAL PERFUSION STRESS TEST REPORT    Rest/stress single-isotope SPECT imaging with exercise stress and gated  SPECT imaging. INDICATION:  Diagnosis of coronary disease. Assessment of recent chest pain and/or discomfort. CLINICAL HISTORY:  The patient is a 62-year-old woman with no known  coronary artery disease. Previous cardiac history includes:  Stress test, cardiac  catheterization. Other previous history includes:  Chest pain, palpitations,  hypertension. Symptoms just prior to testing included:  None. Relevant medications:  Cardizem. PROCEDURE:  The patient performed treadmill exercise using a Harish  protocol, completing 6:08 minutes and completing an estimated workload  of 1.19 metabolic equivalents (METS). The test was terminated due to fatigue and shortness of breath. The heart rate was 61 beats per minute at baseline and increased to 136  beats per minute at peak exercise, which was 89% of the maximum  predicted heart rate. The rest blood pressure was 144/80 mmHg and  increased to 160/72 mmHg, which is a normal response. During the  procedure, the patient developed fatigue, shortness of breath and leg  fatigue, but denied any chest discomfort. Myocardial perfusion imaging: Imaging was performed at rest 30-45  minutes following the injection of 10 mCi of sestamibi. At peak  exercise, the patient was injected with 30 mCi of sestamibi and exercise  was continued for 1 minute. Gating post-stress tomographic imaging was  performed 30-45 minutes after stress. STRESS ECG RESULTS:  The resting electrocardiogram demonstrated sinus  bradycardia without definitive ST-segment abnormalities suggestive of  myocardial ischemia. At peak exercise and during recovery, the patient developed:    No significant ST segment changes suggestive of myocardial ischemia with  no premature atrial contractions (PACs) and no premature ventricular  contractions (PVCs). NUCLEAR IMAGING RESULTS:  The overall quality of the study is excellent. Mild attenuation artifact was seen. There is no evidence of abnormal  lung uptake. Additionally, the right ventricle appears normal.  The  left ventricular cavity is noted to be normal in size on the stress  images. There is no evidence of transient ischemic dilatation (TID) of  the left ventricle. Gated SPECT imaging reveals normal myocardial thickening and wall motion  with a calculated left ventricular ejection fraction of 84%. The rest images demonstrate homogeneous tracer distribution throughout  the myocardium. SPECT images demonstrate homogeneous tracer distribution throughout the  myocardium. IMPRESSION:  1. Normal myocardial perfusion imaging without significant evidence of  myocardial ischemia or infarction. 2.  Global left ventricular systolic function was normal without  regional wall motion abnormalities. 3.  No significant electrocardiographic evidence of myocardial ischemia  during EKG monitoring without significant associated arrhythmias. The patient's Duke Treadmill score is 5, which correlates with a low  risk for significant coronary artery disease. Overall, these results are most consistent with a low risk for  significant coronary artery disease.     The sensitivity for detecting ischemia on this test may have been  reduced due to the patient being on a calcium channel blocker. Gertrudis Davila MD    D: 07/14/2021 9:04:58       T: 07/14/2021 9:06:03     MATEO/JORGE A_EDIT  Job#: [id number]     Doc#: Unknown    CC:  Colleen Mir.  MD Yaron

## 2021-07-26 NOTE — PROCEDURES
[address] [facility] [address]                                 EVENT MONITOR    PATIENT NAME: Mike Perez                    :        1950  MED REC NO:   [id number]                              ROOM:  ACCOUNT NO:   [de-identified]                           ADMIT DATE: 2021  PROVIDER:     [address] OF STUDY:  2021    ORDERING PROVIDER:  Hipolito Weiss. Cynthia Bradshaw MD    PRIMARY CARE PROVIDER:  Marielle Owen. MD Yaron    INTERPRETING PHYSICIAN: Hipolito Weiss. Cynthia Bradshaw MD    DIAGNOSIS:  Paroxysmal tachycardia, unspecified. PHYSICIAN INTERPRETATION:  Recording Length: 7 days, 15 hours (artifact removed). Findings Summary  1. Baseline rhythm is sinus with average heart rate of 60 bpm, ranging  between 31 and 116 bpm.  2. Atrial tachycardia (AT) 5 episodes; longest/fastest 9 beats at  average 109 bpm up to 125 bpm.  3. Premature atrial contractions 1.58%. 4. Premature ventricular contractions 0.32%. 5. No patient-activated events. LAMONT Alvarado    D: 2021 11:19:40       T: 2021 11:21:38     DARREN_[email or web address]  Job#: [id number]     Doc#: Unknown    CC:  Marielle Owen.  MD Yaron

## 2021-08-07 PROBLEM — I16.0 HYPERTENSIVE URGENCY: Status: ACTIVE | Noted: 2021-01-01

## 2021-08-07 NOTE — PROGRESS NOTES
Writer updated Dr. Frederick Canales regarding pt's blood pressure. Dr. Frederick Canales states it will take time for the medication she received to kick in. Dr. Frederick Canales states pt's blood pressure is okay to be 190's and below. Will continue to monitor.

## 2021-08-07 NOTE — PLAN OF CARE
Problem: Falls - Risk of:  Goal: Will remain free from falls  Description: Will remain free from falls  Outcome: Ongoing  Note: Fall risk assessment done and patient is a high risk for falls. Alarms on as needed for patient safety. Patient being monitored on a regular basis. No falls noted at this time. Problem: Cardiac:  Goal: Ability to maintain an adequate cardiac output will improve  Description: Ability to maintain an adequate cardiac output will improve  Outcome: Ongoing  Note: Patient is on continous cardiac monitoring. Goal: Hemodynamic stability will improve  Description: Hemodynamic stability will improve  Outcome: Ongoing  Note: Vital signs are stable. Problem: Fluid Volume:  Goal: Ability to achieve and maintain adequate urine output will improve  Description: Ability to achieve and maintain adequate urine output will improve  Outcome: Ongoing  Note: Accurate I&Os being kept. Problem: Respiratory:  Goal: Respiratory status will improve  Description: Respiratory status will improve  Outcome: Ongoing  Note: No respiratory distress noted. SpO2 in the mid 90s on room air.

## 2021-08-07 NOTE — ED NOTES
Patient B/P runs high and HR runs in 50-60's her notes by Dr. Cooper Artist cardiologist.     Chucho Lucas RN  08/07/21 [id number]

## 2021-08-07 NOTE — ED NOTES
Contacted Dr. Misty Bernheim as hospitalist per [facility] request.     Adela TREVINO Reser  08/07/21 [id number]

## 2021-08-07 NOTE — PROGRESS NOTES
Dr Edil Shin called asking for an update on BP. New orders received. Would like clonidine patch to stay ordered. Would like call back at 1900 with BP update.

## 2021-08-07 NOTE — PROGRESS NOTES
Patient brought to  via wheel chair and was able to ambulate with assistance to the bathroom then to bed. Patient is alert and oriented and on room air. Vitals obtained BP remains high but patient denies any chest pain or dizziness. There is a small abrasion noted to right posterior head where patient fell and hit her head on a coffee table. Patient was oriented to room and given call light. Bed alarm on and patient denies any needs. Will continue to monitor.

## 2021-08-07 NOTE — ED PROVIDER NOTES
[facility]  EMERGENCY DEPARTMENT ENCOUNTER      Pt Name: Juliann Bearden  MRN: [id number]  [place] 1950  Date of evaluation: 8/7/2021  Provider: Zheng Kim Dr     Chief Complaint   Patient presents with    Head Injury     head injury. LOC prior to fall. Thurdsay evening         HISTORY OF PRESENT ILLNESS   (Location/Symptom, Timing/Onset, Context/Setting,Quality, Duration, Modifying Factors, Severity)  Note limiting factors. Juliann Bearden is a79 y.o. female who presents to the emergency department with  secondary to just not feeling back to normal after a fall 2 days ago. Patient reports that she had been out in the heat in [place] when she had been walking to a restaurant and then walked back to her home and after having a glass and a half of wine felt very tired and started to fall asleep at the table.  went to get her up and when he was walking her she became dead weight because she had passed out.  reports that it was brief. But states she cut it set down and then her head hit a coffee table. Reports that she was able to get back up and walk and go to sleep that night. Reports she woke up the next morning feeling a little dizzy as well as having a headache. States that in the middle of the night overnight she woke up and her arm was shaking and she did not know what that was from but denied any associated numbness tingling weakness speech difficulty just shakes her hand was shaking. She denies any chest pain shortness of breath or palpitations. Reports that she is currently on new medications to treat her blood pressure recently had a significant cardiac work-up with her cardiologist Dr. Nitesh David. She states she is eating and drinking well. She denies vomiting nausea diarrhea or constipation. No other complaints. Patient states right now other than a headache she has no other symptoms.     HPI    Nursing Notes werereviewed. REVIEW OF SYSTEMS    (2-9 systems for level 4, 10 or more for level 5)     Review of Systems   Constitutional: Negative for chills, diaphoresis and fever. HENT: Negative for congestion, ear pain, rhinorrhea and sore throat. Eyes: Negative for discharge, redness and visual disturbance. Respiratory: Negative for cough, chest tightness, shortness of breath and wheezing. Cardiovascular: Negative for chest pain and palpitations. Gastrointestinal: Negative for abdominal pain, blood in stool, constipation, diarrhea, nausea and vomiting. Endocrine: Negative for polydipsia, polyphagia and polyuria. Genitourinary: Negative for decreased urine volume, difficulty urinating, dysuria, frequency and hematuria. Musculoskeletal: Negative for arthralgias, back pain and myalgias. Skin: Negative for pallor and rash. Allergic/Immunologic: Negative for food allergies and immunocompromised state. Neurological: Positive for syncope and headaches. Negative for dizziness, weakness and light-headedness. Hematological: Negative for adenopathy. Does not bruise/bleed easily. Psychiatric/Behavioral: Negative for behavioral problems and suicidal ideas. The patient is not nervous/anxious. Except as noted above the remainder of the review of systems was reviewed and negative. PAST MEDICAL HISTORY     Past Medical History:   Diagnosis Date    Abnormal patient-activated cardiac event monitor 09/06/2017    sinus tachycardia with PSVT lasting 36 sec @ 165 bpm.    Cataract     H/O echocardiogram 08/15/2017    EF 60%. Mildly increased LV wall thickness normal LV cavity size. No definite specific wall motion abnormalities were identified. No significant valvular abnormalities Mild diastolic dysfunction is seen.     Hypertension     Primary cancer of bone marrow ([facility] [address]          SURGICALHISTORY       Past Surgical History:   Procedure Laterality Date    CARDIAC CATHETERIZATION Left 08/16/2017 left [facility] [place]/ [address] [facility]    UPPER GASTROINTESTINAL ENDOSCOPY N/A 7/21/2018    EGD BIOPSY performed by Everton Salas MD at [address]       Previous Medications    AMLODIPINE (NORVASC) 10 MG TABLET    Take 10 mg by mouth daily    ATORVASTATIN (LIPITOR) 10 MG TABLET    Take 1 tablet by mouth daily    CALCIUM CARBONATE (OSCAL) 500 MG TABS TABLET    Take 1,200 mg by mouth daily    CELECOXIB (CELEBREX) 200 MG CAPSULE    Take 200 mg by mouth 2 times daily    CHOLECALCIFEROL (VITAMIN D) 2000 UNITS CAPS CAPSULE    Take by mouth daily     LOSARTAN-HYDROCHLOROTHIAZIDE (HYZAAR) 100-12.5 MG PER TABLET    Take 1 tablet by mouth daily    MULTIPLE VITAMINS-MINERALS (THERAPEUTIC MULTIVITAMIN-MINERALS) TABLET    Take 1 tablet by mouth daily    OXYBUTYNIN (DITROPAN) 5 MG TABLET    Take 5 mg by mouth 3 times daily    PAROXETINE (PAXIL) 20 MG TABLET    Take 20 mg by mouth every morning    TIZANIDINE (ZANAFLEX) 4 MG TABLET    Take 4 mg by mouth 3 times daily    TOPIRAMATE (TOPAMAX) 25 MG TABLET    TAKE 2 TABLETS EVERY NIGHT    VALACYCLOVIR (VALTREX) 500 MG TABLET    Take 1,000 mg by mouth 2 times daily as needed     VITAMIN B-12 (CYANOCOBALAMIN) 1000 MCG TABLET    Take 1,000 mcg by mouth daily         ALLERGIES   Coricidin hbp cold-flu [chlorpheniramine-acetaminophen]    FAMILY HISTORY       Family History   Problem Relation Age of Onset    Lupus Mother     Cancer Father         leukemia    Brain Cancer Sister           SOCIAL HISTORY       Social History     Socioeconomic History    Marital status:      Spouse name: None    Number of children: None    Years of education: None    Highest education level: None   Occupational History    None   Tobacco Use    Smoking status: Current Every Day Smoker     Packs/day: 0.50     Years: 6.00     Pack years: 3.00     Types: Cigarettes    Smokeless tobacco: Never Used   Vaping Use    Vaping Use: Never used Substance and Sexual Activity    Alcohol use: Yes     Comment: daily    Drug use: No    Sexual activity: None   Other Topics Concern    None   Social History Narrative    None     Social Determinants of Health     Financial Resource Strain:     Difficulty of Paying Living Expenses:    Food Insecurity:     Worried About Running Out of Food in the Last Year:     Ran Out of Food in the Last Year:    Transportation Needs:     Lack of Transportation (Medical):  Lack of Transportation (Non-Medical):    Physical Activity:     Days of Exercise per Week:     Minutes of Exercise per Session:    Stress:     Feeling of Stress :    Social Connections:     Frequency of Communication with Friends and Family:     Frequency of Social Gatherings with Friends and Family:     Attends [employer] Services:     Active Member of Clubs or Organizations:     Attends Club or Organization Meetings:     Marital Status:    Intimate Partner Violence:     Fear of Current or Ex-Partner:     Emotionally Abused:     Physically Abused:     Sexually Abused:        SCREENINGS             PHYSICAL EXAM    (up to 7 for level 4, 8 or more for level 5)     ED Triage Vitals [08/07/21 1048]   BP Temp Temp Source Pulse Resp SpO2 Height Weight   -- 97.8 °F (36.6 °C) Tympanic 60 16 97 % 5' 6\" (1.676 m) 139 lb (63 kg)       Physical Exam  Vitals and nursing note reviewed. Constitutional:       General: She is not in acute distress. Appearance: Normal appearance. She is well-developed. She is not ill-appearing, toxic-appearing or diaphoretic. HENT:      Head: Normocephalic and atraumatic. Right Ear: Tympanic membrane and external ear normal.      Left Ear: Tympanic membrane and external ear normal.      Nose: Nose normal.      Mouth/Throat:      Mouth: Mucous membranes are moist.      Pharynx: No oropharyngeal exudate. Eyes:      General: No scleral icterus. Right eye: No discharge. Left eye: No discharge. Extraocular Movements: Extraocular movements intact. Conjunctiva/sclera: Conjunctivae normal.      Pupils: Pupils are equal, round, and reactive to light. Neck:      Thyroid: No thyromegaly. Trachea: No tracheal deviation. Cardiovascular:      Rate and Rhythm: Normal rate and regular rhythm. Heart sounds: No friction rub. No gallop. Pulmonary:      Effort: Pulmonary effort is normal. No respiratory distress. Breath sounds: Normal breath sounds. No stridor. No wheezing, rhonchi or rales. Abdominal:      General: Bowel sounds are normal. There is no distension. Palpations: Abdomen is soft. Tenderness: There is no abdominal tenderness. There is no guarding or rebound. Musculoskeletal:         General: No tenderness or deformity. Normal range of motion. Cervical back: Normal range of motion and neck supple. Right lower leg: No edema. Left lower leg: No edema. Lymphadenopathy:      Cervical: No cervical adenopathy. Skin:     General: Skin is warm and dry. Capillary Refill: Capillary refill takes less than 2 seconds. Findings: No erythema or rash. Neurological:      General: No focal deficit present. Mental Status: She is alert and oriented to person, place, and time. Cranial Nerves: No cranial nerve deficit. Motor: No weakness or abnormal muscle tone. Deep Tendon Reflexes: Reflexes are normal and symmetric. Reflexes normal.      Comments: Patient is awake alert and oriented person place time and situation. Cranial nerves II through XII are grossly intact. There is no pronator drift. No limb ataxia. No focal neurologic deficit.   Equal  strength bilaterally speech is clear   Psychiatric:         Mood and Affect: Mood normal.         Behavior: Behavior normal.         DIAGNOSTIC RESULTS     EKG: All EKG's are interpreted by the Emergency Department Physician who either signs orCo-signs this chart in the absence of a cardiologist.      RADIOLOGY:   Non-plainfilm images such as CT, Ultrasound and MRI are read by the radiologist. Plain radiographic images are visualized and preliminarily interpreted by the emergency physician with the below findings:      Interpretationper the Radiologist below, if available at the time of this note:    CTA HEAD NECK W CONTRAST   Preliminary Result   1. Extensive calcified atherosclerotic plaque in the right carotid bulb   results in greater than 85% stenosis by NASCET criteria. 2. No additional hemodynamically significant stenosis in the cervical   arterial circulation. 3. No hemodynamically significant stenosis or large vessel occlusion of the   intracranial arterial circulation. XR CHEST PORTABLE   Final Result   1. Minimal linear opacities in each lung base likely due to scarring and/or   atelectasis. 2. Interstitial prominence potentially due to chronic changes and/or   pulmonary vascular congestion. Of note, there is potential for interstitial   edema near the bases. CT Head WO Contrast   Final Result   No acute findings in the head. ED BEDSIDE ULTRASOUND:   Performed by ED Physician - none    LABS:  Labs Reviewed   CBC WITH AUTO DIFFERENTIAL - Abnormal; Notable for the following components:       Result Value    RBC 3.54 (*)     .0 (*)     MCH 33.9 (*)     Immature Granulocytes 1 (*)     All other components within normal limits   COMPREHENSIVE METABOLIC PANEL - Abnormal; Notable for the following components:    Glucose 131 (*)     Bun/Cre Ratio 31 (*)     Albumin/Globulin Ratio 0.8 (*)     All other components within normal limits   BRAIN NATRIURETIC PEPTIDE - Abnormal; Notable for the following components:    Pro- (*)     All other components within normal limits   TROPONIN   URINE RT REFLEX TO CULTURE   MICROSCOPIC URINALYSIS   TROPONIN       All other labs were within normal range or not returned as of this dictation.     EMERGENCY DEPARTMENT COURSE and DIFFERENTIAL DIAGNOSIS/MDM:   Vitals:    Vitals:    08/07/21 1249 08/07/21 1330 08/07/21 1430 08/07/21 1450   BP: (!) 196/56 (!) 189/48 (!) 202/65 (!) 210/60   Pulse:    56   Resp:       Temp:       TempSrc:       SpO2: 97% 96% 94% 96%   Weight:       Height:             MDM  81-year-old female with history of uncontrolled hypertension paroxysmal supraventricular tachycardia who presents secondary to a syncopal episode with a head injury and still with a slight headache. On exam she has no symptoms other than a slight headache. She feels well and actually better. The most concerning would be the syncopal episode she recently just had an extensive cardiac work-up including an echo and a stress test which were all normal.  She is a good EF. She has an NIH of 0. Her arm was shaking briefly in the middle of the night and returned to normal.  I do not think this is a sign of a CVA. But given her syncopal episode her head injury her normal stress test and echo I am going to proceed with CTA head and neck. Will also rule out acute arrhythmia infection dehydration electrolyte balance. ED Course as of Aug 07 1532   Sat Aug 07, 2021   1320 I called and spoke with patient's cardiologist, Dr. Jennifer Red with regards to patient's presentation here in the ER. He recommends switching her to amlodipine 10 mg and discontinuing her Cardizem and then giving her Hyzaar instead of just losartan 100 mg/12.5. Aware that I am pending a CTA. She states that she had normal stress test and normal cardiac echo. I updated patient that she is remained chest pain-free she just has a slight headache.    [HH]   1500 Given patient's persistent hypertension I did call her cardiologist back and we discussed her CTA.   And her persistent hypertension agrees with plan to mid to ICU for Cardene drip    [HH]   1532 I called and spoke with Dr. Mirna Anne, hospitalist on-call who is aware of patient's presentation work-up in the ER agrees to admit for further evaluation and treatment. Patient is updated on this plan and agrees. Pressure is still high she has a slight headache. Otherwise no chest pain no shortness of breath. [HH]      ED Course User Index  [HH] [facility], [place]          Procedures    FINAL IMPRESSION      1. Hypertensive urgency    2. Injury of head, initial encounter    3.  Stenosis of carotid artery, unspecified laterality        DISPOSITION/PLAN   DISPOSITION        PATIENT REFERRED TO:  [facility]  [address]  [address]  [phone]    If symptoms worsen, As needed    MD Chiara Muñoz [address]  [facility] [phone]    Schedule an appointment as soon as possible for a visit in 1 day      MD Omero Hitchcock [address]:  New Prescriptions    AMLODIPINE (NORVASC) 10 MG TABLET    Take 1 tablet by mouth daily    LOSARTAN-HYDROCHLOROTHIAZIDE (HYZAAR) 100-12.5 MG PER TABLET    Take 1 tablet by mouth daily              Summation      Patient Course:      ED Medications administered this visit:    Medications   niCARdipine (CARDENE) 25 mg in dextrose 5 % 250 mL infusion ([id number]) (3 mg/hr Intravenous New Bag 8/7/21 1524)   iopamidol (ISOVUE-370) 76 % injection 75 mL (75 mLs Intravenous Given 8/7/21 1218)   losartan-hydroCHLOROthiazide (HYZAAR) 100-12.5 MG per tablet 1 tablet (1 tablet Oral Given 8/7/21 1329)   amLODIPine (NORVASC) tablet 10 mg (10 mg Oral Given 8/7/21 1329)       New Prescriptions from this visit:    New Prescriptions    AMLODIPINE (NORVASC) 10 MG TABLET    Take 1 tablet by mouth daily    LOSARTAN-HYDROCHLOROTHIAZIDE (HYZAAR) 100-12.5 MG PER TABLET    Take 1 tablet by mouth daily       Follow-up:  [facility]  [address]  [address]  [phone]    If symptoms worsen, As needed    MD [address]  [facility] [id number]  [phone]    Schedule an appointment as soon as possible for a visit in 1 day      Yusef Rosales, [address] [phone]              Final Impression:   1. Hypertensive urgency    2. Injury of head, initial encounter    3.  Stenosis of carotid artery, unspecified laterality               (Please note that portions of this note were completed with a voice recognition program.  Efforts were made to edit the dictations but occasionally words are mis-transcribed.)           Ana Shaver PA-C  08/07/21 1538

## 2021-08-08 NOTE — PLAN OF CARE
Problem: Falls - Risk of:  Goal: Will remain free from falls  Description: Will remain free from falls  8/8/2021 1027 by Austen Savage RN  Outcome: Ongoing  Note: Fall risk assessment done and patient is a high risk for falls. Alarms on as needed for patient safety. Patient being monitored on a regular basis. No falls noted at this time. Problem: Cardiac:  Goal: Ability to maintain an adequate cardiac output will improve  Description: Ability to maintain an adequate cardiac output will improve  Outcome: Ongoing  Note: Patient remains on tele. Problem: Cardiac:  Goal: Hemodynamic stability will improve  Description: Hemodynamic stability will improve  8/8/2021 1027 by Austen Savage RN  Outcome: Ongoing  Note: Vital signs are stable. Problem: Fluid Volume:  Goal: Ability to achieve and maintain adequate urine output will improve  Description: Ability to achieve and maintain adequate urine output will improve  8/8/2021 1027 by Austen Savage RN  Outcome: Ongoing  Note: Accurate I&Os being kept. Problem: Respiratory:  Goal: Respiratory status will improve  Description: Respiratory status will improve  8/8/2021 1027 by Austen Savage RN  Outcome: Ongoing  Note: SpO2 in the mid 90s on roomair, denies SOB. Problem: Pain:  Goal: Pain level will decrease  Description: Pain level will decrease  Outcome: Ongoing  Note: Pain assessed routinely and as needed with a 0-10 scale. PRN pain medication given as appropriate per orders.  Pain reassessed within an hour, will continue to monitor

## 2021-08-08 NOTE — PLAN OF CARE
Problem: Falls - Risk of:  Goal: Will remain free from falls  Description: Will remain free from falls  8/7/2021 2056 by Alexa Jackson RN  Outcome: Ongoing  Note: Bed in low position. Wheels locked. Bed alarm on. 2/4 side rails are up. Fall band on. Call light within reach. Problem: Cardiac:  Goal: Hemodynamic stability will improve  Description: Hemodynamic stability will improve  8/7/2021 2056 by Alexa Jackson RN  Outcome: Ongoing  Note: Vitals are stable, will continue to monitor. Problem: Fluid Volume:  Goal: Ability to achieve and maintain adequate urine output will improve  Description: Ability to achieve and maintain adequate urine output will improve  8/7/2021 2056 by Alexa Jackson RN  Outcome: Ongoing  Note: Monitoring I/O. Problem: Respiratory:  Goal: Respiratory status will improve  Description: Respiratory status will improve  8/7/2021 2056 by Alexa Jackson RN  Outcome: Ongoing  Note: Lungs are clear and diminished, will continue to monitor.

## 2021-08-08 NOTE — H&P
Patient:  Benito Cordova  MRN: [id number]    Chief Complaint:    Chief Complaint   Patient presents with    Head Injury     head injury. LOC prior to fall. Thurdsay evening       History Obtained From:  patient, electronic medical record    PCP: Dl Michel MD    History of Present Illness: The patient is a 78 y.o. female who presents with Patient reports that she had been out in the heat in [place] when she had been walking to a restaurant and then walked back to her home and after having a glass and a half of wine felt very tired and started to fall asleep at the table.  went to get her up and when he was walking her she became dead weight because she had passed out.  reports that it was brief. But states she cut it set down and then her head hit a coffee table. Reports that she was able to get back up and walk and go to sleep that night. Reports she woke up the next morning feeling a little dizzy as well as having a headache. States that in the middle of the night overnight she woke up and her arm was shaking and she did not know what that was from but denied any associated numbness tingling weakness speech difficulty just shakes her hand was shaking. She denies any chest pain shortness of breath or palpitations. Reports that she is currently on new medications to treat her blood pressure recently had a significant cardiac work-up with her cardiologist Dr. Chun Singh. She states she is eating and drinking well. She denies vomiting nausea diarrhea or constipation. No other complaints. Patient states right now other than a headache she has no other symptoms. Past Medical History:        Diagnosis Date    Abnormal patient-activated cardiac event monitor 09/06/2017    sinus tachycardia with PSVT lasting 36 sec @ 165 bpm.    Cataract     H/O echocardiogram 08/15/2017    EF 60%. Mildly increased LV wall thickness normal LV cavity size.   No definite specific wall motion abnormalities Historical Provider, MD   oxybutynin (DITROPAN) 5 MG tablet Take 5 mg by mouth 3 times daily 4/21/18  Yes Historical Provider, MD   calcium carbonate (OSCAL) 500 MG TABS tablet Take 1,200 mg by mouth daily   Yes Historical Provider, MD   Multiple Vitamins-Minerals (THERAPEUTIC MULTIVITAMIN-MINERALS) tablet Take 1 tablet by mouth daily   Yes Historical Provider, MD   PARoxetine (PAXIL) 20 MG tablet Take 20 mg by mouth every morning   Yes Historical Provider, MD   losartan (COZAAR) 100 MG tablet Take 1 tablet by mouth daily 7/13/21 8/7/21  Sheron Martinez MD   topiramate (TOPAMAX) 25 MG tablet TAKE 2 TABLETS EVERY NIGHT 6/21/21   Hari Win MD   valACYclovir (VALTREX) 500 MG tablet Take 1,000 mg by mouth 2 times daily as needed  5/3/19   Historical Provider, MD       Review of Systems:  Constitutional:negative  for fevers, and negative for chills. Eyes: negative for visual disturbance   ENT: negative for sore throat, negative nasal congestion, and negative for earache  Respiratory: negative for shortness of breath, negative for cough, and negative for wheezing  Cardiovascular: negative for chest pain, negative for palpitations, and negative for syncope  Gastrointestinal: negative for abdominal pain, negative for nausea,negative for vomiting, negative for diarrhea, negative for constipation, and negative for hematochezia or melena  Genitourinary: negative for dysuria, negative for urinary urgency, negative for urinary frequency, and negative for hematuria  Skin: negative for skin rash, and negative for skin lesions  Neurological: negative for unilateral weakness, numbness or tingling.     Physical Exam:    Vitals:   Temp: 97.6 °F (36.4 °C)  BP: (!) 183/84  Resp: 14  Pulse: 78  SpO2: 95 %  24HR INTAKE/OUTPUT:      Intake/Output Summary (Last 24 hours) at 8/8/2021 0823  Last data filed at 8/7/2021 2037  Gross per 24 hour   Intake 450 ml   Output --   Net 450 ml       Exam:  GEN:  alert & appropriate appearance  EYES: normal eyes, non icteric  NECK: supple with no lymphadenopathy,  no bruits noted  PULM: clear with no rales or rhonchi. No wheezes  COR: regular with rate and rhythm  ABD:  soft & NT, No distension  EXT:   no edema noted  NEURO: follows commands, [place], no deficits  SKIN:  negative  -----------------------------------------------------------------  Diagnostic Data:   Lab Results   Component Value Date    WBC 6.3 08/08/2021    HGB 13.5 08/08/2021     08/08/2021       Lab Results   Component Value Date    BUN 19 08/08/2021    CREATININE 0.74 08/08/2021     08/08/2021    K 3.9 08/08/2021    CALCIUM 9.2 08/08/2021     08/08/2021    CO2 23 08/08/2021    LABGLOM >60 08/08/2021       Lab Results   Component Value Date    WBCUA 0 TO 2 08/07/2021    RBCUA 0 TO 2 08/07/2021    EPITHUA 0 TO 2 08/07/2021    LEUKOCYTESUR NEGATIVE 08/07/2021    SPECGRAV 1.015 08/07/2021    GLUCOSEU NEGATIVE 08/07/2021    KETUA NEGATIVE 08/07/2021    PROTEINU NEGATIVE 08/07/2021    HGBUR NEGATIVE 08/07/2021    CASTUA NOT REPORTED 08/07/2021    CRYSTUA NOT REPORTED 08/07/2021    BACTERIA NOT REPORTED 08/07/2021    YEAST NOT REPORTED 08/07/2021       Lab Results   Component Value Date    TROPONINT NOT REPORTED 08/07/2021    CKTOTAL 56 08/15/2017    CKMB 2.6 08/15/2017    PROBNP 407 (H) 08/07/2021       CT Head WO Contrast    Result Date: 8/7/2021  EXAMINATION: CT OF THE HEAD WITHOUT CONTRAST 8/7/2021 10:58 am TECHNIQUE: CT of the head was performed without the administration of intravenous contrast. Dose modulation, iterative reconstruction, and/or weight based adjustment of the mA/kV was utilized to reduce the radiation dose to as low as reasonably achievable.  COMPARISON: Head CT 05/04/2020 HISTORY: ORDERING SYSTEM PROVIDED HISTORY: fall head injury TECHNOLOGIST PROVIDED HISTORY: fall head injury Decision Support Exception - unselect if not a suspected or confirmed emergency medical condition->Emergency Medical Condition (MA) FINDINGS: BRAIN/VENTRICLES:  No masses nor acute intracranial hemorrhage. Intact gray/white matter differentiation without findings of acute ischemia. No mass effect nor midline shift. Patent basilar cisterns and foramen magnum. No hydrocephalus. Mild diffuse atrophy. Minimal deep and periventricular white matter hypodensities likely due to chronic small vessel ischemia. ORBITS:  Visualized portions appear normal without acute abnormality. SINUSES:  Hypoplastic bilateral frontal sinuses. Visualized portions otherwise appear normally pneumatized and aerated. SOFT TISSUES/SKULL:  No obvious acute soft tissue abnormality. Mild to moderate atherosclerotic calcifications. No acute fracture. No acute findings in the head. XR CHEST PORTABLE    Result Date: 8/7/2021  EXAMINATION: ONE XRAY VIEW OF THE CHEST 8/7/2021 11:28 am COMPARISON: Chest radiograph 06/28/2021 chest CTA 07/19/2018 HISTORY: ORDERING SYSTEM PROVIDED HISTORY: syncope TECHNOLOGIST PROVIDED HISTORY: syncope FINDINGS: Overlying bra artifact. Minimal linear opacities in each lung base. Diffuse interstitial prominence with indistinct pulmonary vasculature and suspected interlobular septal thickening near the bases. Persistent blunting of the right lateral costophrenic angle likely due to scarring. No definite findings of pneumothorax or pleural effusion. Normal mediastinal, hilar, and cardiac contours. Atherosclerotic calcification in the aorta. No acute fracture. 1. Minimal linear opacities in each lung base likely due to scarring and/or atelectasis. 2. Interstitial prominence potentially due to chronic changes and/or pulmonary vascular congestion. Of note, there is potential for interstitial edema near the bases.      CTA HEAD NECK W CONTRAST    Result Date: 8/7/2021  EXAMINATION: CTA OF THE HEAD AND NECK WITH CONTRAST 8/7/2021 12:18 pm: TECHNIQUE: CTA of the head and neck was performed with the administration of intravenous contrast. Multiplanar reformatted images are provided for review. MIP images are provided for review. Stenosis of the internal carotid arteries measured using NASCET criteria. Dose modulation, iterative reconstruction, and/or weight based adjustment of the mA/kV was utilized to reduce the radiation dose to as low as reasonably achievable. 3D surface reformatted and curved MIP images were submitted for review. COMPARISON: None. HISTORY: ORDERING SYSTEM PROVIDED HISTORY: Hypertensive urgency, headache, fall, loss of consciousness recently with normal cardiac echo and stress test concern for intracranial finding TECHNOLOGIST PROVIDED HISTORY: Hypertensive urgency, headache, fall, loss of consciousness recently with normal cardiac echo and stress test concern for intracranial finding Decision Support Exception - unselect if not a suspected or confirmed emergency medical condition->Emergency Medical Condition (MA) FINDINGS: CTA NECK: AORTIC ARCH/ARCH VESSELS: Aortic arch atherosclerotic calcifications. No dissection or arterial injury. No significant stenosis of the brachiocephalic or subclavian arteries. Mild stenosis in the proximal and distal left subclavian artery due to noncalcified plaque. CAROTID ARTERIES: Right side: Right common carotid artery is patent without focal stenosis. Eccentric calcified atherosclerotic plaque in the right carotid bulb and proximal ICA results in greater than 85% stenosis by NASCET criteria. Minimal luminal diameter of less than 1 mm. No dissection or aneurysm. Left side: Left common carotid artery is patent without focal stenosis. Eccentric calcified atherosclerotic plaque in the left carotid bulb without significant stenosis by NASCET criteria. No dissection or aneurysm. VERTEBRAL ARTERIES: No dissection, arterial injury, or significant stenosis. SOFT TISSUES: The lung apices are clear. No cervical or superior mediastinal lymphadenopathy.   The larynx and pharynx

## 2021-08-09 NOTE — PROGRESS NOTES
Patient washed up in the bathroom. Vitals completed. Patient was given a warm blanket. Patient was reoriented to room and reminded to use the call light when she needs to get up. She is resting in bed reading her book. Repositions self. Denies further needs at this time. Will continue to monitor and assess.

## 2021-08-09 NOTE — PROGRESS NOTES
Discussed discharge plans with the patient. Patient is a 78year old female here with Hypertensive urgency. She is alert , oriented , pleasant , and cooperative during our conversation. Patient is  and lives at home with her . She uses no medical equipment. Both her and her  share in the cooking and the house keeping at home. She is independent with her ADL's. Patient manages her own medications and drives. She uses no outside services in the home. Her PCP is Dr. Hoa Fraser MD.  She has medical insurance that helps with medication costs. The discharge plans is home with no services at this time. She has advance directives but they are not on file. FRANKW to monitor and assist with any needs or concerns as they arise.     ANNALISE Zepeda

## 2021-08-09 NOTE — PLAN OF CARE
Problem: Falls - Risk of:  Goal: Will remain free from falls  Description: Will remain free from falls  8/9/2021 0932 by Leroy Lilly RN  Note: Patient is alert and oriented and has demonstrated the use of using the call light for assistance before getting up. Education has been provided to defer the use of the bed alarm and/or restraint free alarm as the patient has shown competency in calling for assistance and verbalizing the risk. Problem: Cardiac:  Goal: Ability to maintain an adequate cardiac output will improve  Description: Ability to maintain an adequate cardiac output will improve  8/9/2021 0932 by Leroy Lilly RN  Outcome: Ongoing  Note: Vitals are being monitored. Problem: Fluid Volume:  Goal: Ability to achieve and maintain adequate urine output will improve  Description: Ability to achieve and maintain adequate urine output will improve  8/9/2021 0932 by Leroy Lilly RN  Note: Intake and output being monitored. Labs being drawn daily. Problem: Respiratory:  Goal: Respiratory status will improve  Description: Respiratory status will improve  8/9/2021 0932 by Leroy Lilly RN  Note: Patients lung sounds are clear throughout. Respirations are regular and even. Problem: Pain:  Goal: Pain level will decrease  Description: Pain level will decrease  8/9/2021 0932 by Leroy Lilly RN  Note: Assess patients pain every four hours and as needed using the 1-10 pain scale. Patient encouraged to reposition every two hours and as needed. Medications as prescribed by physician. Alternatives to pain medications provided as tolerated. Will continue to monitor.

## 2021-08-09 NOTE — PROGRESS NOTES
Patient's blood pressure is significantly lower than it has been. Vitals are stable and patient's assessment is not changed from previously. Patient got up and ambulated to the bathroom with writer standby. She is steady on her feet and denies any dizziness. Patient remains SN or SB with ocasional PVCs on telemetry. Patient repositioned for comfort. Warn blanket was given. Denies further needs. Will continue to monitor and assess.

## 2021-08-09 NOTE — PROGRESS NOTES
Progress Note    SUBJECTIVE:    Patient seen for f/u of Hypertensive urgency. She sitting up in bed alert and oriented no acute distress. Patient states her headache is resolved. Her blood pressure is better controlled. She is tolerating diet and activity well without issue. She denied chest pain or palpitations. She denies shortness of breath or cough. ROS:   Constitutional: negative  for fevers, and negative for chills. Respiratory: negative for shortness of breath, negative for cough, and negative for wheezing  Cardiovascular: negative for chest pain, and negative for palpitations  Gastrointestinal: negative for abdominal pain, negative for nausea,negative for vomiting, negative for diarrhea, and negative for constipation     All other systems were reviewed with the patient and are negative unless otherwise stated in HPI      OBJECTIVE:      Vitals:   Vitals:    08/09/21 0915   BP: (!) 119/43   Pulse: 63   Resp:    Temp:    SpO2:      Weight: 140 lb (63.5 kg)   Height: 5' 7\" (170.2 cm)   -----------------------------------------------------------------  Exam:    CONSTITUTIONAL:  awake, alert, cooperative, no apparent distress, and appears stated age  EYES:  Lids and lashes normal, pupils equal, round and reactive to light, extra ocular muscles intact, sclera clear, conjunctiva normal  ENT:  Normocephalic, without obvious abnormality, atraumatic, sinuses nontender on palpation, external ears without lesions, oral pharynx with moist mucus membranes, tonsils without erythema or exudates, gums normal and good dentition.   NECK:  supple, symmetrical, trachea midline, skin normal and no stridor  HEMATOLOGIC/LYMPHATICS:  no cervical lymphadenopathy and no supraclavicular lymphadenopathy  LUNGS:  No increased work of breathing, good air exchange, clear to auscultation bilaterally, no crackles or wheezing  CARDIOVASCULAR:  Normal apical impulse, regular rate and rhythm, normal S1 and S2, no S3 or S4, and no murmur noted  ABDOMEN:  No scars, normal bowel sounds, soft, non-distended, non-tender, no masses palpated, no hepatosplenomegally  MUSCULOSKELETAL:  there is no redness, warmth, or swelling of the joints  full range of motion noted  NEUROLOGIC: Moves all extremities equally, follows commands, no deficits  SKIN:  no bruising or bleeding, normal skin color, texture, turgor, no redness, warmth, or swelling and no rashes  EXT:     no cyanosis, clubbing or edema present    -----------------------------------------------------------------    Diagnostic Data:    · All available data reviewed  Lab Results   Component Value Date    WBC 6.3 08/08/2021    HGB 13.5 08/08/2021    .3 08/08/2021     08/08/2021      Lab Results   Component Value Date    GLUCOSE 100 (H) 08/08/2021    BUN 19 08/08/2021    CREATININE 0.74 08/08/2021     08/08/2021    K 3.9 08/08/2021    CALCIUM 9.2 08/08/2021     08/08/2021    CO2 23 08/08/2021       CTA HEAD NECK W CONTRAST   Final Result   1. Extensive calcified atherosclerotic plaque in the right carotid bulb   results in greater than 85% stenosis by NASCET criteria. 2. No additional hemodynamically significant stenosis in the cervical   arterial circulation. 3. No hemodynamically significant stenosis or large vessel occlusion of the   intracranial arterial circulation. XR CHEST PORTABLE   Final Result   1. Minimal linear opacities in each lung base likely due to scarring and/or   atelectasis. 2. Interstitial prominence potentially due to chronic changes and/or   pulmonary vascular congestion. Of note, there is potential for interstitial   edema near the bases. CT Head WO Contrast   Final Result   No acute findings in the head. ASSESSMENT / PLAN:  Hypertensive urgency  · Continue current therapy  · Continue Hyzaar and Adalat CC  · Ambulate  · Nutrition status:  Well developed, well nourished with no malnutrition  · DVT prophylaxis: Lovenox   · High risk medications: none   · Disposition:  Discharge plan is home today      YASMANY Zamudio - CNP , YASMANY, NP-C

## 2021-08-09 NOTE — PROGRESS NOTES
Discharge instructions reviewed with pt and daughter. All questions answered. Will escort pt to private car shortly.

## 2021-08-09 NOTE — PROGRESS NOTES
Comprehensive Nutrition Assessment    Type and Reason for Visit:  Initial    Nutrition Recommendations/Plan: continue current diet    Nutrition Assessment:  No significant PPES. Pt with good PO intakes. Currently asleep. Will attach low sodium diet information to d/c instructions. Malnutrition Assessment:  Malnutrition Status:  Insufficient data    Context:  Acute Illness     Findings of the 6 clinical characteristics of malnutrition:  Energy Intake:  Unable to assess  Weight Loss:  No significant weight loss     Body Fat Loss:  Unable to assess     Muscle Mass Loss:  Unable to assess    Fluid Accumulation:  No significant fluid accumulation     Strength:  Not Performed    Nutrition Related Findings:  appears thin in facial features      Wounds:  None       Current Nutrition Therapies:    ADULT DIET; Regular;  Low Sodium (2 gm)    Anthropometric Measures:  · Height: 5' 7\" (170.2 cm)  · Current Body Weight: 140 lb (63.5 kg)   · Admission Body Weight: 144 lb 3.2 oz (65.4 kg)    · Usual Body Weight: 140 lb (63.5 kg)     · Ideal Body Weight: 135 lbs; % Ideal Body Weight 103.7 %   · BMI: 21.9  · BMI Categories: Normal Weight (BMI 22.0 to 24.9) age over 72         Nutrition Diagnosis:   · No nutrition diagnosis at this time related to   as evidenced by        Nutrition Interventions:   Food and/or Nutrient Delivery:  Continue Current Diet  Nutrition Education/Counseling:  No recommendation at this time   Coordination of Nutrition Care:  No recommendation at this time    Goals:  PO > 75% of meals       Recent Labs     08/07/21  1140 08/08/21  0525    140   K 4.1 3.9    102   CO2 23 23   BUN 21 19   CREATININE 0.68 0.74   GLUCOSE 131* 100*   ALT 8 7   ALKPHOS 82 86   GFR                            Lab Results   Component Value Date    LABALBU 3.6 08/08/2021      Nutrition Monitoring and Evaluation:   Behavioral-Environmental Outcomes:  None Identified   Food/Nutrient Intake Outcomes:  Food and Nutrient Intake  Physical Signs/Symptoms Outcomes:  Biochemical Data, Weight     Discharge Planning:    Continue current diet     Electronically signed by Chales Bosworth, RD, LD on 8/9/21 at 9:46 AM EDT    Contact: [phone]

## 2021-08-09 NOTE — DISCHARGE SUMMARY
Discharge Summary    Ladarius Pan  :  1950  MRN:  [id number]    Admit date:  2021      Discharge date: 2021     Admitting Physician:  Judy Vitale MD    Discharge Diagnoses:    Principal Problem:    Hypertensive urgency  Active Problems:    Hypertension, uncontrolled  Resolved Problems:    * No resolved hospital problems. *      Hospital Course:   Ladarius Pan is a 78 y.o. female admitted with hypertensive urgency. She presented to the emergency room with complaints of not feeling well. She stated she had been out in the heat and walking to a restaurant and then back to her home after having a glass and a half of wine and stated that she felt very tired and started to fall asleep at the table. She reported that her  went to get her up and when she was walking she became dead weight and had passed out.  reported that it was a brief episode. Patient stated that she could not sit down and hit her head on a coffee table. She stated she was able to get back up and walk and go to sleep for the night. She stated that she woke up the next morning feeling dizzy and complained of a headache. She stated in the middle the night she woke up and her arm was shaking but denied any numbness or tingling. She denied weakness. She denied any speech difficulty including dysphasia. She denied chest pain or palpitations. She denied shortness of breath or cough. She denied recent illness including fever chills. She stated that currently she was on new medications to treat her blood pressure and that she had a significant cardiac work-up with Dr. Antonio Bowen. She stated that she is eating and drinking well. She denied any nausea vomiting or diarrhea. She was evaluated in the emergency room with initial systolic blood pressure of 210. Patient was started on Adalat CC 60 mg and was continued on her Hyzaar and tolerated this well.   Her blood pressure is significantly improved with a systolic blood pressure of 119 today. She was taken off the amlodipine and diltiazem as well as losartan. She will continue with the Adalat and the Hyzaar at this time and will follow up with her primary care as well as Dr. Chun Singh. Also on the CT a it found 85% stenosis in the right carotid artery. We will schedule her an outpatient appointment with Dr. Oglesby, vascular surgery to evaluate this. Patient is agreeable to this plan of care. Consultants:  none    Procedures: none    Complications: none    Discharge Condition: fair    Exam:  CONSTITUTIONAL:  awake, alert, cooperative, no apparent distress, and appears stated age  EYES:  Lids and lashes normal, pupils equal, round and reactive to light, extra ocular muscles intact, sclera clear, conjunctiva normal  ENT:  Normocephalic, without obvious abnormality, atraumatic, sinuses nontender on palpation, external ears without lesions, oral pharynx with moist mucus membranes, tonsils without erythema or exudates, gums normal and good dentition.   NECK:  supple, symmetrical, trachea midline, skin normal and no stridor  HEMATOLOGIC/LYMPHATICS:  no cervical lymphadenopathy and no supraclavicular lymphadenopathy  LUNGS:  No increased work of breathing, good air exchange, clear to auscultation bilaterally, no crackles or wheezing  CARDIOVASCULAR:  Normal apical impulse, regular rate and rhythm, normal S1 and S2, no S3 or S4, and no murmur noted  ABDOMEN:  No scars, normal bowel sounds, soft, non-distended, non-tender, no masses palpated, no hepatosplenomegally  MUSCULOSKELETAL:  there is no redness, warmth, or swelling of the joints  full range of motion noted  NEUROLOGIC: Moves all extremities equally, follows commands, no deficits  SKIN:  no bruising or bleeding, normal skin color, texture, turgor, no redness, warmth, or swelling and no rashes  EXT:      no cyanosis, clubbing or edema present      Significant Diagnostic Studies:   Lab Results   Component Value Date    WBC 6.3 08/08/2021    HGB 13.5 08/08/2021     08/08/2021       Lab Results   Component Value Date    BUN 19 08/08/2021    CREATININE 0.74 08/08/2021     08/08/2021    K 3.9 08/08/2021    CALCIUM 9.2 08/08/2021     08/08/2021    CO2 23 08/08/2021    LABGLOM >60 08/08/2021       Lab Results   Component Value Date    WBCUA 0 TO 2 08/07/2021    RBCUA 0 TO 2 08/07/2021    EPITHUA 0 TO 2 08/07/2021    LEUKOCYTESUR NEGATIVE 08/07/2021    SPECGRAV 1.015 08/07/2021    GLUCOSEU NEGATIVE 08/07/2021    KETUA NEGATIVE 08/07/2021    PROTEINU NEGATIVE 08/07/2021    HGBUR NEGATIVE 08/07/2021    CASTUA NOT REPORTED 08/07/2021    CRYSTUA NOT REPORTED 08/07/2021    BACTERIA NOT REPORTED 08/07/2021    YEAST NOT REPORTED 08/07/2021       CT Head WO Contrast    Result Date: 8/7/2021  EXAMINATION: CT OF THE HEAD WITHOUT CONTRAST 8/7/2021 10:58 am TECHNIQUE: CT of the head was performed without the administration of intravenous contrast. Dose modulation, iterative reconstruction, and/or weight based adjustment of the mA/kV was utilized to reduce the radiation dose to as low as reasonably achievable. COMPARISON: Head CT 05/04/2020 HISTORY: ORDERING SYSTEM PROVIDED HISTORY: fall head injury TECHNOLOGIST PROVIDED HISTORY: fall head injury Decision Support Exception - unselect if not a suspected or confirmed emergency medical condition->Emergency Medical Condition (MA) FINDINGS: BRAIN/VENTRICLES:  No masses nor acute intracranial hemorrhage. Intact gray/white matter differentiation without findings of acute ischemia. No mass effect nor midline shift. Patent basilar cisterns and foramen magnum. No hydrocephalus. Mild diffuse atrophy. Minimal deep and periventricular white matter hypodensities likely due to chronic small vessel ischemia. ORBITS:  Visualized portions appear normal without acute abnormality. SINUSES:  Hypoplastic bilateral frontal sinuses. Visualized portions otherwise appear normally pneumatized and aerated. SOFT TISSUES/SKULL:  No obvious acute soft tissue abnormality. Mild to moderate atherosclerotic calcifications. No acute fracture. No acute findings in the head. XR CHEST PORTABLE    Result Date: 8/7/2021  EXAMINATION: ONE XRAY VIEW OF THE CHEST 8/7/2021 11:28 am COMPARISON: Chest radiograph 06/28/2021 chest CTA 07/19/2018 HISTORY: ORDERING SYSTEM PROVIDED HISTORY: syncope TECHNOLOGIST PROVIDED HISTORY: syncope FINDINGS: Overlying bra artifact. Minimal linear opacities in each lung base. Diffuse interstitial prominence with indistinct pulmonary vasculature and suspected interlobular septal thickening near the bases. Persistent blunting of the right lateral costophrenic angle likely due to scarring. No definite findings of pneumothorax or pleural effusion. Normal mediastinal, hilar, and cardiac contours. Atherosclerotic calcification in the aorta. No acute fracture. 1. Minimal linear opacities in each lung base likely due to scarring and/or atelectasis. 2. Interstitial prominence potentially due to chronic changes and/or pulmonary vascular congestion. Of note, there is potential for interstitial edema near the bases. CTA HEAD NECK W CONTRAST    Result Date: 8/7/2021  EXAMINATION: CTA OF THE HEAD AND NECK WITH CONTRAST 8/7/2021 12:18 pm: TECHNIQUE: CTA of the head and neck was performed with the administration of intravenous contrast. Multiplanar reformatted images are provided for review. MIP images are provided for review. Stenosis of the internal carotid arteries measured using NASCET criteria. Dose modulation, iterative reconstruction, and/or weight based adjustment of the mA/kV was utilized to reduce the radiation dose to as low as reasonably achievable. 3D surface reformatted and curved MIP images were submitted for review. COMPARISON: None.  HISTORY: ORDERING SYSTEM PROVIDED HISTORY: Hypertensive urgency, headache, fall, loss of consciousness recently with normal cardiac echo and stress test concern for intracranial finding TECHNOLOGIST PROVIDED HISTORY: Hypertensive urgency, headache, fall, loss of consciousness recently with normal cardiac echo and stress test concern for intracranial finding Decision Support Exception - unselect if not a suspected or confirmed emergency medical condition->Emergency Medical Condition (MA) FINDINGS: CTA NECK: AORTIC ARCH/ARCH VESSELS: Aortic arch atherosclerotic calcifications. No dissection or arterial injury. No significant stenosis of the brachiocephalic or subclavian arteries. Mild stenosis in the proximal and distal left subclavian artery due to noncalcified plaque. CAROTID ARTERIES: Right side: Right common carotid artery is patent without focal stenosis. Eccentric calcified atherosclerotic plaque in the right carotid bulb and proximal ICA results in greater than 85% stenosis by NASCET criteria. Minimal luminal diameter of less than 1 mm. No dissection or aneurysm. Left side: Left common carotid artery is patent without focal stenosis. Eccentric calcified atherosclerotic plaque in the left carotid bulb without significant stenosis by NASCET criteria. No dissection or aneurysm. VERTEBRAL ARTERIES: No dissection, arterial injury, or significant stenosis. SOFT TISSUES: The lung apices are clear. No cervical or superior mediastinal lymphadenopathy. The larynx and pharynx are unremarkable. No acute abnormality of the salivary and thyroid glands. BONES: No acute osseous abnormality. CTA HEAD: ANTERIOR CIRCULATION: No significant stenosis of the intracranial internal carotid, anterior cerebral, or middle cerebral arteries. No aneurysm. Anterior communicating artery is present. POSTERIOR CIRCULATION: No significant stenosis of the vertebral, basilar, or posterior cerebral arteries. No aneurysm. Posterior communicating arteries are present. OTHER: No dural venous sinus thrombosis on this non-dedicated study.      1. Extensive calcified atherosclerotic plaque in the right carotid bulb results in greater than 85% stenosis by NASCET criteria. 2. No additional hemodynamically significant stenosis in the cervical arterial circulation. 3. No hemodynamically significant stenosis or large vessel occlusion of the intracranial arterial circulation. Assessment and Plan:  Patient Active Problem List    Diagnosis Date Noted    Hypertension, uncontrolled     Hypertensive urgency 08/07/2021    Bone marrow disorder 10/22/2018    Anemia 10/22/2018    Erosive gastritis 07/21/2018    Mild malnutrition ([facility] 07/20/2018    Acute upper GI bleeding - possible 07/20/2018    Acute abdominal pain 07/19/2018    PSVT (paroxysmal supraventricular tachycardia) ([facility] [address] 08/15/2017    Angina at rest [facility]) 08/15/2017    Nicotine abuse 08/15/2017        Discharge Medications:        Chapo Peres \"ANASTACIA\"   Home Medication Instructions [facility]:[id number]    Printed on:08/09/21 1200   Medication Information                      atorvastatin (LIPITOR) 10 MG tablet  Take 1 tablet by mouth daily             calcium carbonate (OSCAL) 500 MG TABS tablet  Take 1,200 mg by mouth daily             celecoxib (CELEBREX) 200 MG capsule  Take 200 mg by mouth 2 times daily             Cholecalciferol (VITAMIN D) 2000 units CAPS capsule  Take by mouth daily              losartan-hydroCHLOROthiazide (HYZAAR) 100-12.5 MG per tablet  Take 1 tablet by mouth daily             Multiple Vitamins-Minerals (THERAPEUTIC MULTIVITAMIN-MINERALS) tablet  Take 1 tablet by mouth daily             NIFEdipine (ADALAT CC) 60 MG extended release tablet  Take 1 tablet by mouth daily             oxybutynin (DITROPAN) 5 MG tablet  Take 5 mg by mouth 3 times daily             PARoxetine (PAXIL) 20 MG tablet  Take 20 mg by mouth every morning             tiZANidine (ZANAFLEX) 4 MG tablet  Take 4 mg by mouth 3 times daily             topiramate (TOPAMAX) 25 MG tablet  TAKE 2 TABLETS EVERY NIGHT             valACYclovir (VALTREX) 500 MG tablet  Take 1,000 mg by mouth 2 times daily as needed              vitamin B-12 (CYANOCOBALAMIN) 1000 MCG tablet  Take 1,000 mcg by mouth daily                 Patient Instructions:    Activity: activity as tolerated  Diet: cardiac diet  Wound Care: none needed  Other: None    Disposition:   Discharge to Home    Follow up:  Patient will be followed by Viraj Smith MD in 1-2 weeks; Dr. Mer Yarbrough in 1 week; Dr. Raul Ortiz vascular surgery    CORE MEASURES on Discharge (if applicable)  ACE/ARB in CHF: NA  Statin in MI: NA  ASA in MI: NA  Statin in CVA: NA  Antiplatelet in CVA: NA    Total time spent on discharge services: 40 minutes    Including the following activities:  Evaluation and Management of patient  Discussion with patient and/or surrogate about current care plan  Coordination with Case Management and/or   Coordination of care with Consultants (if applicable)   Coordination of care with Receiving Facility Physician (if applicable)  Completion of DME forms (if applicable)  Preparation of Discharge Summary  Preparation of Medication Reconciliation  Preparation of Discharge Prescriptions    Signed:  YASMANY Mcclure CNP, YASMANY, NP-C  8/9/2021, 12:07 PM

## 2021-08-09 NOTE — PROGRESS NOTES
Patient is resting in bed with eyes closed. Respirations are regular and even and patient shows no signs of distress. Will continue to monitor and assess.

## 2021-08-11 NOTE — PROGRESS NOTES
Kane Summers was seen on 8/11/2021 for   Chief Complaint   Patient presents with    New Patient     found carotid artery is blocked   .                             REVIEW OF SYSTEMS    Constitutional Weight: absent, A, Fatigue: present Fever: absent   HEENT Ears: normal,Visual disturbance: absent Sore throat: absent,    Respiratory Shortness of breath: absent, Cough: present;, Snoring: present   Cardivascular Chest pain: present,  Leg pain: absent,Leg swelling:absent, Non-healing wound:absent    GI Diarrhea: absent, Abdominal Pain: absent    Urinary frequency: absent, Urinary incontinence: absent   Musculoskeletal Neck pain: absent, Back pain: present, Restless Leg:absent     Dermatological Hair loss: absent, Skin changes: absent   Neurological  Sudden Loss of Vision in one eye:absent, Slurred Speech:absent, Weakness on one side of body: absent,Headache: present   Psychiatric Anxiety: absent, Depression: absent   Hematologic Abnormal bleeding: absent  ,

## 2021-08-11 NOTE — PROGRESS NOTES
Lupe Tolbert was seen on 2021 for   Chief Complaint   Patient presents with    New Patient     found carotid artery is blocked   . 21 1st [facility] visit. PCP is Nghia Carreon. Referred to me after a stay at [facility]. Had a fall, LOC (while in [place]). Felt \"funny\". Was wondering if she had a stroke. Head CT was nl, but neck CTA showed 21 mindy > 85% stenosis. Has had some orientation difficulty. Smoked until last month. Some foods cause pain. Voluntary weight loss from 190's to 130's. No real food fear. 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 19 duplex celliac 470, sma 409. No claudication. CT 18 Showed 60% rcia. She reports vision changes in her right eye, but has also had right eye surgery. Sees Dr Geri Handley. PVCs, AVNRT. Cr 0.74  Hx of HTN (as high as 187 systolic)  No asa  Med list includes lipitor. Social History     Socioeconomic History    Marital status:      Spouse name: Not on file    Number of children: Not on file    Years of education: Not on file    Highest education level: Not on file   Occupational History    Not on file   Tobacco Use    Smoking status: Former Smoker     Packs/day: 1.00     Years: 6.00     Pack years: 6.00     Types: Cigarettes     Quit date: 2021     Years since quittin.1    Smokeless tobacco: Never Used   Vaping Use    Vaping Use: Never used   Substance and Sexual Activity    Alcohol use: Yes     Comment: 3 or 4 glasses with dinner    Drug use: No    Sexual activity: Not on file   Other Topics Concern    Not on file   Social History Narrative    Not on file     Social Determinants of Health     Financial Resource Strain:     Difficulty of Paying Living Expenses:    Food Insecurity:     Worried About [address] in the Last Year:     [address] in the Last Year:    Transportation Needs:     Lack of Transportation (Medical):      Lack of Transportation (Non-Medical):    Physical Activity:     Days of Exercise per Week:     Minutes of Exercise per Session:    Stress:     Feeling of Stress :    Social Connections:     Frequency of Communication with Friends and Family:     Frequency of Social Gatherings with Friends and Family:     Attends Manda[employer] Services:     Active Member of Clubs or Organizations:     Attends Club or Organization Meetings:     Marital Status:    Intimate Partner Violence:     Fear of Current or Ex-Partner:     Emotionally Abused:     Physically Abused:     Sexually Abused:      Family History   Problem Relation Age of Onset    Lupus Mother     Cancer Father         leukemia    Brain Cancer Sister      Past Medical History:   Diagnosis Date    Abnormal patient-activated cardiac event monitor 09/06/2017    sinus tachycardia with PSVT lasting 36 sec @ 165 bpm.    Cataract     H/O echocardiogram 08/15/2017    EF 60%. Mildly increased LV wall thickness normal LV cavity size. No definite specific wall motion abnormalities were identified. No significant valvular abnormalities Mild diastolic dysfunction is seen.     Hypertension     Primary cancer of bone marrow (HCC)      Current Outpatient Medications   Medication Sig Dispense Refill    atorvastatin (LIPITOR) 10 MG tablet Take 1 tablet by mouth daily 90 tablet 3    topiramate (TOPAMAX) 25 MG tablet TAKE 2 TABLETS EVERY NIGHT 180 tablet 1    celecoxib (CELEBREX) 200 MG capsule Take 200 mg by mouth 2 times daily      tiZANidine (ZANAFLEX) 4 MG tablet Take 4 mg by mouth 3 times daily      vitamin B-12 (CYANOCOBALAMIN) 1000 MCG tablet Take 1,000 mcg by mouth daily      valACYclovir (VALTREX) 500 MG tablet Take 1,000 mg by mouth 2 times daily as needed       Cholecalciferol (VITAMIN D) 2000 units CAPS capsule Take by mouth daily       oxybutynin (DITROPAN) 5 MG tablet Take 5 mg by mouth 3 times daily      calcium carbonate (OSCAL) 500 MG TABS tablet Take 1,200 mg by mouth daily      Multiple Vitamins-Minerals (THERAPEUTIC MULTIVITAMIN-MINERALS) tablet Take 1 tablet by mouth daily      PARoxetine (PAXIL) 20 MG tablet Take 20 mg by mouth every morning      NIFEdipine (ADALAT CC) 60 MG extended release tablet Take 1 tablet by mouth daily (Patient not taking: Reported on 8/11/2021) 30 tablet 3    losartan-hydroCHLOROthiazide (HYZAAR) 100-12.5 MG per tablet Take 1 tablet by mouth daily (Patient not taking: Reported on 8/11/2021) 90 tablet 0     No current facility-administered medications for this visit. Physical findings:  General- no acute distress, oriented  HEENT - no xanthelasma, external ears normal  Neck- Supple, no thyromegaly  Carotids - right carotid bruits, no left  Lungs - decreased breath sounds  CV- Regular rate and rythym, no murmurs rubs or gallops  Abdomen - Non tender, non distended, no bruits  Skin- warm, dry, no skin breakdown or gangrene  Extremities - no edema    Pulses Right - Radial 2+  Posterior tibial:    absent  Dorsalis pedis:  absent     Pulses Left -Radial 2+  Posterior tibial: 2+  Dorsalis pedis:  2+      Assessment:  Encounter Diagnosis   Name Primary?  Bilateral carotid artery stenosis Yes   Severe right carotid stenosis. She has had some orientation difficulty, LOC, and right eye sx, although the latter appear to be related to prior eye surgery  Discussed in detail risk of stroke, need for medical therapy including no smoking and bp control, and that treatment options include both surgery and stenting. 45 min chart review and pt encounter  Plan of care: Will arrange for arch and carotids, with possible carotid stent   Will start asa, plavix, continue bp meds and no smoking, as well as lipitor. Will plan for September 7  Follow up and evaluate.        Electronically signed by Delgado Almeida MD on 8/11/21 at 1:19 PM EDT

## 2021-08-11 NOTE — PATIENT INSTRUCTIONS
SURVEY:    You may be receiving a survey from [email or web address] regarding your visit today. Please complete the survey to enable us to provide the highest quality of care to you and your family. If you cannot score us a very good on any question, please call the office to discuss how we could have made your experience a very good one. Thank you.

## 2021-08-20 PROBLEM — R00.1 BRADYCARDIA: Status: ACTIVE | Noted: 2021-01-01

## 2021-08-20 PROBLEM — I65.29 STENOSIS OF CAROTID ARTERY: Status: ACTIVE | Noted: 2021-01-01

## 2021-08-20 PROBLEM — E86.1 HYPOTENSION DUE TO HYPOVOLEMIA: Status: ACTIVE | Noted: 2021-01-01

## 2021-08-20 PROBLEM — I95.89 HYPOTENSION DUE TO HYPOVOLEMIA: Status: ACTIVE | Noted: 2021-01-01

## 2021-08-20 PROBLEM — R42 DIZZINESS: Status: ACTIVE | Noted: 2021-01-01

## 2021-08-20 NOTE — PROGRESS NOTES
Orthos completed. Did have a funny like feeling for a couple of seconds going from a laying to sitting position. Patient using restroom at this time.

## 2021-08-20 NOTE — ED PROVIDER NOTES
[facility] ED  EMERGENCY DEPARTMENT ENCOUNTER      Pt Rashad Code  MRN: [id number]  Birthdate 1950  Date of evaluation: 8/20/2021  Provider: Kavon Jeronimo MD    CHIEF COMPLAINT     Chief Complaint   Patient presents with    Hypertension     ongoing for months; sent from Dr. Osorio Port office    Dizziness     with standing         HISTORY OF PRESENT ILLNESS   (Location/Symptom, Timing/Onset, Context/Setting, Quality, Duration, Modifying Factors, Severity)  Note limiting factors. HPI the patient is a 60-year-old female who presents with hypotension when sitting up or standing. She has bradycardia. She has first-degree heart block. She is not having any pain. No nausea or vomiting. No fever or chills. Nursing Notes were reviewed. REVIEW OF SYSTEMS    (2-9 systems for level 4, 10 or more for level 5)     Review of Systems   Constitutional: Positive for activity change. HENT: Negative for congestion. Eyes: Negative for visual disturbance. Respiratory: Negative for cough, shortness of breath and wheezing. Cardiovascular: Negative for chest pain, palpitations and leg swelling. Gastrointestinal: Negative for abdominal distention, abdominal pain, constipation, diarrhea, nausea and vomiting. Genitourinary: Negative for dysuria. Musculoskeletal: Positive for gait problem. Negative for back pain. Skin: Negative. Neurological: Positive for dizziness and light-headedness. Negative for tremors, seizures, syncope, facial asymmetry, speech difficulty, weakness, numbness and headaches. Psychiatric/Behavioral: Negative for confusion, decreased concentration and dysphoric mood. MEDICAL HISTORY     Past Medical History:   Diagnosis Date    Abnormal patient-activated cardiac event monitor 09/06/2017    sinus tachycardia with PSVT lasting 36 sec @ 165 bpm.    Cataract     H/O echocardiogram 08/15/2017    EF 60%.  Mildly increased LV wall thickness normal LV cavity size. No definite specific wall motion abnormalities were identified. No significant valvular abnormalities Mild diastolic dysfunction is seen.     Hypertension     Primary cancer of bone marrow [facility])          SURGICAL HISTORY       Past Surgical History:   Procedure Laterality Date    CARDIAC CATHETERIZATION Left 08/16/2017    left radial/[facility]/ [address] [facility]    UPPER GASTROINTESTINAL ENDOSCOPY N/A 7/21/2018    EGD BIOPSY performed by Boone Hernadez MD at [facility]       Current Discharge Medication List      CONTINUE these medications which have NOT CHANGED    Details   clopidogrel (PLAVIX) 75 MG tablet Take 1 tablet by mouth daily  Qty: 90 tablet, Refills: 3      losartan-hydroCHLOROthiazide (HYZAAR) 100-12.5 MG per tablet Take 1 tablet by mouth daily  Qty: 90 tablet, Refills: 0      atorvastatin (LIPITOR) 10 MG tablet Take 1 tablet by mouth daily  Qty: 90 tablet, Refills: 3    Associated Diagnoses: Mixed hyperlipidemia      topiramate (TOPAMAX) 25 MG tablet TAKE 2 TABLETS EVERY NIGHT  Qty: 180 tablet, Refills: 1      celecoxib (CELEBREX) 200 MG capsule Take 200 mg by mouth 2 times daily      tiZANidine (ZANAFLEX) 4 MG tablet Take 4 mg by mouth 3 times daily      vitamin B-12 (CYANOCOBALAMIN) 1000 MCG tablet Take 1,000 mcg by mouth daily      valACYclovir (VALTREX) 500 MG tablet Take 1,000 mg by mouth 2 times daily as needed       Cholecalciferol (VITAMIN D) 2000 units CAPS capsule Take by mouth daily       oxybutynin (DITROPAN) 5 MG tablet Take 5 mg by mouth 3 times daily      calcium carbonate (OSCAL) 500 MG TABS tablet Take 1,200 mg by mouth daily      Multiple Vitamins-Minerals (THERAPEUTIC MULTIVITAMIN-MINERALS) tablet Take 1 tablet by mouth daily      PARoxetine (PAXIL) 20 MG tablet Take 20 mg by mouth every morning             ALLERGIES     Coricidin hbp cold-flu [chlorpheniramine-acetaminophen]    FAMILY HISTORY       Family History Problem Relation Age of Onset    Lupus Mother     Cancer Father         leukemia    Brain Cancer Sister           SOCIAL HISTORY       Social History     Socioeconomic History    Marital status:      Spouse name: Not on file    Number of children: Not on file    Years of education: Not on file    Highest education level: Not on file   Occupational History    Not on file   Tobacco Use    Smoking status: Former Smoker     Packs/day: 1.00     Years: 6.00     Pack years: 6.00     Types: Cigarettes     Quit date: 2021     Years since quittin.1    Smokeless tobacco: Never Used   Vaping Use    Vaping Use: Never used   Substance and Sexual Activity    Alcohol use: Yes     Comment: 3 or 4 glasses with dinner    Drug use: No    Sexual activity: Not on file   Other Topics Concern    Not on file   Social History Narrative    Not on file     Social Determinants of Health     Financial Resource Strain:     Difficulty of Paying Living Expenses:    Food Insecurity:     Worried About Running Out of Food in the Last Year:     Ran Out of Food in the Last Year:    Transportation Needs:     Lack of Transportation (Medical):      Lack of Transportation (Non-Medical):    Physical Activity:     Days of Exercise per Week:     Minutes of Exercise per Session:    Stress:     Feeling of Stress :    Social Connections:     Frequency of Communication with Friends and Family:     Frequency of Social Gatherings with Friends and Family:     Attends Islam Services:     Active Member of Clubs or Organizations:     Attends Club or Organization Meetings:     Marital Status:    Intimate Partner Violence:     Fear of Current or Ex-Partner:     Emotionally Abused:     Physically Abused:     Sexually Abused:        SCREENINGS             PHYSICAL EXAM  (up to 7 for level 4, 8 or more for level 5)     ED Triage Vitals [21 1145]   BP Temp Temp Source Pulse Resp SpO2 Height Weight   (!) 88/44 96.8 °F (36 °C) Tympanic 52 16 99 % 5' 6\" (1.676 m) 140 lb (63.5 kg)       Physical Exam  Constitutional:       General: She is not in acute distress. Appearance: Normal appearance. She is normal weight. HENT:      Head: Normocephalic and atraumatic. Eyes:      Extraocular Movements: Extraocular movements intact. Conjunctiva/sclera: Conjunctivae normal.      Pupils: Pupils are equal, round, and reactive to light. Cardiovascular:      Rate and Rhythm: Regular rhythm. Bradycardia present. Pulses: Normal pulses. Heart sounds: Normal heart sounds. Pulmonary:      Effort: Pulmonary effort is normal.      Breath sounds: Normal breath sounds. Abdominal:      General: Abdomen is flat. Bowel sounds are normal.      Palpations: Abdomen is soft. Musculoskeletal:         General: No swelling or tenderness. Normal range of motion. Cervical back: Normal range of motion and neck supple. Skin:     General: Skin is warm and dry. Neurological:      General: No focal deficit present. Mental Status: She is alert and oriented to person, place, and time. Mental status is at baseline. Psychiatric:         Mood and Affect: Mood normal.         Behavior: Behavior normal.         Thought Content:  Thought content normal.         Judgment: Judgment normal.         DIAGNOSTIC RESULTS     EKG: All EKG's are interpreted by the Emergency Department Physician whoeither signs or Co-signs this chart in the absence of a cardiologist.      RADIOLOGY:   Non-plain film images such as CT, Ultrasound and MRI are read by the radiologist. Plain radiographic images are visualized and preliminarily interpreted by the emergency physician     Interpretation per the Radiologist below, if available at the time of this note:    XR CHEST 1 VIEW   Final Result   Stable chest      Persistent mild diffuse prominence of interstitial markings and chronic   blunting of the right costophrenic angle, findings likely related to interstitial fibrosis      No acute cardiopulmonary findings               ED BEDSIDE ULTRASOUND:   Performed by ED Physician - none    LABS:  Labs Reviewed   CBC WITH AUTO DIFFERENTIAL - Abnormal; Notable for the following components:       Result Value    RBC 3.19 (*)     Hemoglobin 10.9 (*)     Hematocrit 33.8 (*)     .0 (*)     MCH 34.2 (*)     Lymphocytes 48 (*)     All other components within normal limits   COMPREHENSIVE METABOLIC PANEL - Abnormal; Notable for the following components:    BUN 34 (*)     CREATININE 0.96 (*)     Bun/Cre Ratio 35 (*)     Albumin/Globulin Ratio 0.7 (*)     GFR Non- 58 (*)     All other components within normal limits   TROPONIN       EMERGENCY DEPARTMENT COURSE and DIFFERENTIAL DIAGNOSIS/MDM:   Vitals:    Vitals:    08/20/21 1300 08/20/21 1307 08/20/21 1315 08/20/21 1330   BP: (!) 151/39  (!) 141/59 134/80   Pulse: (!) 48 (!) 46 53 55   Resp: 15 18 19 15   Temp:       TempSrc:       SpO2: 93% 95% 94% 97%   Weight:       Height:               MDM    CONSULTS:  None    PROCEDURES:  Unless otherwise noted below, none     Procedures    FINAL IMPRESSION      1. Dizziness    2. Bradycardia    3. Hypotension due to hypovolemia    4.  Stenosis of carotid artery, unspecified laterality          DISPOSITION/PLAN   DISPOSITION Decision To Discharge 08/20/2021 03:21:58 PM      PATIENT REFERRED TO:  Jaison Duffy MD  [facility]   [id number] [phone]      As set up      DISCHARGE MEDICATIONS:  Current Discharge Medication List                 (Please note that portions ofthis note were completed with a voice recognition program.  Efforts were made to edit the dictations but occasionally words are mis-transcribed.)      Michael Box MD (electronically signed)  Attending Emergency Physician          Leslie Nolasco MD  08/20/21 [id number]

## 2021-08-20 NOTE — CONSULTS
Roscoe Del Real am scribing for and in the presence of Simeon Eaton MD, F.A.C.C..    Subjective:     CHIEF COMPLAINT / HPI:    Chief Complaint   Patient presents with    Hypertension     ongoing for months; sent from Dr. Roddy Fuller office    Dizziness     with standing     Dear Dr Jenni Alejandre is 78 y.o. female who presents today for follow-up. Patient initially seen in the hospital on 8/15/2017 because of chest pain. 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 Patient converted to normal sinus rhythm spontaneously. Risk factors for CAD include hypertension and current smoking. She underwent cardiac catheterization on 8/16/2017 which showed no significant epicardial CAD. She was discharged with an event monitor. She had an episode of paroxysmal supraventricular tachycardia lasting for 35 seconds at a heart rate of 165 bpm recorded. 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 Her metoprolol changed to diltiazem 120 mg daily. Ms. Surendra Leigh had hospital admission 7/19/2018 for GI problems and is unable to take aspirin due to GI bleed. Echo done 7/13/2021-ejection fraction 60%. Mild LVH. Mild mitral regurgitation mild diastolic dysfunction. She was admitted to the hospital on 8/7/2021 because of hypertension emergency and syncope. CT of the head and neck showed 85% stenosis of the left carotid bulb. She has seen Dr. Greta Fenton and planning to have right carotid artery stenting in [place] next week. Ms. Surendra Leigh came to office today for hospital follow up and felt severely dizzy so was sent to ER due to hypoetnsion. She reports her medications where changed recently. Her  says 2 days ago she fell while on the phone and he needed to help her to bed. Her symptoms come and go and are off and on. She seen Dr Tatianna Kirk and is having option carotid angiography with possible carotid stent .       CAM monitor on 7/13/2021: Baseline rhythm is sinus with average heart rate of 60 bpm ranging between 31 and 116 bpm.  5 episodes of atrial tachycardia, the longest was 9 beats at a heart rate of 109 bpm up to 125 bpm.  Rare isolated PVCs noted. No significant pauses. No patient activated events. She brought her heart rate and blood pressure log. Blood pressure is very well controlled at home but she has occasional readings as low as 92 mmHg systolic. She thinks she is getting more dizzy because of the Adalat that was added during her last hospital stay. I reviewed the chest x-ray and the blood work that was done in the emergency room. Blood work is stable with mild anemia. Chest x-ray is consistent with chronic interstitial markings concerning for interstitial fibrosis, no acute abnormalities. Kidney function stable. Troponin is negative x1. ECG showed sinus bradycardia, no acute ischemic changes. Past Medical History:    Past Medical History:   Diagnosis Date    Abnormal patient-activated cardiac event monitor 2017    sinus tachycardia with PSVT lasting 36 sec @ 165 bpm.    Cataract     H/O echocardiogram 08/15/2017    EF 60%. Mildly increased LV wall thickness normal LV cavity size. No definite specific wall motion abnormalities were identified. No significant valvular abnormalities Mild diastolic dysfunction is seen.     Hypertension     Primary cancer of bone marrow [facility])      Past Surgical History:  Past Surgical History:   Procedure Laterality Date    CARDIAC CATHETERIZATION Left 2017    left radial/[facility]/Dr Garcia    HYSTERECTOMY      UPPER GASTROINTESTINAL ENDOSCOPY N/A 2018    EGD BIOPSY performed by Lucrecia Cast MD at [address] [place] [address] History:    Social History     Tobacco Use   Smoking Status Former Smoker    Packs/day: 1.00    Years: 6.00    Pack years: 6.00    Types: Cigarettes    Quit date: 2021    Years since quittin.1   Smokeless Tobacco Never Used     Current Medications:  No outpatient medications have been marked as taking for the 8/20/21 encounter [facility] Encounter). Plavix 75 mg daily. Losartan hydrochlorothiazide 100/12 0.5 mg daily. Topamax 25 mg daily. Lipitor 10 mg daily. Paxil 20 mg daily. REVIEW OF SYSTEMS:    CONSTITUTIONAL: No major weight gain or loss, fatigue, weakness, night sweats or fever. HEENT: No new vision difficulties or ringing in the ears. RESPIRATORY: see HPI. CARDIOVASCULAR: See HPI  GI: No nausea, vomiting, diarrhea, constipation, abdominal pain or changes in bowel habits. : No urinary frequency, urgency, incontinence hematuria or dysuria. SKIN: No cyanosis or skin lesions. MUSCULOSKELETAL: No new muscle or joint pain. Scoliosis noticed on Exam.  NEUROLOGICAL: No syncope or TIA-like symptoms. PSYCHIATRIC: No anxiety, pain, insomnia or depression    Objective:     PHYSICAL EXAM:      VITALS:  /80   Pulse 55   Temp 96.8 °F (36 °C) (Tympanic)   Resp 15   Ht 5' 6\" (1.676 m)   Wt 140 lb (63.5 kg)   SpO2 97%   BMI 22.60 kg/m²   CONSTITUTIONAL: Cooperative, no apparent distress, and appears well nourished / developed. NEUROLOGIC:  Awake and orientated to person, place and time. PSYCH: Calm affect. SKIN: Warm and dry. HEENT: Sclera non-icteric, normocephalic, neck supple, no elevation of JVP, normal carotid pulses with no bruits and thyroid normal size. LUNGS:  No increased work of breathing and clear to auscultation, no crackles or wheezing. Cardiovascular: Bradycardic rate, regular rhythm, normal heart sounds. Exam reveals no gallop and no friction rubs. Short apical systolic murmur noted. .  ABDOMEN:  Normal bowel sounds, non-distended and non-tender to palpation. Extremities: No significant edema. No cyanosis or clubbing. 2+ radial and carotid pulses. Distal extremity pulses: 2+ bilaterally.       DATA:    Lab Results   Component Value Date    ALT 13 08/20/2021    AST 17 08/20/2021    ALKPHOS 70 08/20/2021    BILITOT 0.47 08/20/2021 Lab Results   Component Value Date    CREATININE 0.96 (H) 08/20/2021    BUN 34 (H) 08/20/2021     08/20/2021    K 4.5 08/20/2021    CL 98 08/20/2021    CO2 25 08/20/2021     Lab Results   Component Value Date    TSH 1.88 06/28/2021     Lab Results   Component Value Date    WBC 4.7 08/20/2021    HGB 10.9 (L) 08/20/2021    HCT 33.8 (L) 08/20/2021    .0 (H) 08/20/2021     08/20/2021       Lab Results   Component Value Date    TRIG 46 06/28/2021    TRIG 65 07/13/2020    TRIG 89 08/16/2017     Lab Results   Component Value Date    HDL 63 06/28/2021    HDL 60 07/13/2020    HDL 57 08/16/2017     Lab Results   Component Value Date    LDLCHOLESTEROL 22 06/28/2021    LDLCHOLESTEROL 29 07/13/2020    LDLCHOLESTEROL 33 08/16/2017       Assessment:   History of supraventricular tachycardia. History of syncope. Right carotid artery stenosis. Labile hypertension. Recurrent dizziness and lightheadedness. Dyslipidemia. Plan:   · Paroxysmal Supraventricular Tachycardia: Rate Control   · Frequent PVCs. · She continues to complain of intermittent episodes of palpitations. · ECG  In 7/20/2021 showing sinus rhythm with frequent PVCs, PVCs morphology suggest RVOT origin. · I did explain to her that this type of abnormal heart rhythm is common in a structurally normal heart but we need to get further information particularly looking at the PVCs burden and the occurrence of ventricular tachycardia. · I reviewed the most recent heart monitor with her, rare isolated PVCs noted. Very short runs of ectopic atrial tachycardia as above. No significant bradycardia or pauses. · I told her that since her most recent syncopal episode and recurrent dizziness can be secondary to transient ischemia from right carotid artery stenosis then we will proceed with right carotid artery stenting before jumping into pacemaker therapy.   · Patient has no further syncopal episodes since discharge from the hospital.  She is keeping a log of her heart rate and blood pressure and they are more or less fairly controlled. · She think that her Adalat is causing more dizziness. She is on a low-dose anyway, I will hold Adalat for now and I asked her to continue monitor heart rate and blood pressure and call my cell phone number if she has any concern about her heart rate and blood pressure log. · Counseling: I advised Ms. Sharma to call our office or go to the emergency room if she develops worsening or persistent chest pain or shortness of breath as this could be life threatening. Atherosclerotic Heart Disease: Mild, nonobstructive CAD by cardiac cath in August 2017. Antiplatelet Agent: History of intolerance to aspirin secondary to GI bleeding. Continue Plavix 75 mg daily. Statin Therapy: Continue atorvastatin (Lipitor) 10 mg nightly. .    Multiple risk factors for CAD including hypertension, dyslipidemia and tobacco abuse. Her LDL at goal.  Follow-up stress test.  This can be done after her carotid artery stenting. Patient denies any chest pain or worsening shortness of breath. · Hyperlipidemia: Mixed   · Cholesterol Reduction Therapy: Continue Atorvastatin (Lipitor) 10 mg daily       · Essential Hypertension: Controlled   Discussed endorgan damage from high blood pressure.  Discussed the importance of controlling her blood pressure and monitoring her blood pressure at home.  Beta Blocker: Contraindicated because of bradycardia.  ACE Inibitor/ARB: Continue losartan/HCTZ (Hyzaar) 100/12.5 mg every morning.  Stop Nifedipine for now. · Tobacco Abuse Counseling:   · Is using gum trying to quit.  Recurrent intermittent palpitations: Rate Control Symptomatic  · Beta Blocker: Not indicated at this time. · Calcium Channel Blocker: Not indicated at this time. · carotid stenosis: Continue Plavix, Lipitor and antihypertensive therapy. ·  Follow up with Dr Cholo Soriano as scheduled.      Finally, I recommended that she continue her other medications and follow up with you as previously scheduled. Follow up:   I told Ms. Sharma to call my office if she had any problems, but otherwise told her to in 2 weeks however, I would be happy to see her sooner should the need arise. Sincerely,   Keli Pratt MD, MS, F.A.C.C. [facility]) [facility], [address] [place] [address], [address]  Phone: [phone], Fax: [phone]    I believe that the risk of significant morbidity and mortality related to the patient's current medical conditions are: intermediate-high. The documentation recorded by the scribe, accurately and completely reflects the services I personally performed and the decisions made by me. Keli Pratt MD, F.A.C.C.  August 20, 2021

## 2021-09-02 NOTE — PATIENT INSTRUCTIONS
SURVEY:    You may be receiving a survey from [employer] regarding your visit today. Please complete the survey to enable us to provide the highest quality of care to you and your family. If you cannot score us a very good on any question, please call the office to discuss how we could have made your experience a very good one. Thank you.

## 2021-09-02 NOTE — PROGRESS NOTES
Lex Campos am scribing for and in the presence of Sabine Mae MD, F.A.C.C..    Subjective:     Edie Ferro / HPI:    Chief Complaint   Patient presents with    Follow-up     Hx: PSVT, ASHD. pt is here for hospital f/u. her blood pressure has been up and down, has had a couple falls, she was dizzy at the time, doesnt think she lost consciousness. She has a constant headache. next tuesday she has right carotid surgery with Dr Dorina Brian. Denies: CP, SOB, palps    Other     multiple falls, once time on her patio and she thinks she tripped carrying too much stuff and she fell. 3 nights ago she fell again on the patio, she was lightheaded, she said it was hot and she had wine at that time. nobody saw her do this. Dear Dr Avery Schmid is 78 y.o. female who presents today for follow-up. Patient initially seen in the hospital on 8/15/2017 because of chest pain. 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 Patient converted to normal sinus rhythm spontaneously. Risk factors for CAD include hypertension and current smoking. She underwent cardiac catheterization on 8/16/2017 which showed no significant epicardial CAD. She was discharged with an event monitor. She had an episode of paroxysmal supraventricular tachycardia lasting for 35 seconds at a heart rate of 165 bpm recorded. 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 Her metoprolol changed to diltiazem 120 mg daily. Ms. Markell Burk had hospital admission 7/19/2018 for GI problems and is unable to take aspirin due to GI bleed. ECG reviewed, sinus rhythm with frequent monomorphic PVCs. PVCs morphology suggests RVOT origin. Ms. Markell Burk is here today for a hospital follow up. She was admitted to the hospital because of hypertension urgency. Started on Cardene drip and later discharged on nifedipine 60 mg daily.   Because of recurrent dizziness, falls and low blood pressure at home we ended up stopping nifedipine and changing her losartan to losartan hydrochlorothiazide 100/12.5 mg daily. She reported having to further falling/passing out episodes. According to her son at bedside, they both happened in hot days and after drinking wine. He does think that this is a very big contributing factor. She does not to take it easy standing and she is cutting back on alcohol intake and she is feeling good overall. She has occasional dizziness and her blood pressure is all over the place according to her home blood pressure log. Some readings are in the low 90s millimeters mercury systolic and she reported having dizziness and lightheadedness during these episodes. She did not take her blood pressure medications this morning. She says she forgot to take it and her blood pressure is elevated. She denies any chest pain, pressure or tightness. No significant shortness of breath. She said she stayed fairly active without significant exertional symptoms. She reported having poor sleep for the past 2 weeks and she was wondering if any of her medications would cause the symptoms. Apparently no new medications added within this timeframe. She takes Zanaflex regularly every 8 hours. She takes Topamax every night and takes Paxil in the morning. She reported a little bit of her memory problem. Her son said she can ask him which day is this several times in few minutes. No family history of dementia. She has had her parents  at younger age. Past Medical History:    Past Medical History:   Diagnosis Date    Abnormal patient-activated cardiac event monitor 2017    sinus tachycardia with PSVT lasting 36 sec @ 165 bpm.    Cataract     H/O echocardiogram 08/15/2017    EF 60%. Mildly increased LV wall thickness normal LV cavity size. No definite specific wall motion abnormalities were identified. No significant valvular abnormalities Mild diastolic dysfunction is seen.     Hypertension     Primary cancer of bone marrow [facility])      Past Surgical History:  Past Surgical History:   Procedure Laterality Date    CARDIAC CATHETERIZATION Left 2017    left radial/[facility]/Dr Garcia    HYSTERECTOMY      UPPER GASTROINTESTINAL ENDOSCOPY N/A 2018    EGD BIOPSY performed by Ed Chavira MD at [address] [place] [address] History:    Social History     Tobacco Use   Smoking Status Former Smoker    Packs/day: 1.00    Years: 6.00    Pack years: 6.00    Types: Cigarettes    Quit date: 2021    Years since quittin.1   Smokeless Tobacco Never Used     Current Medications:  Outpatient Medications Marked as Taking for the 21 encounter (Office Visit) with Sheron Martinez MD   Medication Sig Dispense Refill    b complex vitamins capsule Take 1 capsule by mouth daily      aspirin 81 MG EC tablet Take 81 mg by mouth daily      clopidogrel (PLAVIX) 75 MG tablet Take 1 tablet by mouth daily 90 tablet 3    losartan-hydroCHLOROthiazide (HYZAAR) 100-12.5 MG per tablet Take 1 tablet by mouth daily 90 tablet 0    atorvastatin (LIPITOR) 10 MG tablet Take 1 tablet by mouth daily 90 tablet 3    topiramate (TOPAMAX) 25 MG tablet TAKE 2 TABLETS EVERY NIGHT 180 tablet 1    celecoxib (CELEBREX) 200 MG capsule Take 200 mg by mouth 2 times daily      tiZANidine (ZANAFLEX) 4 MG tablet Take 4 mg by mouth 3 times daily      valACYclovir (VALTREX) 500 MG tablet Take 1,000 mg by mouth 2 times daily as needed       Cholecalciferol (VITAMIN D) 2000 units CAPS capsule Take by mouth daily       oxybutynin (DITROPAN) 5 MG tablet Take 5 mg by mouth 3 times daily      calcium carbonate (OSCAL) 500 MG TABS tablet Take 1,200 mg by mouth daily      Multiple Vitamins-Minerals (THERAPEUTIC MULTIVITAMIN-MINERALS) tablet Take 1 tablet by mouth daily      PARoxetine (PAXIL) 20 MG tablet Take 20 mg by mouth every morning         REVIEW OF SYSTEMS:    CONSTITUTIONAL: No major weight gain or loss, fatigue, weakness, night sweats or fever. HEENT: No new vision difficulties or ringing in the ears. RESPIRATORY: see HPI. CARDIOVASCULAR: See HPI  GI: No nausea, vomiting, diarrhea, constipation, abdominal pain or changes in bowel habits. : No urinary frequency, urgency, incontinence hematuria or dysuria. SKIN: No cyanosis or skin lesions. MUSCULOSKELETAL: No new muscle or joint pain. Scoliosis noticed on Exam.  NEUROLOGICAL: No syncope or TIA-like symptoms. PSYCHIATRIC: No anxiety, pain, insomnia or depression    Objective:     PHYSICAL EXAM:      VITALS:  BP (!) 183/61 (Site: Right Upper Arm, Position: Sitting, Cuff Size: Medium Adult)   Pulse 53   Resp 18   Ht 5' 5.98\" (1.676 m)   Wt 139 lb 6.4 oz (63.2 kg)   SpO2 97%   BMI 22.51 kg/m²   CONSTITUTIONAL: Cooperative, no apparent distress, and appears well nourished / developed. NEUROLOGIC:  Awake and orientated to person, place and time. PSYCH: Calm affect. SKIN: Warm and dry. HEENT: Sclera non-icteric, normocephalic, neck supple, no elevation of JVP, normal carotid pulses with no bruits and thyroid normal size. LUNGS:  No increased work of breathing and clear to auscultation, no crackles or wheezing. Cardiovascular: Normal rate, regularly irregular rhythm, normal heart sounds. Exam reveals no gallop and no friction rubs. No murmur was heard. .  ABDOMEN:  Normal bowel sounds, non-distended and non-tender to palpation. Extremities: Trace edema at the ankles. No cyanosis or clubbing. 2+ radial and carotid pulses. Distal extremity pulses: 2+ bilaterally.       DATA:    Lab Results   Component Value Date    ALT 13 08/20/2021    AST 17 08/20/2021    ALKPHOS 70 08/20/2021    BILITOT 0.47 08/20/2021     Lab Results   Component Value Date    CREATININE 0.96 (H) 08/20/2021    BUN 34 (H) 08/20/2021     08/20/2021    K 4.5 08/20/2021    CL 98 08/20/2021    CO2 25 08/20/2021     Lab Results   Component Value Date    TSH 1.88 06/28/2021     Lab Results   Component Value Date WBC 4.7 08/20/2021    HGB 10.9 (L) 08/20/2021    HCT 33.8 (L) 08/20/2021    .0 (H) 08/20/2021     08/20/2021       Lab Results   Component Value Date    TRIG 46 06/28/2021    TRIG 65 07/13/2020    TRIG 89 08/16/2017     Lab Results   Component Value Date    HDL 63 06/28/2021    HDL 60 07/13/2020    HDL 57 08/16/2017     Lab Results   Component Value Date    LDLCHOLESTEROL 22 06/28/2021    LDLCHOLESTEROL 29 07/13/2020    LDLCHOLESTEROL 33 08/16/2017       Assessment:      Diagnosis Orders   1. LOC (loss of consciousness) ([facility] [address]  Tilt table test    MRI BRAIN W WO CONTRAST   2. PSVT (paroxysmal supraventricular tachycardia) ([facility] [address]     3. ASHD (arteriosclerotic heart disease)     4. Mixed hyperlipidemia     5. Hypertension, uncontrolled     6. Recurrent falls  Basic Metabolic Panel    Tilt table test    MRI BRAIN W WO CONTRAST   7. Stenosis of right carotid artery         Plan:       Atherosclerotic Heart Disease: Mild, nonobstructive CAD by cardiac cath in August 2017. Antiplatelet Agent: Continue aspirin 81 mg daily and Plavix 75 mg daily. Statin Therapy: Continue atorvastatin (Lipitor) 10 mg nightly. .    Multiple risk factors for CAD including hypertension, dyslipidemia and tobacco abuse. Her LDL at goal.    · Hyperlipidemia: Mixed   · Cholesterol Reduction Therapy: Continue Atorvastatin (Lipitor) 10 mg daily       · Essential Hypertension: Uncontrolled   Very labile hypertension. Patient also not compliant with medications. She did not take her blood pressure medicines this morning and her blood pressure is high.  Beta Blocker: Not good candidate for beta-blocker therapy giving resting sinus bradycardia.  ACE Inibitor/ARB: Continue losartan/HCTZ (Hyzaar) 100/12.5 mg every morning.  Calcium Channel Blocker: She could not tolerate nifedipine. Has caused more dizziness, lightheadedness and low blood pressure.  Diuretics: Continue losartan/HCTZ (Hyzaar) 100/12.5 mg every morning. · Tobacco Abuse Counseling:   · Is using gum trying to quit.  Recurrent loss of consciousness, recurrent falls.  Severe right carotid artery stenosis. · I had an extensive discussion with her and her son regarding possible etiology of her symptoms. · They both agreed that the 3 times she lost consciousness and fell were in hot days and after drinking wine. · She reported that she is doing fine in between episodes. Mild lightheadedness on standing from sitting position but if she stands up slowly this will not happen. No dizziness or lightheadedness when she is walking around and keeping herself hydrated. · Echo, stress test and Holter monitor results did not show a good reason for her recurrent passing out episodes. · CTA of the brain showed severe stenosis of the right carotid artery, patient is going for right carotid artery angiography and possible stenting with Dr. Isis Patton on 9/15/2021. · She has poor sleep but she denied any history of snoring or sleep apnea syndrome from before. She has some memory problems although not overwhelming. I would like to get an MRI of the brain for further evaluation. · She has multiple neck problems, she takes Zanaflex and Celebrex for arthritis of the cervical vertebra and muscle spasm. She takes Paxil for depression. · Her blood pressure is markedly uncontrolled and unfortunately patient is not compliant with medications in addition she has intolerance to multiple antihypertensive medications including beta-blocker and calcium channel blocker meds. · I would like to get a tilt table test for further evaluation. Finally, I recommended that she continue her other medications and follow up with you as previously scheduled. Follow up:   I told Ms. Sharma to call my office if she had any problems, but otherwise told her to Return in about 3 months (around 12/2/2021) for Follow up.  However, I would be happy to see her sooner should the need arise.    Sincerely,   Destiny Linder MD, MS, F.A.C.C. [facility], [address], [facility], [address] [place] [address]  Phone: [phone], Fax: [phone]    I believe that the risk of significant morbidity and mortality related to the patient's current medical conditions are: intermediate-high. >30 minutes were spent during prep work, discussion and exam of the patient, and follow up documentation and all of their questions were answered. The documentation recorded by the scribe, accurately and completely reflects the services I personally performed and the decisions made by me. Destiny Linder MD, F.A.C.C.  September 2, 2021

## 2021-09-07 NOTE — TELEPHONE ENCOUNTER
----- Message from Effie Jiménez MD sent at 9/4/2021  8:31 AM EDT -----  Can you please send a copy of this MRI to Dr. Ministerio Menard MD and to Dr. Elham Magallanes. I also wound like to see her sometime after her carotid intervention to discuss the results of the MRI of the brain and the tilt table test that is mildly to moderately abnormal.  Thank you.

## 2021-09-07 NOTE — PROCEDURES
blood  pressure response and symptoms were most consistent with dysautonomia. Combined with vigilant maintenance of euvolemia and maintaining a  moderate salt intake, pharmacologic treatment with Florinef, ProAmatine  and/or Mestinon among other treatments have shown some effectiveness in  the treatment of this condition. LAMONT Kilpatrick    D: 09/07/2021 9:04:15       T: 09/07/2021 9:05:29     ANITA/JORGE A_[email or web address]  Job#: [id number]     Doc#: Unknown    CC:  Aura Marrufo MD

## 2021-09-08 NOTE — TELEPHONE ENCOUNTER
----- Message from Effie Jiménez MD sent at 9/7/2021  4:49 PM EDT -----  Test result abnormal, please have patient make an appointment for follow up.

## 2021-09-10 NOTE — TELEPHONE ENCOUNTER
Appt changed with Dr. Isis Patton to 9/22/221 at 1:30pm.    Pt will see Dr. Ashely Hall at 12:40 on 9/22/2021. Waiting to get an order to scheduled Carotid Duplex. Called patient about appt changes. Voiced understanding. From: Maurisio Mcfadden <[email or web address]  Sent: Thursday, September 9, 2021 12:40 PM  To: [email or web address]  Cc: [email or web address] Referrals  Subject: Martina Bourgeois should get discharged today from [facility]. Please have her get a carotid duplex in [place] any time within the next 2 wks. Please have her follow with me in 2-4 weeks. Please have her see Dr. Ashely Hall in his first available opening.  (She has seen him in the past.)

## 2021-09-14 NOTE — TELEPHONE ENCOUNTER
Jean Deleon called with questions about [facility] .. Asking if she should continue her zanaflex and also states carotid is hard and swollen. She has a follow up appointment on Sept 21.  Please advise

## 2021-09-16 NOTE — TELEPHONE ENCOUNTER
Called Abigail, she states she will keep appointment with Dr. Cholo Soriano on Wed in [facility]

## 2021-09-16 NOTE — TELEPHONE ENCOUNTER
Called patient with Dr. Luc Yeh instructions. She states bruising is spreading all over body. Told patient if she is concerned she should contact PCP. She states she will contact Dr. Shravan Pete office. Told her to let me know if I can do anything further for her.   States understanding

## 2021-09-22 NOTE — PATIENT INSTRUCTIONS
SURVEY:    You may be receiving a survey from [employer] regarding your visit today. Please complete the survey to enable us to provide the highest quality of care to you and your family. If you cannot score us a very good on any question, please call the office to discuss how we could have made your experience a very good one. Thank you.

## 2021-09-22 NOTE — PROGRESS NOTES
Ann-Marie Guerrero am scribing for and in the presence of Shivani Thomas MD, F.A.C.C..    Subjective:     CHIEF COMPLAINT / HPI:    Chief Complaint   Patient presents with    Follow-up     Hx: ASHD, PSVT, HLD,HTN, Falls, stenosis of right carotid artery. has had some palps. dizziness getting better Denies: CP, SOB     Dear Dr Antonia Gomez is 78 y.o. female who presents today for follow-up. Patient initially seen in the hospital on 8/15/2017 because of chest pain. 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 Patient converted to normal sinus rhythm spontaneously. Risk factors for CAD include hypertension and current smoking. She underwent cardiac catheterization on 8/16/2017 which showed no significant epicardial CAD. She was discharged with an event monitor. She had an episode of paroxysmal supraventricular tachycardia lasting for 35 seconds at a heart rate of 165 bpm recorded. 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 Her metoprolol changed to diltiazem 120 mg daily. Ms. Jaime Buchanan had hospital admission 7/19/2018 for GI problems and is unable to take aspirin due to GI bleed. ECG reviewed, sinus rhythm with frequent monomorphic PVCs. PVCs morphology suggests RVOT origin. Tilt table test done on 9/3/2021- Abnormal head upright tilt table study. The patient's heart rate, blood pressure response and symptoms were most consistent with dysautonomia. 9/8/2021-carotid and peripheral angiography by Dr. Ayush Ng at the [facility]  1. Angioplasty of the right internal carotid artery without the concomitant ability to pass a carotid artery stent in a 99% right internal carotid   artery lesion. 2. 20% left internal carotid artery stenosis. 3. 90% right common iliac artery stenosis successfully treated to 20% residual with an 8 x 38 ICAST stent.      Right carotid endarterectomy by Dr. Avani Gonzalez on 9/8/2021 and [facility]. Following this she had second-degree AV block at night. EP consulted and it was determined that she is not a candidate for pacemaker therapy at this point and patient was discharged with 1 month event monitor which she is still wearing. Her calcium channel blocker and Hyzaar are on hold and patient reported symptomatic improvement of her dizziness. Ms. Shravan Yeung is here today for a follow up. She had right sided carotid endarterectomy. She says she feels a tingle under her skin in her jaw area. She does have a follow up with Dr Adama Brody after this visit. She is currently wearing a 30 day heart monitor that was put on by Electrophysiology to see if she needs a pacemaker. She has went on only two walks since her surgery. Her weight is stable. She says she is drinking plenty of fluids. She has had no near passing out episodes. She did trip and fell on the stairs and now has a hematoma on her left knee. She says she does now have a cyst in her wrist also. She denies any chest pain, pressure or tightness. No significant shortness of breath. She said she stayed fairly active without significant exertional symptoms. She says she wakes up at night with headaches. Past Medical History:    Past Medical History:   Diagnosis Date    Abnormal patient-activated cardiac event monitor 09/06/2017    sinus tachycardia with PSVT lasting 36 sec @ 165 bpm.    Cataract     H/O echocardiogram 08/15/2017    EF 60%. Mildly increased LV wall thickness normal LV cavity size. No definite specific wall motion abnormalities were identified. No significant valvular abnormalities Mild diastolic dysfunction is seen.     Hypertension     Primary cancer of bone marrow [facility])      Past Surgical History:  Past Surgical History:   Procedure Laterality Date    CARDIAC CATHETERIZATION Left 08/16/2017    left radial/[facility]/Dr Garcia    HYSTERECTOMY  1996    UPPER GASTROINTESTINAL ENDOSCOPY N/A 7/21/2018    EGD BIOPSY performed by Yasemin Palm MD at [address] [place] [address] History:    Social History     Tobacco Use   Smoking Status Former Smoker    Packs/day: 1.00    Years: 6.00    Pack years: 6.00    Types: Cigarettes    Quit date: 2021    Years since quittin.2   Smokeless Tobacco Never Used     Current Medications:  Outpatient Medications Marked as Taking for the 21 encounter (Office Visit) with Chin Ellis MD   Medication Sig Dispense Refill    b complex vitamins capsule Take 1 capsule by mouth daily      aspirin 81 MG EC tablet Take 81 mg by mouth daily      clopidogrel (PLAVIX) 75 MG tablet Take 1 tablet by mouth daily 90 tablet 3    atorvastatin (LIPITOR) 10 MG tablet Take 1 tablet by mouth daily 90 tablet 3    topiramate (TOPAMAX) 25 MG tablet TAKE 2 TABLETS EVERY NIGHT 180 tablet 1    celecoxib (CELEBREX) 200 MG capsule Take 200 mg by mouth 2 times daily      valACYclovir (VALTREX) 500 MG tablet Take 1,000 mg by mouth 2 times daily as needed       Cholecalciferol (VITAMIN D) 2000 units CAPS capsule Take by mouth daily       oxybutynin (DITROPAN) 5 MG tablet Take 5 mg by mouth 3 times daily      calcium carbonate (OSCAL) 500 MG TABS tablet Take 1,200 mg by mouth daily      Multiple Vitamins-Minerals (THERAPEUTIC MULTIVITAMIN-MINERALS) tablet Take 1 tablet by mouth daily      PARoxetine (PAXIL) 20 MG tablet Take 20 mg by mouth every morning         REVIEW OF SYSTEMS:    CONSTITUTIONAL: No major weight gain or loss, fatigue, weakness, night sweats or fever. HEENT: No new vision difficulties or ringing in the ears. RESPIRATORY: see HPI. CARDIOVASCULAR: See HPI  GI: No nausea, vomiting, diarrhea, constipation, abdominal pain or changes in bowel habits. : No urinary frequency, urgency, incontinence hematuria or dysuria. SKIN: No cyanosis or skin lesions. MUSCULOSKELETAL: No new muscle or joint pain. Scoliosis noticed on Exam.  NEUROLOGICAL: No syncope or TIA-like symptoms.   PSYCHIATRIC: No anxiety, pain, insomnia or depression    Objective:     PHYSICAL EXAM:      VITALS:  /64 (Site: Right Upper Arm, Position: Sitting, Cuff Size: Medium Adult)   Pulse 65   Resp 18   Ht 5' 5.98\" (1.676 m)   Wt 139 lb 9.6 oz (63.3 kg)   SpO2 97%   BMI 22.54 kg/m²   CONSTITUTIONAL: Cooperative, no apparent distress, and appears well nourished / developed. NEUROLOGIC:  Awake and orientated to person, place and time. PSYCH: Calm affect. SKIN: Warm and dry. HEENT: Sclera non-icteric, normocephalic, neck supple, no elevation of JVP, normal carotid pulses with no bruits and thyroid normal size. LUNGS:  No increased work of breathing and clear to auscultation, no crackles or wheezing. Cardiovascular: Normal rate, regularly irregular rhythm, normal heart sounds. Exam reveals no gallop and no friction rubs. No murmur was heard. .  ABDOMEN:  Normal bowel sounds, non-distended and non-tender to palpation. Extremities: Trace edema at the ankles. No cyanosis or clubbing. 2+ radial and carotid pulses. Distal extremity pulses: 2+ bilaterally.       DATA:    Lab Results   Component Value Date    ALT 13 08/20/2021    AST 17 08/20/2021    ALKPHOS 70 08/20/2021    BILITOT 0.47 08/20/2021     Lab Results   Component Value Date    CREATININE 0.86 09/02/2021    BUN 18 09/02/2021     09/02/2021    K 4.0 09/02/2021     09/02/2021    CO2 27 09/02/2021     Lab Results   Component Value Date    TSH 1.88 06/28/2021     Lab Results   Component Value Date    WBC 4.3 09/02/2021    HGB 11.1 (L) 09/02/2021    HCT 33.5 (L) 09/02/2021    .0 (H) 09/02/2021     09/02/2021       Lab Results   Component Value Date    TRIG 46 06/28/2021    TRIG 65 07/13/2020    TRIG 89 08/16/2017     Lab Results   Component Value Date    HDL 63 06/28/2021    HDL 60 07/13/2020    HDL 57 08/16/2017     Lab Results   Component Value Date    LDLCHOLESTEROL 22 06/28/2021    LDLCHOLESTEROL 29 07/13/2020    LDLCHOLESTEROL 33 08/16/2017 Assessment:      Diagnosis Orders   1. Dizziness     2. ASHD (arteriosclerotic heart disease)     3. Mixed hyperlipidemia     4. Hypertension, uncontrolled     5. Right-sided extracranial carotid artery stenosis     6. Peripheral vascular disease ([address]     7. History of right-sided carotid endarterectomy         Plan:   · Lightheadedness/dizziness:  · Mildly abnormal tilt table test consistent with dysautonomia. · I did review the results of her Tilt table test with her and answered all questions they had. · She has very labile blood pressure. Currently off calcium channel blocker and off Hyzaar. She reported symptomatic improvement of dizziness. I told her to monitor her blood pressure for the next 2 weeks and call me with the blood pressure log and we will restart a low-dose losartan if needed. · Continue follow-up with electrophysiology at the [address] [place] [address], if she has symptomatic bradycardia she will need a pacemaker therapy  · Nonpharmacologic counseling: Because of her condition, I reminded her to try and keep herself well-hydrated and to take extra time when moving from laying to sitting, sitting to standing and standing to walking. I also explained to her to help improve her symptoms she should include 3 g sodium diet, 1 or 2 L of sports drinks daily, knee-high compressions stockings. · She is currently wearing a 30 day cardiac event monitor. Atherosclerotic Heart Disease: Mild, nonobstructive CAD by cardiac cath in August 2017. Antiplatelet Agent: Continue aspirin 81 mg daily and Plavix 75 mg daily. Statin Therapy: Continue atorvastatin (Lipitor) 10 mg nightly. .    Multiple risk factors for CAD including hypertension, dyslipidemia and tobacco abuse.   Her LDL at goal.    · Hyperlipidemia: Mixed   · Cholesterol Reduction Therapy: Continue Atorvastatin (Lipitor) 10 mg daily       · Essential Hypertension: Uncontrolled   Beta Blocker: Not good candidate for beta-blocker therapy giving resting sinus bradycardia.  ACE Inibitor/ARB: She is currently of Hyzaar as above. History of intolerance to calcium channel blockers including nifedipine and diltiazem.  I want her to keep monitoring her blood pressure and heart rate for two weeks and call our office so we can make changes to medication as needed.  Carotid artery disease, peripheral vascular disease   Continue aspirin, Plavix and Lipitor.  Follow-up with Dr. Cholo Soriano as previously scheduled. Finally, I recommended that she continue her other medications and follow up with you as previously scheduled. Follow up:   I told Ms. Sharma to call my office if she had any problems, but otherwise told her to Return in about 3 months (around 12/22/2021). However, I would be happy to see her sooner should the need arise. Sincerely,   Nati Canales MD, MS, F.A.C.C. [facility], [address] [place] [address]  Phone: [phone], Fax: [phone]    I believe that the risk of significant morbidity and mortality related to the patient's current medical conditions are: intermediate-high. >30 minutes were spent during prep work, discussion and exam of the patient, and follow up documentation and all of their questions were answered. The documentation recorded by the scribe, accurately and completely reflects the services I personally performed and the decisions made by me. Nati Canales MD, F.A.C.C.  September 22, 2021

## 2021-09-22 NOTE — PROGRESS NOTES
Lara Liu was seen on 9/22/2021 for   Chief Complaint   Patient presents with    Follow-up     Patient states started bruising everywhere. Getting better now   .                             REVIEW OF SYSTEMS    Constitutional Weight: absent, A, Fatigue: present Fever: absent   HEENT Ears: normal,Visual disturbance: absent Sore throat: absent,    Respiratory Shortness of breath: absent, Cough: absent;, Snoring: absent   Cardivascular Chest pain: absent,  Leg pain: absent,Leg swelling:present, Non-healing wound:absent    GI Diarrhea: absent, Abdominal Pain: absent    Urinary frequency: absent, Urinary incontinence: absent   Musculoskeletal Neck pain: absent, Back pain: absent, Restless Leg:absent     Dermatological Hair loss: absent, Skin changes: absent   Neurological  Sudden Loss of Vision in one eye:absent, Slurred Speech:absent, Weakness on one side of body: absent,Headache: absent   Psychiatric Anxiety: absent, Depression: absent   Hematologic Abnormal bleeding: absent  ,
No    Sexual activity: Not on file   Other Topics Concern    Not on file   Social History Narrative    Not on file     Social Determinants of Health     Financial Resource Strain:     Difficulty of Paying Living Expenses:    Food Insecurity:     Worried About Running Out of Food in the Last Year:     [address] in the Last Year:    Transportation Needs:     Lack of Transportation (Medical):  Lack of Transportation (Non-Medical):    Physical Activity:     Days of Exercise per Week:     Minutes of Exercise per Session:    Stress:     Feeling of Stress :    Social Connections:     Frequency of Communication with Friends and Family:     Frequency of Social Gatherings with Friends and Family:     Attends Gnosticist Services:     Active Member of Clubs or Organizations:     Attends Club or Organization Meetings:     Marital Status:    Intimate Partner Violence:     Fear of Current or Ex-Partner:     Emotionally Abused:     Physically Abused:     Sexually Abused:      Family History   Problem Relation Age of Onset    Lupus Mother     Cancer Father         leukemia    Brain Cancer Sister      Past Medical History:   Diagnosis Date    Abnormal patient-activated cardiac event monitor 09/06/2017    sinus tachycardia with PSVT lasting 36 sec @ 165 bpm.    Cataract     H/O echocardiogram 08/15/2017    EF 60%. Mildly increased LV wall thickness normal LV cavity size. No definite specific wall motion abnormalities were identified. No significant valvular abnormalities Mild diastolic dysfunction is seen.     Hypertension     Primary cancer of bone marrow (HCC)      Current Outpatient Medications   Medication Sig Dispense Refill    b complex vitamins capsule Take 1 capsule by mouth daily      aspirin 81 MG EC tablet Take 81 mg by mouth daily      clopidogrel (PLAVIX) 75 MG tablet Take 1 tablet by mouth daily 90 tablet 3    atorvastatin (LIPITOR) 10 MG tablet Take 1 tablet by mouth daily 90

## 2021-10-04 NOTE — TELEPHONE ENCOUNTER
Ms. Agueda Nathan called and asked if she can have her Flu shot with all her medications and issues recently?  Please advise    Thank you     Nam Moseley

## 2021-10-05 NOTE — TELEPHONE ENCOUNTER
Yes she should take her flu shot. No problem from the heart standpoint or the medication she is taking.   Thank you

## 2021-11-03 NOTE — PATIENT INSTRUCTIONS
SURVEY:    You may be receiving a survey from [employer] regarding your visit today. Please complete the survey to enable us to provide the highest quality of care to you and your family. If you cannot score us a very good on any question, please call the office to discuss how we could have made your experience a very good one. Thank you.

## 2021-11-03 NOTE — PROGRESS NOTES
Dong Milton was seen on 11/3/2021 for   Chief Complaint   Patient presents with    Follow-up     follow up   . Soledad Sheikh 8/11/21 1st [facility] visit. PCP is Hal Jenkins, [employer] [facility] [facility]. Referred to me after a stay at [facility]. Had a fall, LOC (while in [address] Briggs \"funny\". Was wondering if she had a stroke. Head CT was nl, but neck CTA showed 8/7/21 mindy > 85% stenosis. Has had some orientation difficulty. Smoked until last month. Some foods cause pain. Voluntary weight loss from 190's to 130's. No real food fear. 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 7/30/19 duplex celliac 470, sma 409.  No claudication. CT 7/19/18 Showed 60% rcia. She reports vision changes in her right eye, but has also had right eye surgery. Sees Dr Raquel Jin. PVCs, AVNRT. Cr 0.74  Hx of HTN (as high as 343 systolic)  No asa  Med list includes lipitor.   UPDATE 9/22/21 On 9/7/21 had angio showing critical mindy stenosis. Angioplasty performed c up to 5 mm balloon but couldn't pass stent. Rambo Hernandez performed CEA same day. No neuro problems other than mild tingling right jaw. Incision seemed thick, hard, but not infected. Leg swelling has resolved. Bruising improving No bleeding. 109/76  During cath a critical rcia stensosis was detected and treated with icast 8x38. No subsequent claudication. UPDATE 11/3/21 Duplex 10/26/21 showed bilateral 50-69. Still having dizzy spells. BP has been very labile. Home records show swings from 100 to 200. No focal neuro sx. Cataracts. Knees hurt. Hasn't been walking much. Bruising easily. Had a nosebleed. Plavix was started prior to carotid angio. Event recorder at [facility] showed frequent PSVC's and runs of SVT. The longest run had a rate of only 106. Fastest rate 171.  Will scan into Epic.         Social History     Socioeconomic History    Marital status:      Spouse name: Not on file    Number of children: Not on file    Years of education: Not on file    Highest education level: Not on file   Occupational History    Not on file   Tobacco Use    Smoking status: Former Smoker     Packs/day: 1.00     Years: 6.00     Pack years: 6.00     Types: Cigarettes     Quit date: 2021     Years since quittin.3    Smokeless tobacco: Never Used   Vaping Use    Vaping Use: Never used   Substance and Sexual Activity    Alcohol use: Yes     Comment: 3 or 4 glasses with dinner    Drug use: No    Sexual activity: Not on file   Other Topics Concern    Not on file   Social History Narrative    Not on file     Social Determinants of Health     Financial Resource Strain:     Difficulty of Paying Living Expenses:    Food Insecurity:     Worried About Running Out of Food in the Last Year:     [address] in the Last Year:    Transportation Needs:     Lack of Transportation (Medical):  Lack of Transportation (Non-Medical):    Physical Activity:     Days of Exercise per Week:     Minutes of Exercise per Session:    Stress:     Feeling of Stress :    Social Connections:     Frequency of Communication with Friends and Family:     Frequency of Social Gatherings with Friends and Family:     Attends Mormon Services:     Active Member of Clubs or Organizations:     Attends Club or Organization Meetings:     Marital Status:    Intimate Partner Violence:     Fear of Current or Ex-Partner:     Emotionally Abused:     Physically Abused:     Sexually Abused:      Family History   Problem Relation Age of Onset    Lupus Mother     Cancer Father         leukemia    Brain Cancer Sister      Past Medical History:   Diagnosis Date    Abnormal patient-activated cardiac event monitor 2017    sinus tachycardia with PSVT lasting 36 sec @ 165 bpm.    Cataract     H/O echocardiogram 08/15/2017    EF 60%. Mildly increased LV wall thickness normal LV cavity size. No definite specific wall motion abnormalities were identified. No significant valvular abnormalities Mild diastolic dysfunction is seen.     Hypertension     Primary cancer of bone marrow ([facility]      Current Outpatient Medications   Medication Sig Dispense Refill    b complex vitamins capsule Take 1 capsule by mouth daily      aspirin 81 MG EC tablet Take 81 mg by mouth daily      clopidogrel (PLAVIX) 75 MG tablet Take 1 tablet by mouth daily 90 tablet 3    atorvastatin (LIPITOR) 10 MG tablet Take 1 tablet by mouth daily 90 tablet 3    topiramate (TOPAMAX) 25 MG tablet TAKE 2 TABLETS EVERY NIGHT 180 tablet 1    celecoxib (CELEBREX) 200 MG capsule Take 200 mg by mouth 2 times daily      tiZANidine (ZANAFLEX) 4 MG tablet Take 4 mg by mouth 3 times daily       vitamin B-12 (CYANOCOBALAMIN) 1000 MCG tablet Take 1,000 mcg by mouth daily      valACYclovir (VALTREX) 500 MG tablet Take 1,000 mg by mouth 2 times daily as needed       Cholecalciferol (VITAMIN D) 2000 units CAPS capsule Take by mouth daily       oxybutynin (DITROPAN) 5 MG tablet Take 5 mg by mouth 3 times daily      calcium carbonate (OSCAL) 500 MG TABS tablet Take 1,200 mg by mouth daily      Multiple Vitamins-Minerals (THERAPEUTIC MULTIVITAMIN-MINERALS) tablet Take 1 tablet by mouth daily      PARoxetine (PAXIL) 20 MG tablet Take 20 mg by mouth every morning       No current facility-administered medications for this visit. Physical findings:  General- no acute distress, oriented  HEENT - no xanthelasma, external ears normal  Neck- Supple, no thyromegaly  Carotids - left carotid bruit, none on right. cea incision well healed  Lungs - Clear to auscultation. CV- Regular rate and rythym, no murmurs rubs or gallops  Abdomen - Non tender, non distended, no bruits  Skin- warm, dry, no skin breakdown or gangrene  Extremities - no edema    Pulses Right - Posterior tibial:    2+  Dorsalis pedis:  absent     Pulses Left -Posterior tibial: absent  Dorsalis pedis:  2+      Assessment:  Encounter Diagnoses   Name Primary?     Bilateral carotid artery stenosis Yes    PAD (peripheral artery disease) ([facility]      No claudication  Will obtain isabel  svt  Carotid status ok without sx  Labile bp  Plan of care:  rtc 6 mo  F/u with Dr Elena Moseley  Stop plavix, continue asa  40 min chart review and pt encounter  Annual carotid duplex  Follow up and evaluate.        Electronically signed by Naida Montalvo MD on 11/3/21 at 9:57 AM EDT

## 2021-11-03 NOTE — PROGRESS NOTES
Mecca Licona was seen on 11/3/2021 for   Chief Complaint   Patient presents with    Follow-up     follow up   .                             REVIEW OF SYSTEMS    Constitutional Weight: absent, A, Fatigue: absent Fever: absent   HEENT Ears: normal,Visual disturbance: present Sore throat: absent,    Respiratory Shortness of breath: absent, Cough: absent;, Snoring: absent   Cardivascular Chest pain: present,  Leg pain: absent,Leg swelling:absent, Non-healing wound:absent    GI Diarrhea: absent, Abdominal Pain: absent    Urinary frequency: absent, Urinary incontinence: absent   Musculoskeletal Neck pain: absent, Back pain: absent, Restless Leg:absent     Dermatological Hair loss: absent, Skin changes: absent   Neurological  Sudden Loss of Vision in one eye:absent, Slurred Speech:absent, Weakness on one side of body: absent,Headache: present   Psychiatric Anxiety: absent, Depression: absent   Hematologic Abnormal bleeding: absent,

## 2021-11-22 NOTE — PROGRESS NOTES
Royal Lamar am scribing for and in the presence of Eileen Hernandez MD, F.A.C.C..    Subjective:     CHIEF COMPLAINT / HPI:    Chief Complaint   Patient presents with    Follow-up     Hx: dizziness. CP off and on, pressure lasting 10 minutes, goes away on its own, same as last visit. heart racing not too often Denies: SOB, dizziness, syncope     Dear Dr Eulis Cooks is 73 y.o. female who presents today for follow-up. Patient initially seen in the hospital on 8/15/2017 because of chest pain. 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 Patient converted to normal sinus rhythm spontaneously. Risk factors for CAD include hypertension and current smoking. She underwent cardiac catheterization on 8/16/2017 which showed no significant epicardial CAD. She was discharged with an event monitor. She had an episode of paroxysmal supraventricular tachycardia lasting for 35 seconds at a heart rate of 165 bpm recorded. 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 Her metoprolol changed to diltiazem 120 mg daily. Ms. Nica Stout had hospital admission 7/19/2018 for GI problems and is unable to take aspirin due to GI bleed. ECG reviewed, sinus rhythm with frequent monomorphic PVCs. PVCs morphology suggests RVOT origin. Tilt table test done on 9/3/2021- Abnormal head upright tilt table study. The patient's heart rate, blood pressure response and symptoms were most consistent with dysautonomia. 9/8/2021-carotid and peripheral angiography by Dr. Katelin Genao at the [facility]  1. Angioplasty of the right internal carotid artery without the concomitant ability to pass a carotid artery stent in a 99% right internal carotid   artery lesion. 2. 20% left internal carotid artery stenosis. 3. 90% right common iliac artery stenosis successfully treated to 20% residual with an 8 x 38 ICAST stent.      Right carotid endarterectomy by Dr. Rafael Scheuermann on 9/8/2021 and [facility]. Following this she had second-degree AV block at night. EP consulted and it was determined that she is not a candidate for pacemaker therapy at this point and patient was discharged with 1 month event monitor which she is still wearing. Her calcium channel blocker and Hyzaar are on hold and patient reported symptomatic improvement of her dizziness. Ms. Ilda Ramirez is here today for a follow up. I reviewed the heart monitor that was done by the [address], scanned in the media. Very frequent supraventricular ectopic beats but no actual tracing and we do not know what percentage/burden of PVCs recorded. She said her dizziness is much better. She has intermittent chest pressure that has not changed over the past several months. No significant shortness of breath. No lightheadedness, presyncope or syncopal episodes. No nausea, vomiting or abdominal pain. She is not very active and we discussed the need for at least 30-45 minutes of walking at least 5 days a week. She sleeps okay for most nights. No fever, cough or chills. Overall she think that she is doing really good since last visit. Past Medical History:    Past Medical History:   Diagnosis Date    Abnormal patient-activated cardiac event monitor 09/06/2017    sinus tachycardia with PSVT lasting 36 sec @ 165 bpm.    Cataract     H/O echocardiogram 08/15/2017    EF 60%. Mildly increased LV wall thickness normal LV cavity size. No definite specific wall motion abnormalities were identified. No significant valvular abnormalities Mild diastolic dysfunction is seen.     Hypertension     Primary cancer of bone marrow [facility])      Past Surgical History:  Past Surgical History:   Procedure Laterality Date    CARDIAC CATHETERIZATION Left 08/16/2017    left radial/[facility]/Dr Garcia    HYSTERECTOMY  1996    UPPER GASTROINTESTINAL ENDOSCOPY N/A 7/21/2018    EGD BIOPSY performed by Hay Taylor MD at [facility] OR     Social History:    Social History     Tobacco Use   Smoking Status Former Smoker    Packs/day: 1.00    Years: 6.00    Pack years: 6.00    Types: Cigarettes    Quit date: 2021    Years since quittin.3   Smokeless Tobacco Never Used     Current Medications:  Outpatient Medications Marked as Taking for the 21 encounter (Office Visit) with Selby Carrel, MD   Medication Sig Dispense Refill    b complex vitamins capsule Take 1 capsule by mouth daily      aspirin 81 MG EC tablet Take 81 mg by mouth daily      atorvastatin (LIPITOR) 10 MG tablet Take 1 tablet by mouth daily 90 tablet 3    topiramate (TOPAMAX) 25 MG tablet TAKE 2 TABLETS EVERY NIGHT 180 tablet 1    celecoxib (CELEBREX) 200 MG capsule Take 200 mg by mouth 2 times daily      tiZANidine (ZANAFLEX) 4 MG tablet Take 4 mg by mouth 3 times daily       vitamin B-12 (CYANOCOBALAMIN) 1000 MCG tablet Take 1,000 mcg by mouth daily      valACYclovir (VALTREX) 500 MG tablet Take 1,000 mg by mouth 2 times daily as needed       Cholecalciferol (VITAMIN D) 2000 units CAPS capsule Take by mouth daily       oxybutynin (DITROPAN) 5 MG tablet Take 5 mg by mouth 3 times daily      calcium carbonate (OSCAL) 500 MG TABS tablet Take 1,200 mg by mouth daily      Multiple Vitamins-Minerals (THERAPEUTIC MULTIVITAMIN-MINERALS) tablet Take 1 tablet by mouth daily      PARoxetine (PAXIL) 20 MG tablet Take 20 mg by mouth every morning         REVIEW OF SYSTEMS:    CONSTITUTIONAL: No major weight gain or loss, fatigue, weakness, night sweats or fever. HEENT: No new vision difficulties or ringing in the ears. RESPIRATORY: see HPI. CARDIOVASCULAR: See HPI  GI: No nausea, vomiting, diarrhea, constipation, abdominal pain or changes in bowel habits. : No urinary frequency, urgency, incontinence hematuria or dysuria. SKIN: No cyanosis or skin lesions. MUSCULOSKELETAL: No new muscle or joint pain.  Scoliosis noticed on Exam.  NEUROLOGICAL: No syncope or TIA-like symptoms. PSYCHIATRIC: No anxiety, pain, insomnia or depression    Objective:     PHYSICAL EXAM:      VITALS:  BP (!) 140/65 (Site: Right Upper Arm, Position: Sitting, Cuff Size: Medium Adult)   Pulse 70   Resp 18   Ht 5' 7.01\" (1.702 m)   Wt 142 lb 3.2 oz (64.5 kg)   SpO2 99%   BMI 22.27 kg/m²   CONSTITUTIONAL: Cooperative, no apparent distress, and appears well nourished / developed. NEUROLOGIC:  Awake and orientated to person, place and time. PSYCH: Calm affect. SKIN: Warm and dry. HEENT: Sclera non-icteric, normocephalic, neck supple, no elevation of JVP, normal carotid pulses with no bruits and thyroid normal size. LUNGS:  No increased work of breathing and clear to auscultation, no crackles or wheezing. Cardiovascular: Normal rate, regularly irregular rhythm, normal heart sounds. Exam reveals no gallop and no friction rubs. No murmur was heard. .  ABDOMEN:  Normal bowel sounds, non-distended and non-tender to palpation. Extremities: Trace edema at the ankles. No cyanosis or clubbing. 2+ radial and carotid pulses. Distal extremity pulses: 2+ bilaterally.       DATA:    Lab Results   Component Value Date    ALT 11 10/25/2021    AST 15 10/25/2021    ALKPHOS 89 10/25/2021    BILITOT 0.24 (L) 10/25/2021     Lab Results   Component Value Date    CREATININE 0.66 10/25/2021    BUN 18 10/25/2021     10/25/2021    K 4.2 10/25/2021     10/25/2021    CO2 22 10/25/2021     Lab Results   Component Value Date    TSH 1.88 06/28/2021     Lab Results   Component Value Date    WBC 4.3 10/25/2021    HGB 11.0 (L) 10/25/2021    HCT 35.2 (L) 10/25/2021    .0 (H) 10/25/2021     10/25/2021       Lab Results   Component Value Date    TRIG 46 06/28/2021    TRIG 65 07/13/2020    TRIG 89 08/16/2017     Lab Results   Component Value Date    HDL 63 06/28/2021    HDL 60 07/13/2020    HDL 57 08/16/2017     Lab Results   Component Value Date    LDLCHOLESTEROL 22 06/28/2021 LDLCHOLESTEROL 29 07/13/2020    LDLCHOLESTEROL 33 08/16/2017       Assessment:      Diagnosis Orders   1. Dizziness     2. ASHD (arteriosclerotic heart disease)     3. Mixed hyperlipidemia     4. Essential hypertension     5. Peripheral vascular disease ([address]       Plan:   · Lightheadedness/dizziness:  · Mildly abnormal tilt table test consistent with dysautonomia. · She has very labile blood pressure. Currently off calcium channel blocker and off Hyzaar. She reported symptomatic improvement of dizziness. · Nonpharmacologic counseling: Because of her condition, I reminded her to try and keep herself well-hydrated and to take extra time when moving from laying to sitting, sitting to standing and standing to walking. I also explained to her to help improve her symptoms she should include 3 g sodium diet, 1 or 2 L of sports drinks daily, knee-high compressions stockings. · I ordered a repeat CAM monitor giving history of paroxysmal supraventricular tachycardia, recurrent dizziness and history of heart block post right carotid endarterectomy. Atherosclerotic Heart Disease: Mild, nonobstructive CAD by cardiac cath in August 2017. Antiplatelet Agent: Continue aspirin 81 mg daily and Plavix 75 mg daily. Statin Therapy: Continue atorvastatin (Lipitor) 10 mg nightly. .    Multiple risk factors for CAD including hypertension, dyslipidemia and tobacco abuse. Her LDL at goal.    · Hyperlipidemia: Mixed   · Cholesterol Reduction Therapy: Continue Atorvastatin (Lipitor) 10 mg daily       · Essential Hypertension: Uncontrolled   Labile blood pressure but for the most part it is controlled without medications.  I want her to keep monitoring her blood pressure and heart rate for two weeks and call our office so we can make changes to medication as needed.  Carotid artery disease, peripheral vascular disease   Continue aspirin, Plavix and Lipitor.  Follow-up with Dr. Yessy Nuno as previously scheduled.     Finally, I recommended that she continue her other medications and follow up with you as previously scheduled. Follow up:   I told Ms. Sharma to call my office if she had any problems, but otherwise told her to Return in about 6 months (around 5/22/2022). However, I would be happy to see her sooner should the need arise. Sincerely,   Philip Denver MD, MS, F.[email or web address].[email or web address]. [facility], [address] [place] [address]  Phone: [phone], Fax: [phone]    I believe that the risk of significant morbidity and mortality related to the patient's current medical conditions are: Intermediate. >15 minutes were spent during prep work, discussion and exam of the patient, and follow up documentation and all of their questions were answered. The documentation recorded by the scribe, accurately and completely reflects the services I personally performed and the decisions made by me. Philip Denver MD, F.A.C.C.  November 22, 2021

## 2021-11-22 NOTE — PATIENT INSTRUCTIONS
SURVEY:    You may be receiving a survey from [employer] regarding your visit today. Please complete the survey to enable us to provide the highest quality of care to you and your family. If you cannot score us a very good on any question, please call the office to discuss how we could have made your experience a very good one. Thank you.

## 2022-01-01 ENCOUNTER — APPOINTMENT (OUTPATIENT)
Dept: GENERAL RADIOLOGY | Age: 72
DRG: 085 | End: 2022-01-01
Payer: MEDICARE

## 2022-01-01 ENCOUNTER — HOSPITAL ENCOUNTER (EMERGENCY)
Age: 72
Discharge: HOME | End: 2022-01-21
Attending: EMERGENCY MEDICINE
Payer: MEDICARE

## 2022-01-01 ENCOUNTER — APPOINTMENT (OUTPATIENT)
Dept: CT IMAGING | Age: 72
DRG: 085 | End: 2022-01-01
Payer: MEDICARE

## 2022-01-01 ENCOUNTER — APPOINTMENT (OUTPATIENT)
Dept: MRI IMAGING | Age: 72
DRG: 085 | End: 2022-01-01
Payer: MEDICARE

## 2022-01-01 ENCOUNTER — APPOINTMENT (OUTPATIENT)
Dept: CT IMAGING | Age: 72
End: 2022-01-01
Payer: MEDICARE

## 2022-01-01 ENCOUNTER — OFFICE VISIT (OUTPATIENT)
Dept: NEUROLOGY | Age: 72
End: 2022-01-01
Payer: MEDICARE

## 2022-01-01 ENCOUNTER — HOSPITAL ENCOUNTER (INPATIENT)
Age: 72
LOS: 2 days | DRG: 085 | End: 2022-04-15
Attending: EMERGENCY MEDICINE | Admitting: SURGERY
Payer: MEDICARE

## 2022-01-01 ENCOUNTER — HOSPITAL ENCOUNTER (OUTPATIENT)
Age: 72
Discharge: HOME | End: 2022-04-12
Payer: MEDICARE

## 2022-01-01 ENCOUNTER — HOSPITAL ENCOUNTER (OUTPATIENT)
Dept: WOMENS IMAGING | Age: 72
Discharge: HOME | End: 2022-02-03
Payer: MEDICARE

## 2022-01-01 ENCOUNTER — HOSPITAL ENCOUNTER (EMERGENCY)
Age: 72
Discharge: ANOTHER HOSPITAL | End: 2022-04-12
Attending: EMERGENCY MEDICINE
Payer: MEDICARE

## 2022-01-01 VITALS
HEART RATE: 72 BPM | DIASTOLIC BLOOD PRESSURE: 63 MMHG | SYSTOLIC BLOOD PRESSURE: 157 MMHG | RESPIRATION RATE: 18 BRPM | TEMPERATURE: 97.6 F | BODY MASS INDEX: 22.63 KG/M2 | HEIGHT: 66 IN | WEIGHT: 140.8 LBS

## 2022-01-01 VITALS
TEMPERATURE: 99.9 F | HEIGHT: 66 IN | SYSTOLIC BLOOD PRESSURE: 102 MMHG | BODY MASS INDEX: 23.95 KG/M2 | OXYGEN SATURATION: 97 % | WEIGHT: 149.03 LBS | DIASTOLIC BLOOD PRESSURE: 64 MMHG

## 2022-01-01 VITALS
TEMPERATURE: 97.4 F | SYSTOLIC BLOOD PRESSURE: 204 MMHG | DIASTOLIC BLOOD PRESSURE: 50 MMHG | OXYGEN SATURATION: 94 % | WEIGHT: 138 LBS | BODY MASS INDEX: 22.18 KG/M2 | HEART RATE: 60 BPM | RESPIRATION RATE: 12 BRPM | HEIGHT: 66 IN

## 2022-01-01 VITALS
OXYGEN SATURATION: 97 % | HEART RATE: 72 BPM | WEIGHT: 140 LBS | BODY MASS INDEX: 22.6 KG/M2 | TEMPERATURE: 99.4 F | DIASTOLIC BLOOD PRESSURE: 71 MMHG | RESPIRATION RATE: 18 BRPM | SYSTOLIC BLOOD PRESSURE: 220 MMHG

## 2022-01-01 DIAGNOSIS — G43.109 MIGRAINE WITH AURA AND WITHOUT STATUS MIGRAINOSUS, NOT INTRACTABLE: Primary | ICD-10-CM

## 2022-01-01 DIAGNOSIS — Z12.31 BREAST CANCER SCREENING BY MAMMOGRAM: ICD-10-CM

## 2022-01-01 DIAGNOSIS — L03.211 FACIAL CELLULITIS: Primary | ICD-10-CM

## 2022-01-01 DIAGNOSIS — I60.9 SUBARACHNOID BLEED (HCC): Primary | ICD-10-CM

## 2022-01-01 DIAGNOSIS — S09.90XA CLOSED HEAD INJURY, INITIAL ENCOUNTER: ICD-10-CM

## 2022-01-01 DIAGNOSIS — S06.5XAA SUBDURAL HEMATOMA: ICD-10-CM
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
## 2022-01-01 DIAGNOSIS — S00.03XA TRAUMATIC HEMATOMA OF SCALP, INITIAL ENCOUNTER: ICD-10-CM

## 2022-01-01 DIAGNOSIS — S06.5XAA SDH (SUBDURAL HEMATOMA): Primary | ICD-10-CM

## 2022-01-01 LAB
-: NORMAL
ABO/RH: NORMAL
ABSOLUTE EOS #: 0 K/UL (ref 0–0.4)
ABSOLUTE EOS #: 0.03 K/UL (ref 0–0.44)
ABSOLUTE EOS #: 0.06 K/UL (ref 0–0.44)
ABSOLUTE EOS #: 0.08 K/UL (ref 0–0.44)
ABSOLUTE EOS #: 0.09 K/UL (ref 0–0.44)
ABSOLUTE EOS #: 0.13 K/UL (ref 0–0.44)
ABSOLUTE IMMATURE GRANULOCYTE: 0.02 K/UL (ref 0–0.3)
ABSOLUTE IMMATURE GRANULOCYTE: 0.03 K/UL (ref 0–0.3)
ABSOLUTE IMMATURE GRANULOCYTE: 0.03 K/UL (ref 0–0.3)
ABSOLUTE IMMATURE GRANULOCYTE: 0.05 K/UL (ref 0–0.3)
ABSOLUTE IMMATURE GRANULOCYTE: <0.03 K/UL (ref 0–0.3)
ABSOLUTE IMMATURE GRANULOCYTE: <0.03 K/UL (ref 0–0.3)
ABSOLUTE LYMPH #: 0.39 K/UL (ref 1–4.8)
ABSOLUTE LYMPH #: 1.07 K/UL (ref 1.1–3.7)
ABSOLUTE LYMPH #: 1.15 K/UL (ref 1.1–3.7)
ABSOLUTE LYMPH #: 1.71 K/UL (ref 1.1–3.7)
ABSOLUTE LYMPH #: 1.73 K/UL (ref 1.1–3.7)
ABSOLUTE LYMPH #: 2.62 K/UL (ref 1.1–3.7)
ABSOLUTE MONO #: 0.09 K/UL (ref 0.1–0.8)
ABSOLUTE MONO #: 0.28 K/UL (ref 0.1–1.2)
ABSOLUTE MONO #: 0.42 K/UL (ref 0.1–1.2)
ABSOLUTE MONO #: 0.5 K/UL (ref 0.1–1.2)
ABSOLUTE MONO #: 0.55 K/UL (ref 0.1–1.2)
ABSOLUTE MONO #: 0.62 K/UL (ref 0.1–1.2)
ACT TEG: 121 SEC (ref 86–118)
ACT TEG: 136 SEC (ref 86–118)
ACTION: NORMAL
ALBUMIN (CALCULATED): 4.2 G/DL (ref 3.2–5.2)
ALBUMIN PERCENT: 51 % (ref 45–65)
ALBUMIN SERPL-MCNC: 2.9 G/DL (ref 3.5–5.2)
ALBUMIN SERPL-MCNC: 3.2 G/DL (ref 3.5–5.2)
ALBUMIN SERPL-MCNC: 3.8 G/DL (ref 3.5–5.2)
ALBUMIN/GLOBULIN RATIO: 0.8 (ref 1–2.5)
ALLEN TEST: ABNORMAL
ALP BLD-CCNC: 49 U/L (ref 35–104)
ALP BLD-CCNC: 54 U/L (ref 35–104)
ALP BLD-CCNC: 69 U/L (ref 35–104)
ALPHA 1 PERCENT: 2 % (ref 3–6)
ALPHA 2 PERCENT: 6 % (ref 6–13)
ALPHA-1-GLOBULIN: 0.2 G/DL (ref 0.1–0.4)
ALPHA-2-GLOBULIN: 0.5 G/DL (ref 0.5–0.9)
ALT SERPL-CCNC: 6 U/L (ref 5–33)
ALT SERPL-CCNC: 7 U/L (ref 5–33)
ALT SERPL-CCNC: 9 U/L (ref 5–33)
AMMONIA: 32 UMOL/L (ref 11–51)
AMPHETAMINE SCREEN URINE: NEGATIVE
ANGLE, RAPID TEG: 50.1 DEG (ref 64–80)
ANGLE, RAPID TEG: 71.5 DEG (ref 64–80)
ANION GAP SERPL CALCULATED.3IONS-SCNC: 10 MMOL/L (ref 9–17)
ANION GAP SERPL CALCULATED.3IONS-SCNC: 11 MMOL/L (ref 9–17)
ANION GAP SERPL CALCULATED.3IONS-SCNC: 13 MMOL/L (ref 9–17)
ANION GAP SERPL CALCULATED.3IONS-SCNC: 15 MMOL/L (ref 9–17)
ANION GAP SERPL CALCULATED.3IONS-SCNC: 8 MMOL/L (ref 9–17)
ANION GAP SERPL CALCULATED.3IONS-SCNC: 9 MMOL/L (ref 9–17)
ANION GAP: 10 MMOL/L (ref 7–16)
ANION GAP: 4 MMOL/L (ref 7–16)
ANION GAP: 6 MMOL/L (ref 7–16)
ANION GAP: 7 MMOL/L (ref 7–16)
ANION GAP: 8 MMOL/L (ref 7–16)
ANTIBODY SCREEN: NEGATIVE
ARM BAND NUMBER: NORMAL
AST SERPL-CCNC: 12 U/L
AST SERPL-CCNC: 12 U/L
AST SERPL-CCNC: 13 U/L
BARBITURATE SCREEN URINE: NEGATIVE
BASOPHILS # BLD: 0 % (ref 0–2)
BASOPHILS ABSOLUTE: 0 K/UL (ref 0–0.2)
BASOPHILS ABSOLUTE: <0.03 K/UL (ref 0–0.2)
BENZODIAZEPINE SCREEN, URINE: NEGATIVE
BETA GLOBULIN: 3.1 G/DL (ref 0.5–1.1)
BETA PERCENT: 37 % (ref 11–19)
BILIRUB SERPL-MCNC: 0.34 MG/DL (ref 0.3–1.2)
BILIRUB SERPL-MCNC: 0.46 MG/DL (ref 0.3–1.2)
BILIRUB SERPL-MCNC: 0.51 MG/DL (ref 0.3–1.2)
BILIRUBIN DIRECT: 0.15 MG/DL
BILIRUBIN DIRECT: 0.18 MG/DL
BILIRUBIN URINE: NEGATIVE
BILIRUBIN, INDIRECT: 0.28 MG/DL (ref 0–1)
BILIRUBIN, INDIRECT: 0.36 MG/DL (ref 0–1)
BLD PROD TYP BPU: NORMAL
BLOOD BANK BLOOD PRODUCT EXPIRATION DATE: NORMAL
BLOOD BANK ISBT PRODUCT BLOOD TYPE: 5100
BLOOD BANK PRODUCT CODE: NORMAL
BLOOD BANK SPECIMEN: ABNORMAL
BLOOD BANK UNIT TYPE AND RH: NORMAL
BPU ID: NORMAL
BUN BLDV-MCNC: 11 MG/DL (ref 8–23)
BUN BLDV-MCNC: 12 MG/DL (ref 8–23)
BUN BLDV-MCNC: 12 MG/DL (ref 8–23)
BUN BLDV-MCNC: 13 MG/DL (ref 8–23)
BUN BLDV-MCNC: 15 MG/DL (ref 8–23)
BUN BLDV-MCNC: 16 MG/DL (ref 8–23)
BUN BLDV-MCNC: 18 MG/DL (ref 8–23)
BUN BLDV-MCNC: 29 MG/DL (ref 8–23)
BUN/CREAT BLD: 18 (ref 9–20)
BUN/CREAT BLD: 22 (ref 9–20)
BUN/CREAT BLD: 22 (ref 9–20)
CALCIUM IONIZED: 1.38 MMOL/L (ref 1.13–1.33)
CALCIUM SERPL-MCNC: 8.3 MG/DL (ref 8.6–10.4)
CALCIUM SERPL-MCNC: 8.5 MG/DL (ref 8.6–10.4)
CALCIUM SERPL-MCNC: 8.5 MG/DL (ref 8.6–10.4)
CALCIUM SERPL-MCNC: 8.6 MG/DL (ref 8.6–10.4)
CALCIUM SERPL-MCNC: 9.5 MG/DL (ref 8.6–10.4)
CALCIUM SERPL-MCNC: 9.7 MG/DL (ref 8.6–10.4)
CALCIUM SERPL-MCNC: 9.8 MG/DL (ref 8.6–10.4)
CANNABINOID SCREEN URINE: NEGATIVE
CARBOXYHEMOGLOBIN: 4.7 % (ref 0–5)
CASTS UA: NORMAL /LPF (ref 0–8)
CHLORIDE BLD-SCNC: 100 MMOL/L (ref 98–107)
CHLORIDE BLD-SCNC: 101 MMOL/L (ref 98–107)
CHLORIDE BLD-SCNC: 102 MMOL/L (ref 98–107)
CHLORIDE BLD-SCNC: 103 MMOL/L (ref 98–107)
CHLORIDE BLD-SCNC: 105 MMOL/L (ref 98–107)
CHLORIDE BLD-SCNC: 111 MMOL/L (ref 98–107)
CHLORIDE BLD-SCNC: 116 MMOL/L (ref 98–107)
CHLORIDE BLD-SCNC: 133 MMOL/L (ref 98–107)
CHOLESTEROL/HDL RATIO: 1.4
CHOLESTEROL: 87 MG/DL
CO2: 17 MMOL/L (ref 20–31)
CO2: 22 MMOL/L (ref 20–31)
CO2: 23 MMOL/L (ref 20–31)
CO2: 24 MMOL/L (ref 20–31)
CO2: 25 MMOL/L (ref 20–31)
CO2: 28 MMOL/L (ref 20–31)
COCAINE METABOLITE, URINE: NEGATIVE
COLOR: YELLOW
CREAT SERPL-MCNC: 0.6 MG/DL (ref 0.5–0.9)
CREAT SERPL-MCNC: 0.63 MG/DL (ref 0.5–0.9)
CREAT SERPL-MCNC: 0.64 MG/DL (ref 0.5–0.9)
CREAT SERPL-MCNC: 0.66 MG/DL (ref 0.5–0.9)
CREAT SERPL-MCNC: 0.68 MG/DL (ref 0.5–0.9)
CREAT SERPL-MCNC: 0.83 MG/DL (ref 0.5–0.9)
CREAT SERPL-MCNC: 0.83 MG/DL (ref 0.5–0.9)
CREAT SERPL-MCNC: 1.21 MG/DL (ref 0.5–0.9)
CULTURE: NO GROWTH
DATE AND TIME: NORMAL
DIFFERENTIAL TYPE: ABNORMAL
DISPENSE STATUS BLOOD BANK: NORMAL
EKG ATRIAL RATE: 136 BPM
EKG ATRIAL RATE: 58 BPM
EKG P AXIS: 72 DEGREES
EKG P-R INTERVAL: 234 MS
EKG P-R INTERVAL: 360 MS
EKG Q-T INTERVAL: 316 MS
EKG Q-T INTERVAL: 486 MS
EKG QRS DURATION: 102 MS
EKG QRS DURATION: 82 MS
EKG QTC CALCULATION (BAZETT): 475 MS
EKG QTC CALCULATION (BAZETT): 477 MS
EKG R AXIS: 5 DEGREES
EKG R AXIS: 74 DEGREES
EKG T AXIS: -4 DEGREES
EKG T AXIS: 48 DEGREES
EKG VENTRICULAR RATE: 136 BPM
EKG VENTRICULAR RATE: 58 BPM
EOSINOPHILS RELATIVE PERCENT: 0 % (ref 1–4)
EOSINOPHILS RELATIVE PERCENT: 0 % (ref 1–4)
EOSINOPHILS RELATIVE PERCENT: 1 % (ref 1–4)
EOSINOPHILS RELATIVE PERCENT: 1 % (ref 1–4)
EOSINOPHILS RELATIVE PERCENT: 2 % (ref 1–4)
EOSINOPHILS RELATIVE PERCENT: 2 % (ref 1–4)
EPITHELIAL CELLS UA: NORMAL /HPF (ref 0–5)
EPL-TEG: 1.3 % (ref 0–15)
EPL-TEG: 1.7 % (ref 0–15)
ESTIMATED AVERAGE GLUCOSE: 103 MG/DL
ETHANOL PERCENT: 0.02 %
ETHANOL: 23 MG/DL
EXPIRATION DATE: NORMAL
FIBRINOGEN: 173 MG/DL (ref 140–420)
FIO2: 100
FIO2: 30
FIO2: 40
FIO2: 70
FIO2: 70
FIO2: ABNORMAL
FOLATE: >20 NG/ML
GAMMA GLOBULIN %: 4 % (ref 9–20)
GAMMA GLOBULIN: 0.4 G/DL (ref 0.5–1.5)
GFR AFRICAN AMERICAN: 53 ML/MIN
GFR AFRICAN AMERICAN: >60 ML/MIN
GFR NON-AFRICAN AMERICAN: 40 ML/MIN
GFR NON-AFRICAN AMERICAN: 44 ML/MIN
GFR NON-AFRICAN AMERICAN: 47 ML/MIN
GFR NON-AFRICAN AMERICAN: 49 ML/MIN
GFR NON-AFRICAN AMERICAN: >60 ML/MIN
GFR SERPL CREATININE-BSD FRML MDRD: 48 ML/MIN
GFR SERPL CREATININE-BSD FRML MDRD: 57 ML/MIN
GFR SERPL CREATININE-BSD FRML MDRD: 59 ML/MIN
GFR SERPL CREATININE-BSD FRML MDRD: >60 ML/MIN
GFR SERPL CREATININE-BSD FRML MDRD: >60 ML/MIN
GFR SERPL CREATININE-BSD FRML MDRD: ABNORMAL ML/MIN/{1.73_M2}
GFR SERPL CREATININE-BSD FRML MDRD: NORMAL ML/MIN/{1.73_M2}
GLUCOSE BLD-MCNC: 106 MG/DL (ref 74–100)
GLUCOSE BLD-MCNC: 108 MG/DL (ref 74–100)
GLUCOSE BLD-MCNC: 115 MG/DL (ref 74–100)
GLUCOSE BLD-MCNC: 116 MG/DL (ref 70–99)
GLUCOSE BLD-MCNC: 116 MG/DL (ref 74–100)
GLUCOSE BLD-MCNC: 117 MG/DL (ref 70–99)
GLUCOSE BLD-MCNC: 118 MG/DL (ref 70–99)
GLUCOSE BLD-MCNC: 122 MG/DL (ref 74–100)
GLUCOSE BLD-MCNC: 124 MG/DL (ref 74–100)
GLUCOSE BLD-MCNC: 125 MG/DL (ref 65–105)
GLUCOSE BLD-MCNC: 129 MG/DL (ref 70–99)
GLUCOSE BLD-MCNC: 130 MG/DL (ref 74–100)
GLUCOSE BLD-MCNC: 135 MG/DL (ref 74–100)
GLUCOSE BLD-MCNC: 142 MG/DL (ref 74–100)
GLUCOSE BLD-MCNC: 143 MG/DL (ref 70–99)
GLUCOSE BLD-MCNC: 145 MG/DL (ref 74–100)
GLUCOSE BLD-MCNC: 147 MG/DL (ref 65–105)
GLUCOSE BLD-MCNC: 156 MG/DL (ref 74–100)
GLUCOSE BLD-MCNC: 158 MG/DL (ref 74–100)
GLUCOSE BLD-MCNC: 161 MG/DL (ref 70–99)
GLUCOSE BLD-MCNC: 187 MG/DL (ref 74–100)
GLUCOSE BLD-MCNC: 196 MG/DL (ref 65–105)
GLUCOSE BLD-MCNC: 25 MG/DL (ref 65–105)
GLUCOSE BLD-MCNC: 67 MG/DL (ref 74–100)
GLUCOSE BLD-MCNC: 86 MG/DL (ref 74–100)
GLUCOSE BLD-MCNC: 91 MG/DL (ref 65–105)
GLUCOSE BLD-MCNC: 93 MG/DL (ref 70–99)
GLUCOSE BLD-MCNC: 94 MG/DL (ref 65–105)
GLUCOSE BLD-MCNC: 96 MG/DL (ref 70–99)
GLUCOSE BLD-MCNC: 97 MG/DL (ref 65–105)
GLUCOSE URINE: NEGATIVE
HBA1C MFR BLD: 5.2 % (ref 4–6)
HCG QUALITATIVE: NEGATIVE
HCO3 VENOUS: 22.2 MMOL/L (ref 24–30)
HCT VFR BLD CALC: 26.7 % (ref 36.3–47.1)
HCT VFR BLD CALC: 28.2 % (ref 36.3–47.1)
HCT VFR BLD CALC: 31.6 % (ref 36.3–47.1)
HCT VFR BLD CALC: 35.9 % (ref 36.3–47.1)
HCT VFR BLD CALC: 37 % (ref 36.3–47.1)
HCT VFR BLD CALC: 37.9 % (ref 36.3–47.1)
HCT VFR BLD CALC: 37.9 % (ref 36.3–47.1)
HCT VFR BLD CALC: 38.3 % (ref 36.3–47.1)
HDLC SERPL-MCNC: 63 MG/DL
HEMOGLOBIN: 11.7 G/DL (ref 11.9–15.1)
HEMOGLOBIN: 12.1 G/DL (ref 11.9–15.1)
HEMOGLOBIN: 12.2 G/DL (ref 11.9–15.1)
HEMOGLOBIN: 12.3 G/DL (ref 11.9–15.1)
HEMOGLOBIN: 12.6 G/DL (ref 11.9–15.1)
HEMOGLOBIN: 8.6 G/DL (ref 11.9–15.1)
HEMOGLOBIN: 9 G/DL (ref 11.9–15.1)
HEMOGLOBIN: 9.4 G/DL (ref 11.9–15.1)
HEPARIN THERAPY: ABNORMAL
HEPARIN THERAPY: ABNORMAL
IMMATURE GRANULOCYTES: 0 %
IMMATURE GRANULOCYTES: 1 %
IMMATURE GRANULOCYTES: 2 %
INR BLD: 1.1
INR BLD: 1.3
KETONES, URINE: NEGATIVE
KINETICS RAPID TEG: 1.6 MIN (ref 1–2)
KINETICS RAPID TEG: 4 MIN (ref 1–2)
LACTIC ACID, WHOLE BLOOD: 0.8 MMOL/L (ref 0.7–2.1)
LDL CHOLESTEROL: 14 MG/DL (ref 0–130)
LEUKOCYTE ESTERASE, URINE: NEGATIVE
LIPASE: 12 U/L (ref 13–60)
LV EF: 58 %
LVEF MODALITY: NORMAL
LY30 (LYSIS) TEG: 1.3 % (ref 0–8)
LY30 (LYSIS) TEG: 1.7 % (ref 0–8)
LYMPHOCYTES # BLD: 12 % (ref 24–43)
LYMPHOCYTES # BLD: 12 % (ref 24–43)
LYMPHOCYTES # BLD: 23 % (ref 24–43)
LYMPHOCYTES # BLD: 35 % (ref 24–43)
LYMPHOCYTES # BLD: 39 % (ref 24–44)
LYMPHOCYTES # BLD: 44 % (ref 24–43)
MA(MAX CLOT) RAPID TEG: 49.6 MM (ref 52–71)
MA(MAX CLOT) RAPID TEG: 61.4 MM (ref 52–71)
MAGNESIUM: 1.8 MG/DL (ref 1.6–2.6)
MAGNESIUM: 2.4 MG/DL (ref 1.6–2.6)
MAGNESIUM: 2.7 MG/DL (ref 1.6–2.6)
MCH RBC QN AUTO: 33.4 PG (ref 25.2–33.5)
MCH RBC QN AUTO: 34 PG (ref 25.2–33.5)
MCH RBC QN AUTO: 34.2 PG (ref 25.2–33.5)
MCH RBC QN AUTO: 34.7 PG (ref 25.2–33.5)
MCH RBC QN AUTO: 34.8 PG (ref 25.2–33.5)
MCH RBC QN AUTO: 34.8 PG (ref 25.2–33.5)
MCH RBC QN AUTO: 35 PG (ref 25.2–33.5)
MCH RBC QN AUTO: 35.2 PG (ref 25.2–33.5)
MCHC RBC AUTO-ENTMCNC: 29.7 G/DL (ref 28.4–34.8)
MCHC RBC AUTO-ENTMCNC: 31.9 G/DL (ref 28.4–34.8)
MCHC RBC AUTO-ENTMCNC: 32.2 G/DL (ref 28.4–34.8)
MCHC RBC AUTO-ENTMCNC: 32.2 G/DL (ref 28.4–34.8)
MCHC RBC AUTO-ENTMCNC: 32.5 G/DL (ref 28.4–34.8)
MCHC RBC AUTO-ENTMCNC: 32.6 G/DL (ref 28.4–34.8)
MCHC RBC AUTO-ENTMCNC: 32.7 G/DL (ref 28.4–34.8)
MCHC RBC AUTO-ENTMCNC: 32.9 G/DL (ref 28.4–34.8)
MCV RBC AUTO: 103 FL (ref 82.6–102.9)
MCV RBC AUTO: 104.4 FL (ref 82.6–102.9)
MCV RBC AUTO: 105.8 FL (ref 82.6–102.9)
MCV RBC AUTO: 106 FL (ref 82.6–102.9)
MCV RBC AUTO: 106.2 FL (ref 82.6–102.9)
MCV RBC AUTO: 108.1 FL (ref 82.6–102.9)
MCV RBC AUTO: 109.7 FL (ref 82.6–102.9)
MCV RBC AUTO: 118.4 FL (ref 82.6–102.9)
METHADONE SCREEN, URINE: NEGATIVE
MODE: ABNORMAL
MONOCYTES # BLD: 5 % (ref 3–12)
MONOCYTES # BLD: 6 % (ref 3–12)
MONOCYTES # BLD: 7 % (ref 3–12)
MONOCYTES # BLD: 9 % (ref 1–7)
MORPHOLOGY: ABNORMAL
MRSA, DNA, NASAL: NEGATIVE
NEGATIVE BASE EXCESS, ART: 1 (ref 0–2)
NEGATIVE BASE EXCESS, ART: 11 (ref 0–2)
NEGATIVE BASE EXCESS, ART: 13 (ref 0–2)
NEGATIVE BASE EXCESS, ART: 13 (ref 0–2)
NEGATIVE BASE EXCESS, ART: 2 (ref 0–2)
NEGATIVE BASE EXCESS, ART: 2 (ref 0–2)
NEGATIVE BASE EXCESS, ART: 3 (ref 0–2)
NEGATIVE BASE EXCESS, ART: 3 (ref 0–2)
NEGATIVE BASE EXCESS, ART: 4 (ref 0–2)
NEGATIVE BASE EXCESS, ART: 5 (ref 0–2)
NEGATIVE BASE EXCESS, ART: 5 (ref 0–2)
NEGATIVE BASE EXCESS, ART: 6 (ref 0–2)
NEGATIVE BASE EXCESS, VEN: 3.9 MMOL/L (ref 0–2)
NITRITE, URINE: NEGATIVE
NOTIFY: NORMAL
NRBC AUTOMATED: 0 PER 100 WBC
O2 DEVICE/FLOW/%: ABNORMAL
O2 SAT, VEN: 71.6 % (ref 60–85)
OPIATES, URINE: NEGATIVE
OXYCODONE SCREEN URINE: NEGATIVE
PARTIAL THROMBOPLASTIN TIME: 29.3 SEC (ref 20.5–30.5)
PARTIAL THROMBOPLASTIN TIME: 33.2 SEC (ref 26.8–34.8)
PATHOLOGIST: ABNORMAL
PATHOLOGIST: NORMAL
PATIENT TEMP: 37
PATIENT TEMP: 37.2
PATIENT TEMP: 37.5
PATIENT TEMP: 37.6
PATIENT TEMP: 37.6
PATIENT TEMP: 37.7
PATIENT TEMP: 37.8
PATIENT TEMP: 39
PCO2, VEN: 46.9 (ref 39–55)
PDW BLD-RTO: 12.6 % (ref 11.8–14.4)
PDW BLD-RTO: 12.7 % (ref 11.8–14.4)
PDW BLD-RTO: 12.9 % (ref 11.8–14.4)
PDW BLD-RTO: 12.9 % (ref 11.8–14.4)
PDW BLD-RTO: 13 % (ref 11.8–14.4)
PDW BLD-RTO: 13.2 % (ref 11.8–14.4)
PDW BLD-RTO: 13.5 % (ref 11.8–14.4)
PDW BLD-RTO: 13.6 % (ref 11.8–14.4)
PH UA: 6.5 (ref 5–8)
PH VENOUS: 7.3 (ref 7.32–7.42)
PHENCYCLIDINE, URINE: NEGATIVE
PHOSPHORUS: 2.9 MG/DL (ref 2.6–4.5)
PHOSPHORUS: 3.8 MG/DL (ref 2.6–4.5)
PLATELET # BLD: 127 K/UL (ref 138–453)
PLATELET # BLD: 166 K/UL (ref 138–453)
PLATELET # BLD: 168 K/UL (ref 138–453)
PLATELET # BLD: 176 K/UL (ref 138–453)
PLATELET # BLD: 180 K/UL (ref 138–453)
PLATELET # BLD: 180 K/UL (ref 138–453)
PLATELET # BLD: 181 K/UL (ref 138–453)
PLATELET # BLD: ABNORMAL K/UL (ref 138–453)
PLATELET ESTIMATE: ABNORMAL
PLATELET, FLUORESCENCE: 153 K/UL (ref 138–453)
PLATELET, IMMATURE FRACTION: 2.1 % (ref 1.1–10.3)
PMV BLD AUTO: 10.3 FL (ref 8.1–13.5)
PMV BLD AUTO: 10.8 FL (ref 8.1–13.5)
PMV BLD AUTO: 10.8 FL (ref 8.1–13.5)
PMV BLD AUTO: 9.4 FL (ref 8.1–13.5)
PMV BLD AUTO: 9.7 FL (ref 8.1–13.5)
PMV BLD AUTO: 9.7 FL (ref 8.1–13.5)
PMV BLD AUTO: 9.8 FL (ref 8.1–13.5)
PO2, VEN: 40.9 (ref 30–50)
POC BUN: 10 MG/DL (ref 8–26)
POC BUN: 13 MG/DL (ref 8–26)
POC BUN: 18 MG/DL (ref 8–26)
POC BUN: 21 MG/DL (ref 8–26)
POC BUN: 27 MG/DL (ref 8–26)
POC CHLORIDE: 109 MMOL/L (ref 98–107)
POC CHLORIDE: 119 MMOL/L (ref 98–107)
POC CHLORIDE: 124 MMOL/L (ref 98–107)
POC CHLORIDE: 128 MMOL/L (ref 98–107)
POC CHLORIDE: 135 MMOL/L (ref 98–107)
POC CREATININE: 0.79 MG/DL (ref 0.51–1.19)
POC CREATININE: 0.79 MG/DL (ref 0.51–1.19)
POC CREATININE: 1.1 MG/DL (ref 0.51–1.19)
POC CREATININE: 1.15 MG/DL (ref 0.51–1.19)
POC CREATININE: 1.33 MG/DL (ref 0.51–1.19)
POC HCO3: 19.7 MMOL/L (ref 21–28)
POC HCO3: 20.4 MMOL/L (ref 21–28)
POC HCO3: 20.8 MMOL/L (ref 21–28)
POC HCO3: 22.8 MMOL/L (ref 21–28)
POC HCO3: 22.9 MMOL/L (ref 21–28)
POC HCO3: 23 MMOL/L (ref 21–28)
POC HCO3: 23 MMOL/L (ref 21–28)
POC HCO3: 23.1 MMOL/L (ref 21–28)
POC HCO3: 23.4 MMOL/L (ref 21–28)
POC HCO3: 24.2 MMOL/L (ref 21–28)
POC HCO3: 25.2 MMOL/L (ref 21–28)
POC HCO3: 25.8 MMOL/L (ref 21–28)
POC HCO3: 26.3 MMOL/L (ref 21–28)
POC HCO3: 26.3 MMOL/L (ref 21–28)
POC HCO3: 27.1 MMOL/L (ref 21–28)
POC HEMATOCRIT: 25 % (ref 36–46)
POC HEMATOCRIT: 27 % (ref 36–46)
POC HEMATOCRIT: 27 % (ref 36–46)
POC HEMATOCRIT: 30 % (ref 36–46)
POC HEMATOCRIT: 35 % (ref 36–46)
POC HEMOGLOBIN: 10.2 G/DL (ref 12–16)
POC HEMOGLOBIN: 11.7 G/DL (ref 12–16)
POC HEMOGLOBIN: 8.3 G/DL (ref 12–16)
POC HEMOGLOBIN: 9.2 G/DL (ref 12–16)
POC HEMOGLOBIN: 9.3 G/DL (ref 12–16)
POC IONIZED CALCIUM: 1.23 MMOL/L (ref 1.15–1.33)
POC IONIZED CALCIUM: 1.26 MMOL/L (ref 1.15–1.33)
POC IONIZED CALCIUM: 1.3 MMOL/L (ref 1.15–1.33)
POC IONIZED CALCIUM: 1.33 MMOL/L (ref 1.15–1.33)
POC IONIZED CALCIUM: 1.39 MMOL/L (ref 1.15–1.33)
POC LACTIC ACID: 0.53 MMOL/L (ref 0.56–1.39)
POC LACTIC ACID: 0.55 MMOL/L (ref 0.56–1.39)
POC LACTIC ACID: 0.62 MMOL/L (ref 0.56–1.39)
POC LACTIC ACID: 0.66 MMOL/L (ref 0.56–1.39)
POC LACTIC ACID: 0.8 MMOL/L (ref 0.56–1.39)
POC LACTIC ACID: 1.06 MMOL/L (ref 0.56–1.39)
POC LACTIC ACID: 1.12 MMOL/L (ref 0.56–1.39)
POC LACTIC ACID: 1.15 MMOL/L (ref 0.56–1.39)
POC LACTIC ACID: 1.2 MMOL/L (ref 0.56–1.39)
POC LACTIC ACID: 1.21 MMOL/L (ref 0.56–1.39)
POC LACTIC ACID: 1.42 MMOL/L (ref 0.56–1.39)
POC LACTIC ACID: 1.65 MMOL/L (ref 0.56–1.39)
POC LACTIC ACID: 1.83 MMOL/L (ref 0.56–1.39)
POC LACTIC ACID: 3.22 MMOL/L (ref 0.56–1.39)
POC LACTIC ACID: 5.39 MMOL/L (ref 0.56–1.39)
POC O2 SATURATION: 100 % (ref 94–98)
POC O2 SATURATION: 61 % (ref 94–98)
POC O2 SATURATION: 77 % (ref 94–98)
POC O2 SATURATION: 83 % (ref 94–98)
POC O2 SATURATION: 84 % (ref 94–98)
POC O2 SATURATION: 89 % (ref 94–98)
POC O2 SATURATION: 93 % (ref 94–98)
POC O2 SATURATION: 94 % (ref 94–98)
POC O2 SATURATION: 95 % (ref 94–98)
POC O2 SATURATION: 96 % (ref 94–98)
POC O2 SATURATION: 96 % (ref 94–98)
POC O2 SATURATION: 99 % (ref 94–98)
POC O2 SATURATION: 99 % (ref 94–98)
POC PCO2 TEMP: 50 MM HG
POC PCO2 TEMP: 51 MM HG
POC PCO2 TEMP: 57 MM HG
POC PCO2 TEMP: 60 MM HG
POC PCO2 TEMP: 61 MM HG
POC PCO2 TEMP: 90 MM HG
POC PCO2: 100.3 MM HG (ref 35–48)
POC PCO2: 41.9 MM HG (ref 35–48)
POC PCO2: 46.7 MM HG (ref 35–48)
POC PCO2: 47.5 MM HG (ref 35–48)
POC PCO2: 48.3 MM HG (ref 35–48)
POC PCO2: 48.7 MM HG (ref 35–48)
POC PCO2: 51.3 MM HG (ref 35–48)
POC PCO2: 51.9 MM HG (ref 35–48)
POC PCO2: 53.6 MM HG (ref 35–48)
POC PCO2: 54 MM HG (ref 35–48)
POC PCO2: 56.2 MM HG (ref 35–48)
POC PCO2: 58.5 MM HG (ref 35–48)
POC PCO2: 59.3 MM HG (ref 35–48)
POC PCO2: 86.4 MM HG (ref 35–48)
POC PCO2: 86.8 MM HG (ref 35–48)
POC PH TEMP: 6.98
POC PH TEMP: 7.18
POC PH TEMP: 7.19
POC PH TEMP: 7.21
POC PH TEMP: 7.27
POC PH TEMP: 7.27
POC PH: 6.92 (ref 7.35–7.45)
POC PH: 6.97 (ref 7.35–7.45)
POC PH: 6.99 (ref 7.35–7.45)
POC PH: 7.19 (ref 7.35–7.45)
POC PH: 7.2 (ref 7.35–7.45)
POC PH: 7.22 (ref 7.35–7.45)
POC PH: 7.28 (ref 7.35–7.45)
POC PH: 7.29 (ref 7.35–7.45)
POC PH: 7.3 (ref 7.35–7.45)
POC PH: 7.33 (ref 7.35–7.45)
POC PH: 7.34 (ref 7.35–7.45)
POC PH: 7.34 (ref 7.35–7.45)
POC PH: 7.36 (ref 7.35–7.45)
POC PO2 TEMP: 101 MM HG
POC PO2 TEMP: 218 MM HG
POC PO2 TEMP: 349 MM HG
POC PO2 TEMP: 64 MM HG
POC PO2 TEMP: 79 MM HG
POC PO2 TEMP: 96 MM HG
POC PO2: 125 MM HG (ref 83–108)
POC PO2: 143.4 MM HG (ref 83–108)
POC PO2: 215.4 MM HG (ref 83–108)
POC PO2: 324.2 MM HG (ref 83–108)
POC PO2: 346.1 MM HG (ref 83–108)
POC PO2: 53.3 MM HG (ref 83–108)
POC PO2: 61.6 MM HG (ref 83–108)
POC PO2: 64.8 MM HG (ref 83–108)
POC PO2: 66.3 MM HG (ref 83–108)
POC PO2: 71.8 MM HG (ref 83–108)
POC PO2: 74.1 MM HG (ref 83–108)
POC PO2: 74.4 MM HG (ref 83–108)
POC PO2: 89.4 MM HG (ref 83–108)
POC PO2: 91.6 MM HG (ref 83–108)
POC PO2: 91.8 MM HG (ref 83–108)
POC POTASSIUM: 3.9 MMOL/L (ref 3.5–4.5)
POC POTASSIUM: 4.1 MMOL/L (ref 3.5–4.5)
POC POTASSIUM: 4.2 MMOL/L (ref 3.5–4.5)
POC SODIUM: 143 MMOL/L (ref 138–146)
POC SODIUM: 152 MMOL/L (ref 138–146)
POC SODIUM: 156 MMOL/L (ref 138–146)
POC SODIUM: 157 MMOL/L (ref 138–146)
POC SODIUM: 161 MMOL/L (ref 138–146)
POC TCO2: 23 MMOL/L (ref 22–30)
POC TCO2: 24 MMOL/L (ref 22–30)
POC TCO2: 24 MMOL/L (ref 22–30)
POC TCO2: 25 MMOL/L (ref 22–30)
POC TCO2: 28 MMOL/L (ref 22–30)
POSITIVE BASE EXCESS, ART: 0 (ref 0–3)
POTASSIUM SERPL-SCNC: 3.6 MMOL/L (ref 3.7–5.3)
POTASSIUM SERPL-SCNC: 3.8 MMOL/L (ref 3.7–5.3)
POTASSIUM SERPL-SCNC: 3.9 MMOL/L (ref 3.7–5.3)
POTASSIUM SERPL-SCNC: 4.1 MMOL/L (ref 3.7–5.3)
POTASSIUM SERPL-SCNC: 4.1 MMOL/L (ref 3.7–5.3)
POTASSIUM SERPL-SCNC: 4.2 MMOL/L (ref 3.7–5.3)
POTASSIUM SERPL-SCNC: 4.2 MMOL/L (ref 3.7–5.3)
POTASSIUM SERPL-SCNC: 4.3 MMOL/L (ref 3.7–5.3)
PROCALCITONIN: 5.25 NG/ML
PROTEIN ELECTROPHORESIS, SERUM: ABNORMAL
PROTEIN UA: ABNORMAL
PROTHROMBIN TIME: 11.5 SEC (ref 9.1–12.3)
PROTHROMBIN TIME: 15.6 SEC (ref 11.5–14.2)
RBC # BLD: 2.47 M/UL (ref 3.95–5.11)
RBC # BLD: 2.57 M/UL (ref 3.95–5.11)
RBC # BLD: 2.67 M/UL (ref 3.95–5.11)
RBC # BLD: 3.44 M/UL (ref 3.95–5.11)
RBC # BLD: 3.49 M/UL (ref 3.95–5.11)
RBC # BLD: 3.57 M/UL (ref 3.95–5.11)
RBC # BLD: 3.62 M/UL (ref 3.95–5.11)
RBC # BLD: 3.68 M/UL (ref 3.95–5.11)
RBC # BLD: ABNORMAL 10*6/UL
RBC UA: NORMAL /HPF (ref 0–4)
REACTION TIME RAPID TEG: 0.8 MIN (ref 0–1)
REACTION TIME RAPID TEG: 0.9 MIN (ref 0–1)
READ BACK: YES
SAMPLE SITE: ABNORMAL
SARS-COV-2, RAPID: NOT DETECTED
SEG NEUTROPHILS: 47 % (ref 36–65)
SEG NEUTROPHILS: 50 % (ref 36–66)
SEG NEUTROPHILS: 57 % (ref 36–65)
SEG NEUTROPHILS: 69 % (ref 36–65)
SEG NEUTROPHILS: 80 % (ref 36–65)
SEG NEUTROPHILS: 82 % (ref 36–65)
SEGMENTED NEUTROPHILS ABSOLUTE COUNT: 0.5 K/UL (ref 1.8–7.7)
SEGMENTED NEUTROPHILS ABSOLUTE COUNT: 2.74 K/UL (ref 1.5–8.1)
SEGMENTED NEUTROPHILS ABSOLUTE COUNT: 2.77 K/UL (ref 1.5–8.1)
SEGMENTED NEUTROPHILS ABSOLUTE COUNT: 5.27 K/UL (ref 1.5–8.1)
SEGMENTED NEUTROPHILS ABSOLUTE COUNT: 7.38 K/UL (ref 1.5–8.1)
SEGMENTED NEUTROPHILS ABSOLUTE COUNT: 8.06 K/UL (ref 1.5–8.1)
SERUM IFX INTERP: NORMAL
SODIUM BLD-SCNC: 136 MMOL/L (ref 135–144)
SODIUM BLD-SCNC: 140 MMOL/L (ref 135–144)
SODIUM BLD-SCNC: 140 MMOL/L (ref 135–144)
SODIUM BLD-SCNC: 141 MMOL/L (ref 135–144)
SODIUM BLD-SCNC: 143 MMOL/L (ref 135–144)
SODIUM BLD-SCNC: 145 MMOL/L (ref 135–144)
SODIUM BLD-SCNC: 146 MMOL/L (ref 135–144)
SODIUM BLD-SCNC: 146 MMOL/L (ref 135–144)
SODIUM BLD-SCNC: 147 MMOL/L (ref 135–144)
SODIUM BLD-SCNC: 149 MMOL/L (ref 135–144)
SODIUM BLD-SCNC: 150 MMOL/L (ref 135–144)
SODIUM BLD-SCNC: 151 MMOL/L (ref 135–144)
SODIUM BLD-SCNC: 152 MMOL/L (ref 135–144)
SODIUM BLD-SCNC: 154 MMOL/L (ref 135–144)
SODIUM BLD-SCNC: 155 MMOL/L (ref 135–144)
SODIUM BLD-SCNC: 158 MMOL/L (ref 135–144)
SODIUM BLD-SCNC: 159 MMOL/L (ref 135–144)
SPECIFIC GRAVITY UA: 1.01 (ref 1–1.03)
SPECIMEN DESCRIPTION: NORMAL
TEG COMMENT: ABNORMAL
TEG COMMENT: ABNORMAL
TEST INFORMATION: NORMAL
TOTAL PROT. SUM,%: 100 % (ref 98–102)
TOTAL PROT. SUM: 8.4 G/DL (ref 6.3–8.2)
TOTAL PROTEIN: 6.7 G/DL (ref 6.4–8.3)
TOTAL PROTEIN: 7.4 G/DL (ref 6.4–8.3)
TOTAL PROTEIN: 8.3 G/DL (ref 6.4–8.3)
TOTAL PROTEIN: 8.6 G/DL (ref 6.4–8.3)
TRANSFUSION STATUS: NORMAL
TRIGL SERPL-MCNC: 49 MG/DL
TROPONIN, HIGH SENSITIVITY: 11 NG/L (ref 0–14)
TROPONIN, HIGH SENSITIVITY: 25 NG/L (ref 0–14)
TURBIDITY: CLEAR
UNIT DIVISION: 0
UNIT ISSUE DATE/TIME: NORMAL
URINE HGB: NEGATIVE
UROBILINOGEN, URINE: NORMAL
VITAMIN B-12: 619 PG/ML (ref 232–1245)
WBC # BLD: 1 K/UL (ref 3.5–11.3)
WBC # BLD: 1.1 K/UL (ref 3.5–11.3)
WBC # BLD: 12.8 K/UL (ref 3.5–11.3)
WBC # BLD: 4.9 K/UL (ref 3.5–11.3)
WBC # BLD: 6 K/UL (ref 3.5–11.3)
WBC # BLD: 7.2 K/UL (ref 3.5–11.3)
WBC # BLD: 7.7 K/UL (ref 3.5–11.3)
WBC # BLD: 9.2 K/UL (ref 3.5–11.3)
WBC # BLD: 9.8 K/UL (ref 3.5–11.3)
WBC # BLD: ABNORMAL 10*3/UL
WBC UA: NORMAL /HPF (ref 0–5)

## 2022-01-01 PROCEDURE — G8427 DOCREV CUR MEDS BY ELIG CLIN: HCPCS | Performed by: NEUROMUSCULOSKELETAL MEDICINE, SPORTS MEDICINE

## 2022-01-01 PROCEDURE — 82746 ASSAY OF FOLIC ACID SERUM: CPT

## 2022-01-01 PROCEDURE — 87635 SARS-COV-2 COVID-19 AMP PRB: CPT

## 2022-01-01 PROCEDURE — 6370000000 HC RX 637 (ALT 250 FOR IP): Performed by: STUDENT IN AN ORGANIZED HEALTH CARE EDUCATION/TRAINING PROGRAM

## 2022-01-01 PROCEDURE — 37799 UNLISTED PX VASCULAR SURGERY: CPT

## 2022-01-01 PROCEDURE — 83605 ASSAY OF LACTIC ACID: CPT

## 2022-01-01 PROCEDURE — 3017F COLORECTAL CA SCREEN DOC REV: CPT | Performed by: NEUROMUSCULOSKELETAL MEDICINE, SPORTS MEDICINE

## 2022-01-01 PROCEDURE — 2500000003 HC RX 250 WO HCPCS

## 2022-01-01 PROCEDURE — 84520 ASSAY OF UREA NITROGEN: CPT

## 2022-01-01 PROCEDURE — 6370000000 HC RX 637 (ALT 250 FOR IP): Performed by: EMERGENCY MEDICINE

## 2022-01-01 PROCEDURE — 2000000000 HC ICU R&B

## 2022-01-01 PROCEDURE — 94003 VENT MGMT INPAT SUBQ DAY: CPT

## 2022-01-01 PROCEDURE — 36430 TRANSFUSION BLD/BLD COMPNT: CPT

## 2022-01-01 PROCEDURE — 2500000003 HC RX 250 WO HCPCS: Performed by: STUDENT IN AN ORGANIZED HEALTH CARE EDUCATION/TRAINING PROGRAM

## 2022-01-01 PROCEDURE — 85210 CLOT FACTOR II PROTHROM SPEC: CPT

## 2022-01-01 PROCEDURE — P9073 PLATELETS PHERESIS PATH REDU: HCPCS

## 2022-01-01 PROCEDURE — 94640 AIRWAY INHALATION TREATMENT: CPT

## 2022-01-01 PROCEDURE — 77067 SCR MAMMO BI INCL CAD: CPT

## 2022-01-01 PROCEDURE — 99284 EMERGENCY DEPT VISIT MOD MDM: CPT

## 2022-01-01 PROCEDURE — 2580000003 HC RX 258: Performed by: STUDENT IN AN ORGANIZED HEALTH CARE EDUCATION/TRAINING PROGRAM

## 2022-01-01 PROCEDURE — 84145 PROCALCITONIN (PCT): CPT

## 2022-01-01 PROCEDURE — 82140 ASSAY OF AMMONIA: CPT

## 2022-01-01 PROCEDURE — 85730 THROMBOPLASTIN TIME PARTIAL: CPT

## 2022-01-01 PROCEDURE — 6360000002 HC RX W HCPCS

## 2022-01-01 PROCEDURE — 82565 ASSAY OF CREATININE: CPT

## 2022-01-01 PROCEDURE — 86901 BLOOD TYPING SEROLOGIC RH(D): CPT

## 2022-01-01 PROCEDURE — 6360000002 HC RX W HCPCS: Performed by: STUDENT IN AN ORGANIZED HEALTH CARE EDUCATION/TRAINING PROGRAM

## 2022-01-01 PROCEDURE — 74018 RADEX ABDOMEN 1 VIEW: CPT

## 2022-01-01 PROCEDURE — 85055 RETICULATED PLATELET ASSAY: CPT

## 2022-01-01 PROCEDURE — APPNB30 APP NON BILLABLE TIME 0-30 MINS: Performed by: REGISTERED NURSE

## 2022-01-01 PROCEDURE — 80048 BASIC METABOLIC PNL TOTAL CA: CPT

## 2022-01-01 PROCEDURE — 87641 MR-STAPH DNA AMP PROBE: CPT

## 2022-01-01 PROCEDURE — 99212 OFFICE O/P EST SF 10 MIN: CPT | Performed by: NEUROMUSCULOSKELETAL MEDICINE, SPORTS MEDICINE

## 2022-01-01 PROCEDURE — 82947 ASSAY GLUCOSE BLOOD QUANT: CPT

## 2022-01-01 PROCEDURE — 2580000003 HC RX 258: Performed by: EMERGENCY MEDICINE

## 2022-01-01 PROCEDURE — 70450 CT HEAD/BRAIN W/O DYE: CPT

## 2022-01-01 PROCEDURE — 84295 ASSAY OF SERUM SODIUM: CPT

## 2022-01-01 PROCEDURE — 36415 COLL VENOUS BLD VENIPUNCTURE: CPT

## 2022-01-01 PROCEDURE — 85390 FIBRINOLYSINS SCREEN I&R: CPT

## 2022-01-01 PROCEDURE — 99222 1ST HOSP IP/OBS MODERATE 55: CPT | Performed by: PSYCHIATRY & NEUROLOGY

## 2022-01-01 PROCEDURE — 71045 X-RAY EXAM CHEST 1 VIEW: CPT

## 2022-01-01 PROCEDURE — 85014 HEMATOCRIT: CPT

## 2022-01-01 PROCEDURE — 84484 ASSAY OF TROPONIN QUANT: CPT

## 2022-01-01 PROCEDURE — 70498 CT ANGIOGRAPHY NECK: CPT

## 2022-01-01 PROCEDURE — 85610 PROTHROMBIN TIME: CPT

## 2022-01-01 PROCEDURE — 85027 COMPLETE CBC AUTOMATED: CPT

## 2022-01-01 PROCEDURE — 1123F ACP DISCUSS/DSCN MKR DOCD: CPT | Performed by: NEUROMUSCULOSKELETAL MEDICINE, SPORTS MEDICINE

## 2022-01-01 PROCEDURE — 96365 THER/PROPH/DIAG IV INF INIT: CPT

## 2022-01-01 PROCEDURE — 84425 ASSAY OF VITAMIN B-1: CPT

## 2022-01-01 PROCEDURE — 82803 BLOOD GASES ANY COMBINATION: CPT

## 2022-01-01 PROCEDURE — G0480 DRUG TEST DEF 1-7 CLASSES: HCPCS

## 2022-01-01 PROCEDURE — 85025 COMPLETE CBC W/AUTO DIFF WBC: CPT

## 2022-01-01 PROCEDURE — 96375 TX/PRO/DX INJ NEW DRUG ADDON: CPT

## 2022-01-01 PROCEDURE — 51702 INSERT TEMP BLADDER CATH: CPT

## 2022-01-01 PROCEDURE — 93005 ELECTROCARDIOGRAM TRACING: CPT | Performed by: STUDENT IN AN ORGANIZED HEALTH CARE EDUCATION/TRAINING PROGRAM

## 2022-01-01 PROCEDURE — 82607 VITAMIN B-12: CPT

## 2022-01-01 PROCEDURE — 85384 FIBRINOGEN ACTIVITY: CPT

## 2022-01-01 PROCEDURE — G8420 CALC BMI NORM PARAMETERS: HCPCS | Performed by: NEUROMUSCULOSKELETAL MEDICINE, SPORTS MEDICINE

## 2022-01-01 PROCEDURE — 96374 THER/PROPH/DIAG INJ IV PUSH: CPT

## 2022-01-01 PROCEDURE — 86850 RBC ANTIBODY SCREEN: CPT

## 2022-01-01 PROCEDURE — 70491 CT SOFT TISSUE NECK W/DYE: CPT

## 2022-01-01 PROCEDURE — 82805 BLOOD GASES W/O2 SATURATION: CPT

## 2022-01-01 PROCEDURE — 0BH18EZ INSERTION OF ENDOTRACHEAL AIRWAY INTO TRACHEA, VIA NATURAL OR ARTIFICIAL OPENING ENDOSCOPIC: ICD-10-PCS | Performed by: SURGERY

## 2022-01-01 PROCEDURE — 93010 ELECTROCARDIOGRAM REPORT: CPT | Performed by: INTERNAL MEDICINE

## 2022-01-01 PROCEDURE — 84100 ASSAY OF PHOSPHORUS: CPT

## 2022-01-01 PROCEDURE — 83690 ASSAY OF LIPASE: CPT

## 2022-01-01 PROCEDURE — 80076 HEPATIC FUNCTION PANEL: CPT

## 2022-01-01 PROCEDURE — 87086 URINE CULTURE/COLONY COUNT: CPT

## 2022-01-01 PROCEDURE — 83735 ASSAY OF MAGNESIUM: CPT

## 2022-01-01 PROCEDURE — 86334 IMMUNOFIX E-PHORESIS SERUM: CPT

## 2022-01-01 PROCEDURE — 94002 VENT MGMT INPAT INIT DAY: CPT

## 2022-01-01 PROCEDURE — 93306 TTE W/DOPPLER COMPLETE: CPT

## 2022-01-01 PROCEDURE — 3209999900 CT LUMBAR SPINE TRAUMA RECONSTRUCTION

## 2022-01-01 PROCEDURE — 81001 URINALYSIS AUTO W/SCOPE: CPT

## 2022-01-01 PROCEDURE — 5A1945Z RESPIRATORY VENTILATION, 24-96 CONSECUTIVE HOURS: ICD-10-PCS | Performed by: STUDENT IN AN ORGANIZED HEALTH CARE EDUCATION/TRAINING PROGRAM

## 2022-01-01 PROCEDURE — 2700000000 HC OXYGEN THERAPY PER DAY

## 2022-01-01 PROCEDURE — 99213 OFFICE O/P EST LOW 20 MIN: CPT | Performed by: NEUROMUSCULOSKELETAL MEDICINE, SPORTS MEDICINE

## 2022-01-01 PROCEDURE — 82330 ASSAY OF CALCIUM: CPT

## 2022-01-01 PROCEDURE — 84155 ASSAY OF PROTEIN SERUM: CPT

## 2022-01-01 PROCEDURE — 99232 SBSQ HOSP IP/OBS MODERATE 35: CPT | Performed by: PSYCHIATRY & NEUROLOGY

## 2022-01-01 PROCEDURE — G8484 FLU IMMUNIZE NO ADMIN: HCPCS | Performed by: NEUROMUSCULOSKELETAL MEDICINE, SPORTS MEDICINE

## 2022-01-01 PROCEDURE — 3209999900 CT THORACIC SPINE TRAUMA RECONSTRUCTION

## 2022-01-01 PROCEDURE — 94761 N-INVAS EAR/PLS OXIMETRY MLT: CPT

## 2022-01-01 PROCEDURE — APPSS30 APP SPLIT SHARED TIME 16-30 MINUTES: Performed by: NURSE PRACTITIONER

## 2022-01-01 PROCEDURE — 84703 CHORIONIC GONADOTROPIN ASSAY: CPT

## 2022-01-01 PROCEDURE — APPNB15 APP NON BILLABLE TIME 0-15 MINS: Performed by: NURSE PRACTITIONER

## 2022-01-01 PROCEDURE — 84165 PROTEIN E-PHORESIS SERUM: CPT

## 2022-01-01 PROCEDURE — 70551 MRI BRAIN STEM W/O DYE: CPT

## 2022-01-01 PROCEDURE — 36556 INSERT NON-TUNNEL CV CATH: CPT

## 2022-01-01 PROCEDURE — 02HV33Z INSERTION OF INFUSION DEVICE INTO SUPERIOR VENA CAVA, PERCUTANEOUS APPROACH: ICD-10-PCS | Performed by: STUDENT IN AN ORGANIZED HEALTH CARE EDUCATION/TRAINING PROGRAM

## 2022-01-01 PROCEDURE — 6810039001 HC L1 TRAUMA PRIORITY

## 2022-01-01 PROCEDURE — 83036 HEMOGLOBIN GLYCOSYLATED A1C: CPT

## 2022-01-01 PROCEDURE — 1090F PRES/ABSN URINE INCON ASSESS: CPT | Performed by: NEUROMUSCULOSKELETAL MEDICINE, SPORTS MEDICINE

## 2022-01-01 PROCEDURE — 6360000004 HC RX CONTRAST MEDICATION: Performed by: EMERGENCY MEDICINE

## 2022-01-01 PROCEDURE — 99222 1ST HOSP IP/OBS MODERATE 55: CPT | Performed by: FAMILY MEDICINE

## 2022-01-01 PROCEDURE — G8400 PT W/DXA NO RESULTS DOC: HCPCS | Performed by: NEUROMUSCULOSKELETAL MEDICINE, SPORTS MEDICINE

## 2022-01-01 PROCEDURE — 80051 ELECTROLYTE PANEL: CPT

## 2022-01-01 PROCEDURE — 6360000002 HC RX W HCPCS: Performed by: EMERGENCY MEDICINE

## 2022-01-01 PROCEDURE — 74176 CT ABD & PELVIS W/O CONTRAST: CPT

## 2022-01-01 PROCEDURE — 1036F TOBACCO NON-USER: CPT | Performed by: NEUROMUSCULOSKELETAL MEDICINE, SPORTS MEDICINE

## 2022-01-01 PROCEDURE — 36620 INSERTION CATHETER ARTERY: CPT

## 2022-01-01 PROCEDURE — 80053 COMPREHEN METABOLIC PANEL: CPT

## 2022-01-01 PROCEDURE — 99285 EMERGENCY DEPT VISIT HI MDM: CPT

## 2022-01-01 PROCEDURE — 6370000000 HC RX 637 (ALT 250 FOR IP): Performed by: NURSE PRACTITIONER

## 2022-01-01 PROCEDURE — 85048 AUTOMATED LEUKOCYTE COUNT: CPT

## 2022-01-01 PROCEDURE — 6360000004 HC RX CONTRAST MEDICATION: Performed by: FAMILY MEDICINE

## 2022-01-01 PROCEDURE — 4040F PNEUMOC VAC/ADMIN/RCVD: CPT | Performed by: NEUROMUSCULOSKELETAL MEDICINE, SPORTS MEDICINE

## 2022-01-01 PROCEDURE — 80307 DRUG TEST PRSMV CHEM ANLYZR: CPT

## 2022-01-01 PROCEDURE — 80061 LIPID PANEL: CPT

## 2022-01-01 PROCEDURE — 2500000003 HC RX 250 WO HCPCS: Performed by: EMERGENCY MEDICINE

## 2022-01-01 PROCEDURE — 72125 CT NECK SPINE W/O DYE: CPT

## 2022-01-01 PROCEDURE — 86900 BLOOD TYPING SEROLOGIC ABO: CPT

## 2022-01-01 RX ORDER — TIZANIDINE 4 MG/1
4 TABLET ORAL EVERY 8 HOURS PRN
COMMUNITY

## 2022-01-01 RX ORDER — LABETALOL HYDROCHLORIDE 5 MG/ML
10 INJECTION, SOLUTION INTRAVENOUS ONCE
Status: DISCONTINUED | OUTPATIENT
Start: 2022-01-01 | End: 2022-01-01

## 2022-01-01 RX ORDER — SODIUM CHLORIDE 0.9 % (FLUSH) 0.9 %
5-40 SYRINGE (ML) INJECTION EVERY 12 HOURS SCHEDULED
Status: DISCONTINUED | OUTPATIENT
Start: 2022-01-01 | End: 2022-01-01 | Stop reason: HOSPADM

## 2022-01-01 RX ORDER — 3% SODIUM CHLORIDE 3 G/100ML
250 INJECTION, SOLUTION INTRAVENOUS ONCE
Status: COMPLETED | OUTPATIENT
Start: 2022-01-01 | End: 2022-01-01

## 2022-01-01 RX ORDER — LORAZEPAM 2 MG/ML
INJECTION INTRAMUSCULAR
Status: COMPLETED
Start: 2022-01-01 | End: 2022-01-01

## 2022-01-01 RX ORDER — DEXTROSE MONOHYDRATE 25 G/50ML
50 INJECTION, SOLUTION INTRAVENOUS ONCE
Status: DISCONTINUED | OUTPATIENT
Start: 2022-01-01 | End: 2022-01-01 | Stop reason: HOSPADM

## 2022-01-01 RX ORDER — TOPIRAMATE 25 MG/1
50 TABLET ORAL 2 TIMES DAILY
COMMUNITY

## 2022-01-01 RX ORDER — NOREPINEPHRINE BITARTRATE 1 MG/ML
INJECTION, SOLUTION INTRAVENOUS
Status: COMPLETED
Start: 2022-01-01 | End: 2022-01-01

## 2022-01-01 RX ORDER — MORPHINE SULFATE 4 MG/ML
INJECTION, SOLUTION INTRAMUSCULAR; INTRAVENOUS
Status: COMPLETED
Start: 2022-01-01 | End: 2022-01-01

## 2022-01-01 RX ORDER — LEVETIRACETAM 100 MG/ML
500 SOLUTION ORAL 2 TIMES DAILY
Status: DISCONTINUED | OUTPATIENT
Start: 2022-01-01 | End: 2022-01-01 | Stop reason: HOSPADM

## 2022-01-01 RX ORDER — FENTANYL CITRATE 50 UG/ML
100 INJECTION, SOLUTION INTRAMUSCULAR; INTRAVENOUS ONCE
Status: COMPLETED | OUTPATIENT
Start: 2022-01-01 | End: 2022-01-01

## 2022-01-01 RX ORDER — FENTANYL CITRATE 50 UG/ML
50 INJECTION, SOLUTION INTRAMUSCULAR; INTRAVENOUS ONCE
Status: COMPLETED | OUTPATIENT
Start: 2022-01-01 | End: 2022-01-01

## 2022-01-01 RX ORDER — POTASSIUM CHLORIDE 20 MEQ/1
20 TABLET, EXTENDED RELEASE ORAL ONCE
Status: COMPLETED | OUTPATIENT
Start: 2022-01-01 | End: 2022-01-01

## 2022-01-01 RX ORDER — DEXTROSE MONOHYDRATE 25 G/50ML
25 INJECTION, SOLUTION INTRAVENOUS ONCE
Status: DISCONTINUED | OUTPATIENT
Start: 2022-01-01 | End: 2022-01-01

## 2022-01-01 RX ORDER — MAGNESIUM SULFATE IN WATER 40 MG/ML
2000 INJECTION, SOLUTION INTRAVENOUS ONCE
Status: COMPLETED | OUTPATIENT
Start: 2022-01-01 | End: 2022-01-01

## 2022-01-01 RX ORDER — AMLODIPINE BESYLATE 5 MG/1
5 TABLET ORAL DAILY
Status: DISCONTINUED | OUTPATIENT
Start: 2022-01-01 | End: 2022-01-01 | Stop reason: HOSPADM

## 2022-01-01 RX ORDER — SODIUM CHLORIDE 9 MG/ML
INJECTION, SOLUTION INTRAVENOUS CONTINUOUS
Status: DISCONTINUED | OUTPATIENT
Start: 2022-01-01 | End: 2022-01-01 | Stop reason: HOSPADM

## 2022-01-01 RX ORDER — LABETALOL HYDROCHLORIDE 5 MG/ML
INJECTION, SOLUTION INTRAVENOUS
Status: COMPLETED
Start: 2022-01-01 | End: 2022-01-01

## 2022-01-01 RX ORDER — PAROXETINE HYDROCHLORIDE 20 MG/1
20 TABLET, FILM COATED ORAL DAILY
Status: DISCONTINUED | OUTPATIENT
Start: 2022-01-01 | End: 2022-01-01 | Stop reason: HOSPADM

## 2022-01-01 RX ORDER — 3% SODIUM CHLORIDE 3 G/100ML
50 INJECTION, SOLUTION INTRAVENOUS CONTINUOUS
Status: DISPENSED | OUTPATIENT
Start: 2022-01-01 | End: 2022-01-01

## 2022-01-01 RX ORDER — OXYCODONE HYDROCHLORIDE 5 MG/1
2.5 TABLET ORAL EVERY 6 HOURS PRN
Status: DISCONTINUED | OUTPATIENT
Start: 2022-01-01 | End: 2022-01-01

## 2022-01-01 RX ORDER — ASCORBIC ACID 500 MG
500 TABLET ORAL DAILY
COMMUNITY

## 2022-01-01 RX ORDER — ATORVASTATIN CALCIUM 10 MG/1
10 TABLET, FILM COATED ORAL DAILY
COMMUNITY

## 2022-01-01 RX ORDER — IPRATROPIUM BROMIDE AND ALBUTEROL SULFATE 2.5; .5 MG/3ML; MG/3ML
1 SOLUTION RESPIRATORY (INHALATION) AS NEEDED
Status: DISCONTINUED | OUTPATIENT
Start: 2022-01-01 | End: 2022-01-01

## 2022-01-01 RX ORDER — ONDANSETRON 4 MG/1
4 TABLET, ORALLY DISINTEGRATING ORAL EVERY 8 HOURS PRN
Status: DISCONTINUED | OUTPATIENT
Start: 2022-01-01 | End: 2022-01-01 | Stop reason: HOSPADM

## 2022-01-01 RX ORDER — FAMOTIDINE 20 MG/1
20 TABLET, FILM COATED ORAL 2 TIMES DAILY
Status: DISCONTINUED | OUTPATIENT
Start: 2022-01-01 | End: 2022-01-01

## 2022-01-01 RX ORDER — TOPIRAMATE 25 MG/1
TABLET ORAL
Qty: 180 TABLET | Refills: 1 | Status: SHIPPED | OUTPATIENT
Start: 2022-01-01 | End: 2022-01-01 | Stop reason: SDUPTHER

## 2022-01-01 RX ORDER — M-VIT,TX,IRON,MINS/CALC/FOLIC 27MG-0.4MG
1 TABLET ORAL DAILY
COMMUNITY

## 2022-01-01 RX ORDER — PHENOL 1.4 %
1 AEROSOL, SPRAY (ML) MUCOUS MEMBRANE DAILY
COMMUNITY

## 2022-01-01 RX ORDER — POTASSIUM CHLORIDE 29.8 MG/ML
20 INJECTION INTRAVENOUS ONCE
Status: COMPLETED | OUTPATIENT
Start: 2022-01-01 | End: 2022-01-01

## 2022-01-01 RX ORDER — DILTIAZEM HYDROCHLORIDE 180 MG/1
180 CAPSULE, EXTENDED RELEASE ORAL DAILY
COMMUNITY

## 2022-01-01 RX ORDER — 0.9 % SODIUM CHLORIDE 0.9 %
500 INTRAVENOUS SOLUTION INTRAVENOUS ONCE
Status: COMPLETED | OUTPATIENT
Start: 2022-01-01 | End: 2022-01-01

## 2022-01-01 RX ORDER — ALBUTEROL SULFATE 2.5 MG/3ML
2.5 SOLUTION RESPIRATORY (INHALATION) ONCE
Status: COMPLETED | OUTPATIENT
Start: 2022-01-01 | End: 2022-01-01

## 2022-01-01 RX ORDER — ONDANSETRON 2 MG/ML
4 INJECTION INTRAMUSCULAR; INTRAVENOUS ONCE
Status: DISCONTINUED | OUTPATIENT
Start: 2022-01-01 | End: 2022-01-01 | Stop reason: HOSPADM

## 2022-01-01 RX ORDER — LABETALOL HYDROCHLORIDE 5 MG/ML
10 INJECTION, SOLUTION INTRAVENOUS ONCE
Status: COMPLETED | OUTPATIENT
Start: 2022-01-01 | End: 2022-01-01

## 2022-01-01 RX ORDER — LABETALOL HYDROCHLORIDE 5 MG/ML
10 INJECTION, SOLUTION INTRAVENOUS EVERY 4 HOURS PRN
Status: DISCONTINUED | OUTPATIENT
Start: 2022-01-01 | End: 2022-01-01

## 2022-01-01 RX ORDER — ONDANSETRON 2 MG/ML
4 INJECTION INTRAMUSCULAR; INTRAVENOUS ONCE
Status: COMPLETED | OUTPATIENT
Start: 2022-01-01 | End: 2022-01-01

## 2022-01-01 RX ORDER — SENNA AND DOCUSATE SODIUM 50; 8.6 MG/1; MG/1
1 TABLET, FILM COATED ORAL 2 TIMES DAILY
Status: DISCONTINUED | OUTPATIENT
Start: 2022-01-01 | End: 2022-01-01 | Stop reason: HOSPADM

## 2022-01-01 RX ORDER — FENTANYL CITRATE 50 UG/ML
50 INJECTION, SOLUTION INTRAMUSCULAR; INTRAVENOUS
Status: DISCONTINUED | OUTPATIENT
Start: 2022-01-01 | End: 2022-01-01 | Stop reason: HOSPADM

## 2022-01-01 RX ORDER — ACETAMINOPHEN 500 MG
1000 TABLET ORAL ONCE
Status: COMPLETED | OUTPATIENT
Start: 2022-01-01 | End: 2022-01-01

## 2022-01-01 RX ORDER — 0.9 % SODIUM CHLORIDE 0.9 %
1000 INTRAVENOUS SOLUTION INTRAVENOUS ONCE
Status: COMPLETED | OUTPATIENT
Start: 2022-01-01 | End: 2022-01-01

## 2022-01-01 RX ORDER — SODIUM CHLORIDE 9 MG/ML
INJECTION, SOLUTION INTRAVENOUS PRN
Status: DISCONTINUED | OUTPATIENT
Start: 2022-01-01 | End: 2022-01-01 | Stop reason: HOSPADM

## 2022-01-01 RX ORDER — LEVETIRACETAM 500 MG/1
500 TABLET ORAL 2 TIMES DAILY
Status: DISCONTINUED | OUTPATIENT
Start: 2022-01-01 | End: 2022-01-01

## 2022-01-01 RX ORDER — MORPHINE SULFATE 2 MG/ML
2 INJECTION, SOLUTION INTRAMUSCULAR; INTRAVENOUS ONCE
Status: DISCONTINUED | OUTPATIENT
Start: 2022-01-01 | End: 2022-01-01 | Stop reason: HOSPADM

## 2022-01-01 RX ORDER — SODIUM CHLORIDE 0.9 % (FLUSH) 0.9 %
5-40 SYRINGE (ML) INJECTION PRN
Status: DISCONTINUED | OUTPATIENT
Start: 2022-01-01 | End: 2022-01-01 | Stop reason: HOSPADM

## 2022-01-01 RX ORDER — ASPIRIN 81 MG/1
81 TABLET, CHEWABLE ORAL DAILY
COMMUNITY

## 2022-01-01 RX ORDER — OXYBUTYNIN CHLORIDE 5 MG/1
5 TABLET ORAL 3 TIMES DAILY
COMMUNITY

## 2022-01-01 RX ORDER — ONDANSETRON 2 MG/ML
4 INJECTION INTRAMUSCULAR; INTRAVENOUS EVERY 6 HOURS PRN
Status: DISCONTINUED | OUTPATIENT
Start: 2022-01-01 | End: 2022-01-01 | Stop reason: HOSPADM

## 2022-01-01 RX ORDER — PROCHLORPERAZINE EDISYLATE 5 MG/ML
10 INJECTION INTRAMUSCULAR; INTRAVENOUS ONCE
Status: COMPLETED | OUTPATIENT
Start: 2022-01-01 | End: 2022-01-01

## 2022-01-01 RX ORDER — AMOXICILLIN AND CLAVULANATE POTASSIUM 875; 125 MG/1; MG/1
1 TABLET, FILM COATED ORAL 2 TIMES DAILY
Qty: 19 TABLET | Refills: 0 | Status: SHIPPED | OUTPATIENT
Start: 2022-01-01 | End: 2022-01-01

## 2022-01-01 RX ORDER — DEXTROSE MONOHYDRATE 50 MG/ML
INJECTION, SOLUTION INTRAVENOUS
Status: DISCONTINUED
Start: 2022-01-01 | End: 2022-01-01 | Stop reason: HOSPADM

## 2022-01-01 RX ORDER — DEXTROSE MONOHYDRATE 25 G/50ML
12.5 INJECTION, SOLUTION INTRAVENOUS PRN
Status: DISCONTINUED | OUTPATIENT
Start: 2022-01-01 | End: 2022-01-01 | Stop reason: HOSPADM

## 2022-01-01 RX ORDER — METOCLOPRAMIDE 10 MG/1
10 TABLET ORAL EVERY 6 HOURS
Status: DISCONTINUED | OUTPATIENT
Start: 2022-01-01 | End: 2022-01-01 | Stop reason: HOSPADM

## 2022-01-01 RX ORDER — ATORVASTATIN CALCIUM 10 MG/1
10 TABLET, FILM COATED ORAL NIGHTLY
Status: DISCONTINUED | OUTPATIENT
Start: 2022-01-01 | End: 2022-01-01 | Stop reason: HOSPADM

## 2022-01-01 RX ORDER — POLYETHYLENE GLYCOL 3350 17 G/17G
17 POWDER, FOR SOLUTION ORAL DAILY
Status: DISCONTINUED | OUTPATIENT
Start: 2022-01-01 | End: 2022-01-01 | Stop reason: HOSPADM

## 2022-01-01 RX ORDER — FOLIC ACID 1 MG/1
1 TABLET ORAL DAILY
Status: DISCONTINUED | OUTPATIENT
Start: 2022-01-01 | End: 2022-01-01 | Stop reason: HOSPADM

## 2022-01-01 RX ORDER — DOXYCYCLINE HYCLATE 100 MG/1
100 CAPSULE ORAL ONCE
Status: COMPLETED | OUTPATIENT
Start: 2022-01-01 | End: 2022-01-01

## 2022-01-01 RX ORDER — NICOTINE POLACRILEX 4 MG
15 LOZENGE BUCCAL PRN
Status: DISCONTINUED | OUTPATIENT
Start: 2022-01-01 | End: 2022-01-01 | Stop reason: HOSPADM

## 2022-01-01 RX ORDER — LORAZEPAM 2 MG/ML
2 INJECTION INTRAMUSCULAR ONCE
Status: COMPLETED | OUTPATIENT
Start: 2022-01-01 | End: 2022-01-01

## 2022-01-01 RX ORDER — MORPHINE SULFATE 4 MG/ML
4 INJECTION, SOLUTION INTRAMUSCULAR; INTRAVENOUS ONCE
Status: COMPLETED | OUTPATIENT
Start: 2022-01-01 | End: 2022-01-01

## 2022-01-01 RX ORDER — PROPOFOL 10 MG/ML
INJECTION, EMULSION INTRAVENOUS
Status: COMPLETED
Start: 2022-01-01 | End: 2022-01-01

## 2022-01-01 RX ORDER — PROPOFOL 10 MG/ML
5-50 INJECTION, EMULSION INTRAVENOUS CONTINUOUS
Status: DISCONTINUED | OUTPATIENT
Start: 2022-01-01 | End: 2022-01-01 | Stop reason: HOSPADM

## 2022-01-01 RX ORDER — 3% SODIUM CHLORIDE 3 G/100ML
20 INJECTION, SOLUTION INTRAVENOUS CONTINUOUS
Status: DISCONTINUED | OUTPATIENT
Start: 2022-01-01 | End: 2022-01-01 | Stop reason: HOSPADM

## 2022-01-01 RX ORDER — NOREPINEPHRINE BIT/0.9 % NACL 16MG/250ML
1-100 INFUSION BOTTLE (ML) INTRAVENOUS CONTINUOUS
Status: DISCONTINUED | OUTPATIENT
Start: 2022-01-01 | End: 2022-01-01 | Stop reason: HOSPADM

## 2022-01-01 RX ORDER — LANOLIN ALCOHOL/MO/W.PET/CERES
500 CREAM (GRAM) TOPICAL DAILY
Status: DISCONTINUED | OUTPATIENT
Start: 2022-01-01 | End: 2022-01-01 | Stop reason: HOSPADM

## 2022-01-01 RX ORDER — IPRATROPIUM BROMIDE AND ALBUTEROL SULFATE 2.5; .5 MG/3ML; MG/3ML
1 SOLUTION RESPIRATORY (INHALATION) 4 TIMES DAILY
Status: DISCONTINUED | OUTPATIENT
Start: 2022-01-01 | End: 2022-01-01

## 2022-01-01 RX ORDER — DOXYCYCLINE HYCLATE 100 MG
100 TABLET ORAL 2 TIMES DAILY
Qty: 13 TABLET | Refills: 0 | Status: SHIPPED | OUTPATIENT
Start: 2022-01-01 | End: 2022-01-01

## 2022-01-01 RX ORDER — ACETAMINOPHEN 500 MG
1000 TABLET ORAL EVERY 8 HOURS SCHEDULED
Status: DISCONTINUED | OUTPATIENT
Start: 2022-01-01 | End: 2022-01-01 | Stop reason: HOSPADM

## 2022-01-01 RX ORDER — CELECOXIB 200 MG/1
200 CAPSULE ORAL 2 TIMES DAILY
COMMUNITY

## 2022-01-01 RX ORDER — PAROXETINE HYDROCHLORIDE 20 MG/1
20 TABLET, FILM COATED ORAL EVERY MORNING
COMMUNITY

## 2022-01-01 RX ORDER — LEVETIRACETAM 5 MG/ML
500 INJECTION INTRAVASCULAR EVERY 12 HOURS
Status: DISCONTINUED | OUTPATIENT
Start: 2022-01-01 | End: 2022-01-01

## 2022-01-01 RX ORDER — ONDANSETRON 2 MG/ML
INJECTION INTRAMUSCULAR; INTRAVENOUS
Status: COMPLETED
Start: 2022-01-01 | End: 2022-01-01

## 2022-01-01 RX ORDER — FENTANYL CITRATE 50 UG/ML
INJECTION, SOLUTION INTRAMUSCULAR; INTRAVENOUS
Status: COMPLETED
Start: 2022-01-01 | End: 2022-01-01

## 2022-01-01 RX ORDER — TOPIRAMATE 25 MG/1
TABLET ORAL
Qty: 180 TABLET | Refills: 1 | Status: SHIPPED | OUTPATIENT
Start: 2022-01-01

## 2022-01-01 RX ORDER — ONDANSETRON 2 MG/ML
INJECTION INTRAMUSCULAR; INTRAVENOUS
Status: DISCONTINUED
Start: 2022-01-01 | End: 2022-01-01 | Stop reason: HOSPADM

## 2022-01-01 RX ORDER — GABAPENTIN 300 MG/1
300 CAPSULE ORAL EVERY 8 HOURS SCHEDULED
Status: DISCONTINUED | OUTPATIENT
Start: 2022-01-01 | End: 2022-01-01 | Stop reason: HOSPADM

## 2022-01-01 RX ORDER — 3% SODIUM CHLORIDE 3 G/100ML
30 INJECTION, SOLUTION INTRAVENOUS CONTINUOUS
Status: DISCONTINUED | OUTPATIENT
Start: 2022-01-01 | End: 2022-01-01

## 2022-01-01 RX ORDER — DEXTROSE MONOHYDRATE 50 MG/ML
100 INJECTION, SOLUTION INTRAVENOUS PRN
Status: DISCONTINUED | OUTPATIENT
Start: 2022-01-01 | End: 2022-01-01 | Stop reason: HOSPADM

## 2022-01-01 RX ORDER — AMLODIPINE BESYLATE 5 MG/1
5 TABLET ORAL DAILY
Status: DISCONTINUED | OUTPATIENT
Start: 2022-01-01 | End: 2022-01-01

## 2022-01-01 RX ADMIN — LEVETIRACETAM 500 MG: 500 TABLET, FILM COATED ORAL at 12:31

## 2022-01-01 RX ADMIN — LEVETIRACETAM 500 MG: 5 INJECTION INTRAVENOUS at 03:48

## 2022-01-01 RX ADMIN — GABAPENTIN 300 MG: 300 CAPSULE ORAL at 22:58

## 2022-01-01 RX ADMIN — POLYETHYLENE GLYCOL 3350 17 G: 17 POWDER, FOR SOLUTION ORAL at 08:39

## 2022-01-01 RX ADMIN — Medication 50 MCG/HR: at 08:20

## 2022-01-01 RX ADMIN — FOLIC ACID 1 MG: 1 TABLET ORAL at 12:11

## 2022-01-01 RX ADMIN — PROPOFOL 20 MCG/KG/MIN: 10 INJECTION, EMULSION INTRAVENOUS at 02:08

## 2022-01-01 RX ADMIN — METOCLOPRAMIDE 10 MG: 10 TABLET ORAL at 22:44

## 2022-01-01 RX ADMIN — FENTANYL CITRATE 50 MCG: 50 INJECTION, SOLUTION INTRAMUSCULAR; INTRAVENOUS at 20:57

## 2022-01-01 RX ADMIN — SODIUM CHLORIDE, PRESERVATIVE FREE 20 MG: 5 INJECTION INTRAVENOUS at 09:15

## 2022-01-01 RX ADMIN — PIPERACILLIN AND TAZOBACTAM 3375 MG: 3; .375 INJECTION, POWDER, FOR SOLUTION INTRAVENOUS at 09:02

## 2022-01-01 RX ADMIN — SODIUM CHLORIDE 25 ML: 9 INJECTION, SOLUTION INTRAVENOUS at 09:02

## 2022-01-01 RX ADMIN — MAGNESIUM SULFATE 2000 MG: 2 INJECTION INTRAVENOUS at 06:44

## 2022-01-01 RX ADMIN — IPRATROPIUM BROMIDE AND ALBUTEROL SULFATE 1 AMPULE: .5; 2.5 SOLUTION RESPIRATORY (INHALATION) at 02:59

## 2022-01-01 RX ADMIN — Medication 2 MCG/MIN: at 23:49

## 2022-01-01 RX ADMIN — DEXTROSE MONOHYDRATE 5 MG/HR: 50 INJECTION, SOLUTION INTRAVENOUS at 20:53

## 2022-01-01 RX ADMIN — PROPOFOL 20 MCG/KG/MIN: 10 INJECTION, EMULSION INTRAVENOUS at 19:13

## 2022-01-01 RX ADMIN — ONDANSETRON 4 MG: 2 INJECTION INTRAMUSCULAR; INTRAVENOUS at 00:31

## 2022-01-01 RX ADMIN — ATORVASTATIN CALCIUM 10 MG: 10 TABLET, FILM COATED ORAL at 22:45

## 2022-01-01 RX ADMIN — DOCUSATE SODIUM 50 MG AND SENNOSIDES 8.6 MG 1 TABLET: 8.6; 5 TABLET, FILM COATED ORAL at 09:30

## 2022-01-01 RX ADMIN — IPRATROPIUM BROMIDE AND ALBUTEROL SULFATE 1 AMPULE: .5; 2.5 SOLUTION RESPIRATORY (INHALATION) at 02:56

## 2022-01-01 RX ADMIN — IOPAMIDOL 90 ML: 755 INJECTION, SOLUTION INTRAVENOUS at 10:28

## 2022-01-01 RX ADMIN — GABAPENTIN 300 MG: 300 CAPSULE ORAL at 22:44

## 2022-01-01 RX ADMIN — INSULIN LISPRO 3 UNITS: 100 INJECTION, SOLUTION INTRAVENOUS; SUBCUTANEOUS at 16:18

## 2022-01-01 RX ADMIN — MORPHINE SULFATE 4 MG: 4 INJECTION, SOLUTION INTRAMUSCULAR; INTRAVENOUS at 10:53

## 2022-01-01 RX ADMIN — PROCHLORPERAZINE EDISYLATE 10 MG: 5 INJECTION INTRAMUSCULAR; INTRAVENOUS at 03:58

## 2022-01-01 RX ADMIN — SODIUM CHLORIDE 5 MG/HR: 9 INJECTION, SOLUTION INTRAVENOUS at 02:11

## 2022-01-01 RX ADMIN — LEVETIRACETAM 500 MG: 100 SOLUTION ORAL at 09:30

## 2022-01-01 RX ADMIN — Medication 500 MG: at 09:16

## 2022-01-01 RX ADMIN — ACETAMINOPHEN 1000 MG: 500 TABLET ORAL at 22:58

## 2022-01-01 RX ADMIN — FENTANYL CITRATE 50 MCG: 50 INJECTION, SOLUTION INTRAMUSCULAR; INTRAVENOUS at 07:46

## 2022-01-01 RX ADMIN — MORPHINE SULFATE 4 MG: 4 INJECTION INTRAVENOUS at 10:58

## 2022-01-01 RX ADMIN — GABAPENTIN 300 MG: 300 CAPSULE ORAL at 15:16

## 2022-01-01 RX ADMIN — LEVETIRACETAM 500 MG: 500 TABLET, FILM COATED ORAL at 21:30

## 2022-01-01 RX ADMIN — SODIUM CHLORIDE 250 ML: 3 INJECTION, SOLUTION INTRAVENOUS at 05:57

## 2022-01-01 RX ADMIN — SODIUM CHLORIDE 1000 ML: 9 INJECTION, SOLUTION INTRAVENOUS at 06:13

## 2022-01-01 RX ADMIN — MAGNESIUM SULFATE 2000 MG: 2 INJECTION INTRAVENOUS at 12:47

## 2022-01-01 RX ADMIN — SODIUM CHLORIDE, PRESERVATIVE FREE 20 MG: 5 INJECTION INTRAVENOUS at 21:05

## 2022-01-01 RX ADMIN — DOCUSATE SODIUM 50 MG AND SENNOSIDES 8.6 MG 1 TABLET: 8.6; 5 TABLET, FILM COATED ORAL at 09:16

## 2022-01-01 RX ADMIN — SODIUM CHLORIDE 30 ML/HR: 3 INJECTION, SOLUTION INTRAVENOUS at 21:24

## 2022-01-01 RX ADMIN — METOCLOPRAMIDE 10 MG: 10 TABLET ORAL at 15:51

## 2022-01-01 RX ADMIN — POTASSIUM CHLORIDE 20 MEQ: 1500 TABLET, EXTENDED RELEASE ORAL at 13:23

## 2022-01-01 RX ADMIN — SODIUM CHLORIDE, PRESERVATIVE FREE 10 ML: 5 INJECTION INTRAVENOUS at 22:46

## 2022-01-01 RX ADMIN — POLYETHYLENE GLYCOL 3350 17 G: 17 POWDER, FOR SOLUTION ORAL at 09:31

## 2022-01-01 RX ADMIN — Medication 500 MG: at 12:31

## 2022-01-01 RX ADMIN — SODIUM CHLORIDE 25 ML: 9 INJECTION, SOLUTION INTRAVENOUS at 09:11

## 2022-01-01 RX ADMIN — IPRATROPIUM BROMIDE 0.5 MG: 0.5 SOLUTION RESPIRATORY (INHALATION) at 20:47

## 2022-01-01 RX ADMIN — IOPAMIDOL 75 ML: 755 INJECTION, SOLUTION INTRAVENOUS at 11:52

## 2022-01-01 RX ADMIN — ALBUTEROL SULFATE 2.5 MG: 2.5 SOLUTION RESPIRATORY (INHALATION) at 20:47

## 2022-01-01 RX ADMIN — PAROXETINE HYDROCHLORIDE HEMIHYDRATE 20 MG: 20 TABLET, FILM COATED ORAL at 08:32

## 2022-01-01 RX ADMIN — INSULIN LISPRO 3 UNITS: 100 INJECTION, SOLUTION INTRAVENOUS; SUBCUTANEOUS at 06:18

## 2022-01-01 RX ADMIN — Medication 10 MG: at 00:41

## 2022-01-01 RX ADMIN — LORAZEPAM 2 MG: 2 INJECTION INTRAMUSCULAR; INTRAVENOUS at 10:57

## 2022-01-01 RX ADMIN — VASOPRESSIN 0.04 UNITS/MIN: 20 INJECTION INTRAVENOUS at 06:18

## 2022-01-01 RX ADMIN — MORPHINE SULFATE 4 MG: 4 INJECTION INTRAVENOUS at 10:53

## 2022-01-01 RX ADMIN — SODIUM CHLORIDE 250 ML: 3 INJECTION, SOLUTION INTRAVENOUS at 20:58

## 2022-01-01 RX ADMIN — SODIUM CHLORIDE 1000 ML: 9 INJECTION, SOLUTION INTRAVENOUS at 06:54

## 2022-01-01 RX ADMIN — LEVETIRACETAM 500 MG: 500 TABLET, FILM COATED ORAL at 09:16

## 2022-01-01 RX ADMIN — POTASSIUM CHLORIDE 20 MEQ: 29.8 INJECTION, SOLUTION INTRAVENOUS at 08:54

## 2022-01-01 RX ADMIN — LABETALOL HYDROCHLORIDE 10 MG: 5 INJECTION, SOLUTION INTRAVENOUS at 00:41

## 2022-01-01 RX ADMIN — VANCOMYCIN HYDROCHLORIDE 1000 MG: 1 INJECTION, POWDER, LYOPHILIZED, FOR SOLUTION INTRAVENOUS at 09:25

## 2022-01-01 RX ADMIN — FENTANYL CITRATE 50 MCG: 50 INJECTION, SOLUTION INTRAMUSCULAR; INTRAVENOUS at 00:33

## 2022-01-01 RX ADMIN — GABAPENTIN 300 MG: 300 CAPSULE ORAL at 15:51

## 2022-01-01 RX ADMIN — METOCLOPRAMIDE 10 MG: 10 TABLET ORAL at 12:47

## 2022-01-01 RX ADMIN — Medication 10 MG: at 15:48

## 2022-01-01 RX ADMIN — SODIUM CHLORIDE 500 ML: 9 INJECTION, SOLUTION INTRAVENOUS at 09:52

## 2022-01-01 RX ADMIN — SODIUM CHLORIDE 30 ML/HR: 3 INJECTION, SOLUTION INTRAVENOUS at 09:35

## 2022-01-01 RX ADMIN — SODIUM CHLORIDE 30 ML/HR: 3 INJECTION, SOLUTION INTRAVENOUS at 16:21

## 2022-01-01 RX ADMIN — ACETAMINOPHEN 1000 MG: 500 TABLET ORAL at 06:10

## 2022-01-01 RX ADMIN — DEXTROSE MONOHYDRATE 5 MG/HR: 50 INJECTION, SOLUTION INTRAVENOUS at 08:58

## 2022-01-01 RX ADMIN — SODIUM BICARBONATE 100 MEQ: 84 INJECTION, SOLUTION INTRAVENOUS at 08:00

## 2022-01-01 RX ADMIN — IPRATROPIUM BROMIDE AND ALBUTEROL SULFATE 1 AMPULE: .5; 2.5 SOLUTION RESPIRATORY (INHALATION) at 11:58

## 2022-01-01 RX ADMIN — IPRATROPIUM BROMIDE AND ALBUTEROL SULFATE 1 AMPULE: .5; 2.5 SOLUTION RESPIRATORY (INHALATION) at 07:46

## 2022-01-01 RX ADMIN — ATORVASTATIN CALCIUM 10 MG: 10 TABLET, FILM COATED ORAL at 21:05

## 2022-01-01 RX ADMIN — METOCLOPRAMIDE 10 MG: 10 TABLET ORAL at 22:58

## 2022-01-01 RX ADMIN — MAGNESIUM SULFATE 2000 MG: 2 INJECTION INTRAVENOUS at 09:12

## 2022-01-01 RX ADMIN — DEXTROSE MONOHYDRATE 5 MG/HR: 50 INJECTION, SOLUTION INTRAVENOUS at 03:58

## 2022-01-01 RX ADMIN — ACETAMINOPHEN 1000 MG: 500 TABLET ORAL at 15:16

## 2022-01-01 RX ADMIN — ACETAMINOPHEN 1000 MG: 500 TABLET ORAL at 15:50

## 2022-01-01 RX ADMIN — LORAZEPAM 2 MG: 2 INJECTION INTRAMUSCULAR at 10:52

## 2022-01-01 RX ADMIN — SODIUM CHLORIDE 50 ML/HR: 3 INJECTION, SOLUTION INTRAVENOUS at 14:40

## 2022-01-01 RX ADMIN — ACETAMINOPHEN 1000 MG: 500 TABLET ORAL at 22:44

## 2022-01-01 RX ADMIN — AMPICILLIN SODIUM AND SULBACTAM SODIUM 3000 MG: 2; 1 INJECTION, POWDER, FOR SOLUTION INTRAMUSCULAR; INTRAVENOUS at 10:09

## 2022-01-01 RX ADMIN — FAMOTIDINE 20 MG: 20 TABLET, FILM COATED ORAL at 12:31

## 2022-01-01 RX ADMIN — FENTANYL CITRATE 100 MCG: 50 INJECTION, SOLUTION INTRAMUSCULAR; INTRAVENOUS at 07:05

## 2022-01-01 RX ADMIN — Medication 500 MG: at 09:30

## 2022-01-01 RX ADMIN — THIAMINE HYDROCHLORIDE 500 MG: 100 INJECTION, SOLUTION INTRAMUSCULAR; INTRAVENOUS at 06:08

## 2022-01-01 RX ADMIN — SODIUM CHLORIDE, PRESERVATIVE FREE 10 ML: 5 INJECTION INTRAVENOUS at 09:43

## 2022-01-01 RX ADMIN — DEXTROSE MONOHYDRATE 2.5 MG/HR: 50 INJECTION, SOLUTION INTRAVENOUS at 13:27

## 2022-01-01 RX ADMIN — SODIUM CHLORIDE: 9 INJECTION, SOLUTION INTRAVENOUS at 02:18

## 2022-01-01 RX ADMIN — INSULIN LISPRO 3 UNITS: 100 INJECTION, SOLUTION INTRAVENOUS; SUBCUTANEOUS at 12:43

## 2022-01-01 RX ADMIN — LORAZEPAM 2 MG: 2 INJECTION INTRAMUSCULAR; INTRAVENOUS at 10:52

## 2022-01-01 RX ADMIN — SODIUM CHLORIDE 50 ML/HR: 3 INJECTION, SOLUTION INTRAVENOUS at 04:02

## 2022-01-01 RX ADMIN — ONDANSETRON 4 MG: 2 INJECTION INTRAMUSCULAR; INTRAVENOUS at 03:29

## 2022-01-01 RX ADMIN — METOCLOPRAMIDE 10 MG: 10 TABLET ORAL at 04:21

## 2022-01-01 RX ADMIN — DOXYCYCLINE HYCLATE 100 MG: 100 CAPSULE ORAL at 12:53

## 2022-01-01 RX ADMIN — NOREPINEPHRINE BITARTRATE 16 MG: 1 INJECTION, SOLUTION, CONCENTRATE INTRAVENOUS at 10:53

## 2022-01-01 RX ADMIN — DOCUSATE SODIUM 50 MG AND SENNOSIDES 8.6 MG 1 TABLET: 8.6; 5 TABLET, FILM COATED ORAL at 21:05

## 2022-01-01 RX ADMIN — PROPOFOL 10 MCG/KG/MIN: 10 INJECTION, EMULSION INTRAVENOUS at 07:00

## 2022-01-01 RX ADMIN — GABAPENTIN 300 MG: 300 CAPSULE ORAL at 06:10

## 2022-01-01 RX ADMIN — DEXTROSE MONOHYDRATE 15 MG/HR: 50 INJECTION, SOLUTION INTRAVENOUS at 13:58

## 2022-01-01 RX ADMIN — DEXTROSE MONOHYDRATE 12.5 MG/HR: 50 INJECTION, SOLUTION INTRAVENOUS at 06:45

## 2022-01-01 RX ADMIN — SODIUM CHLORIDE, PRESERVATIVE FREE 20 MG: 5 INJECTION INTRAVENOUS at 00:12

## 2022-01-01 RX ADMIN — IPRATROPIUM BROMIDE AND ALBUTEROL SULFATE 1 AMPULE: .5; 2.5 SOLUTION RESPIRATORY (INHALATION) at 15:47

## 2022-01-01 RX ADMIN — INSULIN LISPRO 3 UNITS: 100 INJECTION, SOLUTION INTRAVENOUS; SUBCUTANEOUS at 00:12

## 2022-01-01 RX ADMIN — SODIUM CHLORIDE, PRESERVATIVE FREE 10 ML: 5 INJECTION INTRAVENOUS at 09:15

## 2022-01-01 RX ADMIN — DOCUSATE SODIUM 50 MG AND SENNOSIDES 8.6 MG 1 TABLET: 8.6; 5 TABLET, FILM COATED ORAL at 22:45

## 2022-01-01 RX ADMIN — ACETAMINOPHEN 1000 MG: 500 TABLET ORAL at 12:06

## 2022-01-01 RX ADMIN — POLYETHYLENE GLYCOL 3350 17 G: 17 POWDER, FOR SOLUTION ORAL at 09:44

## 2022-01-01 RX ADMIN — IPRATROPIUM BROMIDE AND ALBUTEROL SULFATE 1 AMPULE: .5; 2.5 SOLUTION RESPIRATORY (INHALATION) at 16:31

## 2022-01-01 RX ADMIN — IPRATROPIUM BROMIDE AND ALBUTEROL SULFATE 1 AMPULE: .5; 2.5 SOLUTION RESPIRATORY (INHALATION) at 20:00

## 2022-01-01 RX ADMIN — DEXTROSE MONOHYDRATE 25 G: 70 INJECTION, SOLUTION INTRAVENOUS at 09:20

## 2022-01-01 RX ADMIN — SODIUM CHLORIDE 500 ML: 9 INJECTION, SOLUTION INTRAVENOUS at 23:48

## 2022-01-01 RX ADMIN — Medication 75 MCG/HR: at 01:02

## 2022-01-01 RX ADMIN — LEVETIRACETAM 500 MG: 500 TABLET, FILM COATED ORAL at 22:45

## 2022-01-01 RX ADMIN — SODIUM CHLORIDE, PRESERVATIVE FREE 20 MG: 5 INJECTION INTRAVENOUS at 09:30

## 2022-01-01 RX ADMIN — SODIUM CHLORIDE 40 ML/HR: 3 INJECTION, SOLUTION INTRAVENOUS at 01:06

## 2022-01-01 RX ADMIN — SODIUM CHLORIDE 25 ML: 9 INJECTION, SOLUTION INTRAVENOUS at 12:45

## 2022-01-01 RX ADMIN — METOCLOPRAMIDE 10 MG: 10 TABLET ORAL at 18:50

## 2022-01-01 ASSESSMENT — PAIN DESCRIPTION - PAIN TYPE
TYPE: ACUTE PAIN
TYPE: ACUTE PAIN

## 2022-01-01 ASSESSMENT — PULMONARY FUNCTION TESTS
PIF_VALUE: 18
PIF_VALUE: 22
PIF_VALUE: 18
PIF_VALUE: 14
PIF_VALUE: 18
PIF_VALUE: 14
PIF_VALUE: 18
PIF_VALUE: 17
PIF_VALUE: 18
PIF_VALUE: 26
PIF_VALUE: 29
PIF_VALUE: 30
PIF_VALUE: 21
PIF_VALUE: 18
PIF_VALUE: 22
PIF_VALUE: 22
PIF_VALUE: 21
PIF_VALUE: 39
PIF_VALUE: 22
PIF_VALUE: 20
PIF_VALUE: 20
PIF_VALUE: 33
PIF_VALUE: 18
PIF_VALUE: 19
PIF_VALUE: 18
PIF_VALUE: 15
PIF_VALUE: 14
PIF_VALUE: 17
PIF_VALUE: 18
PIF_VALUE: 18
PIF_VALUE: 28
PIF_VALUE: 17
PIF_VALUE: 19
PIF_VALUE: 19
PIF_VALUE: 20
PIF_VALUE: 21
PIF_VALUE: 18
PIF_VALUE: 21
PIF_VALUE: 18
PIF_VALUE: 22
PIF_VALUE: 18
PIF_VALUE: 23
PIF_VALUE: 19
PIF_VALUE: 18
PIF_VALUE: 18
PIF_VALUE: 20
PIF_VALUE: 18
PIF_VALUE: 30

## 2022-01-01 ASSESSMENT — PAIN DESCRIPTION - LOCATION
LOCATION: FACE
LOCATION: BACK
LOCATION: BACK

## 2022-01-01 ASSESSMENT — ENCOUNTER SYMPTOMS
SHORTNESS OF BREATH: 0
VOMITING: 1
APNEA: 0
CHEST TIGHTNESS: 0
NAUSEA: 0
VOMITING: 0
EYE PAIN: 0
ABDOMINAL DISTENTION: 0
ABDOMINAL PAIN: 0
TROUBLE SWALLOWING: 0
NAUSEA: 0
COUGH: 0
EYES NEGATIVE: 1
SORE THROAT: 0
NAUSEA: 1
EYE ITCHING: 0
CONSTIPATION: 0
SINUS PRESSURE: 0
DIARRHEA: 0
SINUS PAIN: 0
SHORTNESS OF BREATH: 0

## 2022-01-01 ASSESSMENT — PAIN SCALES - GENERAL
PAINLEVEL_OUTOF10: 3
PAINLEVEL_OUTOF10: 10
PAINLEVEL_OUTOF10: 0
PAINLEVEL_OUTOF10: 10
PAINLEVEL_OUTOF10: 9
PAINLEVEL_OUTOF10: 0
PAINLEVEL_OUTOF10: 7
PAINLEVEL_OUTOF10: 6
PAINLEVEL_OUTOF10: 0

## 2022-01-01 ASSESSMENT — PAIN DESCRIPTION - DESCRIPTORS: DESCRIPTORS: PRESSURE

## 2022-01-13 NOTE — PROGRESS NOTES
NEUROLOGY Follow up    Patient Name:  Bethany Adames  :   1950  Clinic Visit Date: 2022    I saw Ms. Bethany Adames  in the neurology clinic today for migraine headaches. Doing well. Headaches remain improved on Topamax 50 mg/day. No side effects. She continues to have mild daily headaches for which she takes Tylenol as needed. Also gives a history of snoring at night, and feels tired during the daytime. Blood pressure is elevated today. She had been on losartan in the past.  It has been discontinued. No other new neurological symptoms. REVIEW OF SYSTEMS    Constitutional Weight changes: absent, change in appetite: absent Fatigue: present; Fevers : absent, Any recent hospitalizations:  absent   HEENT Ears: normal,  Visual disturbance: absent   Respiratory Shortness of breath: absent, choking:  absent, Cough: absent, Snoring : present   Cardiovascular Chest pain: present, Leg swelling :absent, palpitations : present, fainting : absent   GI Constipation: absent, Diarrhea: absent, Swallowing change: absent    Urinary frequency: absent, Urinary urgency: absent, Urinary incontinence: absent   Musculoskeletal Neck pain: present, Back pain: absent, Stiffness: absent, Muscle pain: absent, Joint pain: present, restless leg : absent   Dermatological Hair loss: absent, Skin changes: absent   Neurological Confusion: absent, Trouble concentrating: absent, Seizures: absent;  Memory loss: absent, balance problem: absent, Dizziness: present, vertigo: absent, Weakness: present, Numbness absent, Tremor: absent, Spasm: absent, involuntary movement: absent, Speech difficulty: absent, Headache: present, Light sensitivity: absent   Psychiatric Anxiety: present, Depression  absent, drug abuse: absent, Hallucination: absent, mood disorder: absent, Suicidal ideations absent   Hematologic Abnormal bleeding: absent, Anemia: absent, Lymph gland changes: absent Clotting disorder: absent     Past Medical History: Diagnosis Date    Abnormal patient-activated cardiac event monitor 2017    sinus tachycardia with PSVT lasting 36 sec @ 165 bpm.    Cataract     H/O echocardiogram 08/15/2017    EF 60%. Mildly increased LV wall thickness normal LV cavity size. No definite specific wall motion abnormalities were identified. No significant valvular abnormalities Mild diastolic dysfunction is seen.  Hypertension     Primary cancer of bone marrow [facility])        Past Surgical History:   Procedure Laterality Date    CARDIAC CATHETERIZATION Left 2017    left radial/[facility]/Dr Garcia    HYSTERECTOMY  1996    UPPER GASTROINTESTINAL ENDOSCOPY N/A 2018    EGD BIOPSY performed by Mich Sheth MD at [address] [place] [address] Marital status:      Spouse name: Not on file    Number of children: Not on file    Years of education: Not on file    Highest education level: Not on file   Occupational History    Not on file   Tobacco Use    Smoking status: Former Smoker     Packs/day: 1.00     Years: 6.00     Pack years: 6.00     Types: Cigarettes     Quit date: 2021     Years since quittin.5    Smokeless tobacco: Never Used   Vaping Use    Vaping Use: Never used   Substance and Sexual Activity    Alcohol use: Yes     Comment: 3 or 4 glasses with dinner    Drug use: No    Sexual activity: Not on file   Other Topics Concern    Not on file   Social History Narrative    Not on file     Social Determinants of Health     Financial Resource Strain:     Difficulty of Paying Living Expenses: Not on file   Food Insecurity:     Worried About [address] in the Last Year: Not on file    Cynthia of Food in the Last Year: Not on file   Transportation Needs:     Lack of Transportation (Medical): Not on file    Lack of Transportation (Non-Medical):  Not on file   Physical Activity:     Days of Exercise per Week: Not on file    Minutes of Exercise per Session: Not on file   Stress:     Feeling of Stress : Not on file   Social Connections:     Frequency of Communication with Friends and Family: Not on file    Frequency of Social Gatherings with Friends and Family: Not on file    Attends [employer] Services: Not on file    Active Member of [address] [place] [address] or Organizations: Not on file    Attends Club or Organization Meetings: Not on file    Marital Status: Not on file   Intimate Partner Violence:     Fear of Current or Ex-Partner: Not on file    Emotionally Abused: Not on file    Physically Abused: Not on file    Sexually Abused: Not on file   Housing Stability:     Unable to Pay for Housing in the Last Year: Not on file    Number of [place] in the Last Year: Not on file    Unstable Housing in the Last Year: Not on file       Family History   Problem Relation Age of Onset    Lupus Mother     Cancer Father         leukemia    Brain Cancer Sister        Current Outpatient Medications   Medication Sig Dispense Refill    topiramate (TOPAMAX) 25 MG tablet TAKE 2 TABLETS EVERY NIGHT 180 tablet 1    b complex vitamins capsule Take 1 capsule by mouth daily      aspirin 81 MG EC tablet Take 81 mg by mouth daily      atorvastatin (LIPITOR) 10 MG tablet Take 1 tablet by mouth daily 90 tablet 3    celecoxib (CELEBREX) 200 MG capsule Take 200 mg by mouth 2 times daily      tiZANidine (ZANAFLEX) 4 MG tablet Take 4 mg by mouth 3 times daily       vitamin B-12 (CYANOCOBALAMIN) 1000 MCG tablet Take 1,000 mcg by mouth daily      valACYclovir (VALTREX) 500 MG tablet Take 1,000 mg by mouth 2 times daily as needed       Cholecalciferol (VITAMIN D) 2000 units CAPS capsule Take by mouth daily       oxybutynin (DITROPAN) 5 MG tablet Take 5 mg by mouth 3 times daily      calcium carbonate (OSCAL) 500 MG TABS tablet Take 1,200 mg by mouth daily      Multiple Vitamins-Minerals (THERAPEUTIC MULTIVITAMIN-MINERALS) tablet Take 1 tablet by mouth daily      PARoxetine (PAXIL) 20 MG tablet Take 20 mg by mouth every morning       No current facility-administered medications for this visit. DATA:  Lab Results   Component Value Date    WBC 4.3 10/25/2021    HGB 11.0 (L) 10/25/2021     10/25/2021    CHOL 94 06/28/2021    TRIG 46 06/28/2021    HDL 63 06/28/2021    ALT 11 10/25/2021    AST 15 10/25/2021     10/25/2021    K 4.2 10/25/2021     10/25/2021    CREATININE 0.66 10/25/2021    BUN 18 10/25/2021    CO2 22 10/25/2021    TSH 1.88 06/28/2021    LABA1C 5.0 08/16/2017       BP (!) 157/63 (Site: Right Upper Arm, Position: Sitting, Cuff Size: Medium Adult)   Pulse 72   Temp 97.6 °F (36.4 °C) (Temporal)   Resp 18   Ht 5' 6\" (1.676 m)   Wt 140 lb 12.8 oz (63.9 kg)   BMI 22.73 kg/m²     NEUROLOGICAL EXAMINATION:     MENTAL STATUS:.  Normal    CRANIAL NERVES: II -XII are normal    MOTOR EXAMINATION: Muscle tone is normal in all the limbs. Strength is 5/5 in both upper and lower limbs. No abnormal limb movements. SENSORY EXAMINATION: Normal.     STRETCH REFLEXES: Symmetric in both the upper and lower limbs. GAIT:.  Normal    IMPRESSION:    1. Migraine headaches. Doing well on Topamax. 2.  Mild daily headaches and daytime fatigue which could be related to underlying sleep apnea  3. Hypertension    PLAN:    1. Continues Topamax [25 mg] 2 tablets at bedtime. 2.  Recommended to monitor blood pressure at home on a regular basis. 3.  We will consider a sleep study  if she continues to experience daytime fatigue and tiredness    NOTE: This neurology evaluation is part of outpatient coverage at [place]  1-2 days per week. Patients requiring frequent evaluations or uncomfortable with potential 3-4 day turnaround on questions or calls  may be better served by a neurologist in the area full time. [facility]. Esa is available for outpatient visits and procedures including EMG/NCS.   Non-[facility] neurologists also practice in [facility] (Dr. Marco Hancock) and [facility] (Jane Kruse).        Fuentes Alvares MD   1/13/2022  2:36 PM

## 2022-01-13 NOTE — PATIENT INSTRUCTIONS
SURVEY:    You may be receiving a survey from [employer] regarding your visit today. Please complete the survey to enable us to provide the highest quality of care to you and your family. If you cannot score us a very good on any question, please call the office to discuss how we could have made your experience a very good one. Thank you. II

## 2022-01-21 NOTE — ED PROVIDER NOTES
cancer of bone marrow [facility])          SURGICAL HISTORY       Past Surgical History:   Procedure Laterality Date    CARDIAC CATHETERIZATION Left 08/16/2017    left radial/[facility] [place]/ [address] [place] [address]    UPPER GASTROINTESTINAL ENDOSCOPY N/A 7/21/2018    EGD BIOPSY performed by Sue Padilla MD at [address] [place] [address]       Discharge Medication List as of 1/21/2022 12:40 PM      CONTINUE these medications which have NOT CHANGED    Details   topiramate (TOPAMAX) 25 MG tablet TAKE 2 TABLETS EVERY NIGHT, Disp-180 tablet, R-1Normal      b complex vitamins capsule Take 1 capsule by mouth dailyHistorical Med      aspirin 81 MG EC tablet Take 81 mg by mouth dailyHistorical Med      atorvastatin (LIPITOR) 10 MG tablet Take 1 tablet by mouth daily, Disp-90 tablet, R-3Normal      celecoxib (CELEBREX) 200 MG capsule Take 200 mg by mouth 2 times dailyHistorical Med      tiZANidine (ZANAFLEX) 4 MG tablet Take 4 mg by mouth 3 times daily Historical Med      vitamin B-12 (CYANOCOBALAMIN) 1000 MCG tablet Take 1,000 mcg by mouth dailyHistorical Med      valACYclovir (VALTREX) 500 MG tablet Take 1,000 mg by mouth 2 times daily as needed Historical Med      Cholecalciferol (VITAMIN D) 2000 units CAPS capsule Take by mouth daily Historical Med      oxybutynin (DITROPAN) 5 MG tablet Take 5 mg by mouth 3 times dailyHistorical Med      calcium carbonate (OSCAL) 500 MG TABS tablet Take 1,200 mg by mouth dailyHistorical Med      Multiple Vitamins-Minerals (THERAPEUTIC MULTIVITAMIN-MINERALS) tablet Take 1 tablet by mouth dailyHistorical Med      PARoxetine (PAXIL) 20 MG tablet Take 20 mg by mouth every morningHistorical Med             ALLERGIES     Coricidin hbp cold-flu [chlorpheniramine-acetaminophen]    FAMILY HISTORY       Family History   Problem Relation Age of Onset    Lupus Mother     Cancer Father         leukemia    Brain Cancer Sister           SOCIAL HISTORY       Social History Socioeconomic History    Marital status:      Spouse name: None    Number of children: None    Years of education: None    Highest education level: None   Occupational History    None   Tobacco Use    Smoking status: Current Every Day Smoker     Packs/day: 0.50     Years: 6.00     Pack years: 3.00     Types: Cigarettes     Last attempt to quit: 2021     Years since quittin.5    Smokeless tobacco: Never Used   Vaping Use    Vaping Use: Never used   Substance and Sexual Activity    Alcohol use: Yes     Comment: 3 or 4 glasses with dinner    Drug use: No    Sexual activity: None   Other Topics Concern    None   Social History Narrative    None     Social Determinants of Health     Financial Resource Strain:     Difficulty of Paying Living Expenses: Not on file   Food Insecurity:     Worried About Running Out of Food in the Last Year: Not on file    Cynthia of Food in the Last Year: Not on file   Transportation Needs:     Lack of Transportation (Medical): Not on file    Lack of Transportation (Non-Medical):  Not on file   Physical Activity:     Days of Exercise per Week: Not on file    Minutes of Exercise per Session: Not on file   Stress:     Feeling of Stress : Not on file   Social Connections:     Frequency of Communication with Friends and Family: Not on file    Frequency of Social Gatherings with Friends and Family: Not on file    Attends Denominational Services: Not on file    Active Member of [address] [place] [address] or Organizations: Not on file    Attends Club or Organization Meetings: Not on file    Marital Status: Not on file   Intimate Partner Violence:     Fear of Current or Ex-Partner: Not on file    Emotionally Abused: Not on file    Physically Abused: Not on file    Sexually Abused: Not on file   Housing Stability:     Unable to Pay for Housing in the Last Year: Not on file    Number of [place] in the Last Year: Not on file    Unstable Housing in the Last Year: Not on file       PHYSICAL EXAM    (up to 7 for level 4, 8 or more for level 5)     ED Triage Vitals [01/21/22 0931]   BP Temp Temp Source Pulse Resp SpO2 Height Weight   (!) 206/71 99.4 °F (37.4 °C) Tympanic 72 18 97 % -- 140 lb (63.5 kg)       Physical Exam  Vitals and nursing note reviewed. Constitutional:       Comments: Awake and alert. No acute distress. Maintaining airway without difficulty. HENT:      Head: No raccoon eyes or Brito's sign. Jaw: Trismus (mild/mod) present. Mouth/Throat:      Comments: Oromucosa pink and moist.  No tongue swelling. No sublingual induration. There is swelling, induration and fluctuance of the mucosal aspect of the lower lip on the left side, extending to the chin. Mild trismus noted. Eyes:      Comments: Anicteric. No discharge. No ptosis or proptosis. No periorbital swelling. Neck:      Comments: Erythema. No induration or fluctuance. No mass. Normal range of motion. Cardiovascular:      Rate and Rhythm: Normal rate. Pulmonary:      Effort: Pulmonary effort is normal.      Breath sounds: No stridor. Skin:     General: Skin is warm and dry. Coloration: Skin is not jaundiced or pale. DIAGNOSTIC RESULTS       Interpretation per the Radiologist below, if available at the time of this note:    CT SOFT TISSUE NECK W CONTRAST   Final Result   1. Abnormal subcutaneous soft tissue stranding along the lower left face and   submandibular soft tissues consistent with a cellulitis. 2. No abscess or soft tissue mass identified.                ED BEDSIDE ULTRASOUND:   Performed by ED Physician - none    LABS:  Labs Reviewed   CBC WITH AUTO DIFFERENTIAL - Abnormal; Notable for the following components:       Result Value    RBC 3.68 (*)     .0 (*)     Seg Neutrophils 69 (*)     Lymphocytes 23 (*)     All other components within normal limits   BASIC METABOLIC PANEL W/ REFLEX TO MG FOR LOW K       All other labs were within normal range or not returned as of this dictation. EMERGENCY DEPARTMENT COURSE and DIFFERENTIAL DIAGNOSIS/MDM:   Vitals:    Vitals:    01/21/22 0931 01/21/22 1109   BP: (!) 206/71 (!) 220/71   Pulse: 72    Resp: 18    Temp: 99.4 °F (37.4 °C)    TempSrc: Tympanic    SpO2: 97%    Weight: 140 lb (63.5 kg)      Patient presented to the emergency department for evaluation of possible abscess to the face. Did have some mild trismus on exam, though this appears to likely be effort related secondary to discomfort. There is no physical obstruction to mouth opening and patient was able to adequately open for the exam.  She does have some erythema stranding down the center and left side of the neck with no induration or fluctuance there. Minimal induration and fluctuance noted to the chin as well as to the mucosal aspect of the left lower lip. The remainder of the oral exam is unremarkable. Clinically, there is no suggestion of Eliazar's angina. However, given what she describes as relatively rapid change in the appearance of erythema today and worsening swelling to the chin the plan was for CT imaging of the neck, soft tissue, for evaluation of potential deep space infection. This demonstrates findings consistent with cellulitis but no drainable fluid collection or evident deep space infection. While pending CT patient did receive empiric Unasyn. On reevaluation following CT there is no indication for admission for IV antibiotics. The patient remained stable with no suggestion of airway compromise. Plan at this time is for discharge home on dual antibiotic therapy with both Augmentin and doxycycline to ensure that potential oral margot as well as MRSA is covered. Patient was given very strict return precautions. She was noted to be hypertensive in the ED. When changing the blood pressure cuff out for the correct size, this was noted to be markedly improved with SBP of 170 compared to the initial readings of . However, she is asymptomatic with respect to her hypertension. Given this, my plan is for discharge home with outpatient reevaluation of both her cellulitis and hypertension. There is no indication for treatment of asymptomatic hypertension at this time. Patient is amenable to this evaluation and plan and understands necessary return precautions. FINAL IMPRESSION      1.  Facial cellulitis          DISPOSITION/PLAN   DISPOSITION Decision To Discharge 01/21/2022 12:37:47 PM      PATIENT REFERRED TO:  Michael Byrnes MD  [address] [place] [address],[facility]  [phone]    Schedule an appointment as soon as possible for a visit in 2 days        DISCHARGE MEDICATIONS:  Discharge Medication List as of 1/21/2022 12:40 PM      START taking these medications    Details   amoxicillin-clavulanate (AUGMENTIN) 875-125 MG per tablet Take 1 tablet by mouth 2 times daily for 10 days, Disp-19 tablet, R-0Print      doxycycline hyclate (VIBRA-TABS) 100 MG tablet Take 1 tablet by mouth 2 times daily for 7 days, Disp-13 tablet, R-0Print             (Please note that portions of this note were completed with a voice recognition program.  Efforts were made to edit the dictations but occasionally words are mis-transcribed.)    Al Hernandez MD (electronically signed)  Attending Emergency Physician           Al Hernandez MD  01/21/22 [phone] [id number]

## 2022-04-13 PROBLEM — S06.331A: Status: ACTIVE | Noted: 2022-01-01

## 2022-04-13 PROBLEM — S06.6X1A TRAUMATIC SUBARACHNOID HEMORRHAGE WITH LOSS OF CONSCIOUSNESS OF 30 MINUTES OR LESS (HCC): Status: ACTIVE | Noted: 2022-01-01
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
## 2022-04-13 PROBLEM — S06.5XAA SUBDURAL HEMATOMA: Status: ACTIVE | Noted: 2022-01-01

## 2022-04-13 NOTE — ED NOTES
Pt to Trauma A as transfer from [facility] via [facility] for SDH and SAH after fall. Pt states she had a few glasses of wine at home and had witnessed fall off chair by her . Pt has + LOC and is amnestic to events. Per LF4 pt has RACE score of 0. Pt complains of headache, dizziness, and NV. No other injuries noted by [facility] or [facility]. Pt has hx of  Carotid surgery in 2021 and has swollen lymph nodes that she has an apt with an Oncologist for. She is alert and oriented. Trauma and ED team at bedside.      Dearl MICHAEL Olivas  04/13/22 9888

## 2022-04-13 NOTE — H&P
TRAUMA HISTORY AND PHYSICAL EXAMINATION    PATIENT NAME: Trauma Yoko Cisneros  YOB: 1878  MEDICAL RECORD NO. [id number]   DATE: 4/13/2022  PRIMARY CARE PHYSICIAN: No primary care provider on file. PATIENT EVALUATED AT THE REQUEST OF DR. Cordova    ACTIVATION   []Trauma Alert     [x] Trauma Priority     []Trauma Consult. IMPRESSION:     Patient Active Problem List   Diagnosis    Subdural hematoma [facility])       MEDICAL DECISION MAKING AND PLAN:       73year old female s/p fall backwards with SDH, SAH and bifrontal hemorrhagic contusions. Patient had donated blood earlier in the day and went home and had 3 glasses of wine. Plan:  CV/ Heme  F/u EKG  F/u labs  ASA 81 daily, hold for now  Hypertensive in trauma bay 180/60s, Labetalol given  Home atorvastatin started  Pulm:   Desaturated in CT scanner, nasal cannula   Renal:   Greer in place  Monitor UOP  Normal saline 125  GI:  NPO  Nausea, Zofran administered in trauma bay  Neuro:  CT head: Subdural hematoma, subarachnoid hemorrhage, bifrontal hemorrhagic contusions  Repeat CTH at 03:00  Neurosurgery consulted  Systolic blood pressure goal <160  No MAP pushes  Further recommendations to follow  Endo:  No history diabetes, continue to monitor blood glucose levels  ID:  No current indications for antibiotics    Dis[facility]: [facility]mit to TICU[facility]    [x] Neurosurgery     [] Orthopedic Surgery    [] Cardiothoracic     [] Facial Trauma    [] Plastic Surgery (Burn)    [] Pediatric Surgery     [] Internal Medicine    [] Pulmonary Medicine    [] Other:       HISTORY:     Chief Complaint:  My head hurts    INJURY SUMMARY  Subarachnoid hemorrhage  Subdural hematoma  Bifrontal hemorrhagic contusions  Completion images pending    If intracranial hemorrhage is present, is it a BIG 1 category: [] YES  [x]NO    GENERAL DATA  Age 82 y.o.  female   Patient information was obtained from patient and EMS personnel.   History/Exam limitations: none.  Patient presented to the Emergency Department by ambulance where the patient received see Ambulance Run Sheet prior to arrival.  Injury Date: 4/13/2022   Approximate Injury Time: Early evening        Transport mode:   []Ambulance      [x] Helicopter     []Car       [] Other  Referring Hospital: [address] [place] [address], (e.g., home, farm, industry, street)  Specific Details of Location (e.g., bedroom, kitchen, garage): Home  Type of Residence (if occurred in home setting) (e.g., apartment, mobile home, single family home): unknown    MECHANISM OF INJURY    [x] Fall    [x]From Standing     []From Height  Ft     []Down Stairs ___steps    HISTORY:     Binta Galarza is a 82 y.o. female that presented to the Emergency Department as a transfer from [place] [address]. Patient was drinking wine this evening and fell backwards, hit her head and lost consciousness. Has been witnessed the fall. She had been vomiting at some point prior to the fall however details about this are unclear. Patient is unable to tell us her medical history however we do know from [place] she does not take blood thinners and has an INR of 1.4. CT head at [place] demonstrated subdural hematoma, as well as small subarachnoid hemorrhage as well as hemorrhagic contusions in the anterior bifrontal lobes. On arrival patient had emesis and was complaining of head pain. She says she does not remember falling or anything that happened afterwards. Patient had donated blood earlier on in the day and then went home and drank some wine.     Loss of Consciousness []No   [x]Yes Duration(min)       [] Unknown     Total Fluids Given Prior To Arrival  mL    MEDICATIONS:   []  None     []  Information not available due to exam limitations documented above  ASA 81, Lipitor, Oxybutinin, Tizanidin     ALLERGIES:   []  None    []   Information not available due to exam limitations documented above     PAST MEDICAL HISTORY: []  None   []   Information not Rate and Rhythm: Normal rate and regular rhythm. Pulses: Normal pulses. Pulmonary:      Effort: Pulmonary effort is normal. No respiratory distress. Breath sounds: No wheezing. Abdominal:      General: Abdomen is flat. There is no distension. Palpations: Abdomen is soft. Tenderness: There is no abdominal tenderness. Musculoskeletal:         General: No swelling or tenderness. Normal range of motion. Cervical back: Normal range of motion and neck supple. No rigidity or tenderness. Skin:     General: Skin is warm and dry. Capillary Refill: Capillary refill takes less than 2 seconds. Neurological:      General: No focal deficit present. Mental Status: She is alert and oriented to person, place, and time. Psychiatric:         Mood and Affect: Mood normal.         Thought Content: Thought content normal.         Judgment: Judgment normal.          FOCUSED ABDOMINAL SONOGRAM FOR TRAUMA (FAST): A good  quality examination was performed by Dr. Liliana Gonzales and representative images were obtained. [x] No free fluid in the abdomen   [] Free fluid in RUQ   [] Free fluid in LUQ  [] Free fluid in Pelvis  [] Pericardial fluid  [] Other:        RADIOLOGY  CT LUMBAR SPINE TRAUMA RECONSTRUCTION    (Results Pending)   CT THORACIC SPINE TRAUMA RECONSTRUCTION    (Results Pending)   CT CHEST ABDOMEN PELVIS WO CONTRAST    (Results Pending)   CT HEAD WO CONTRAST    (Results Pending)       LABS    Labs Reviewed   COVID-19, RAPID       Charlette Argueta DO  4/13/22, 12:35 AM  Attestation signed by Aarti Garrett MD    I personally evaluated the patient and directed the medical decision making with Resident/MASTER after the physical/radiologic exam and laboratory values were reviewed and confirmed. Seen on arrival to ED. Scans reviewed. NS consulted, concerned over potential to blossom. Will admit to ICU.      Aarti Garrett MD

## 2022-04-13 NOTE — PLAN OF CARE
Problem: OXYGENATION/RESPIRATORY FUNCTION  Goal: Patient will maintain patent airway  4/13/2022 0903 by Kaylin Vasquez RCP  Outcome: Ongoing  Goal: Patient will achieve/maintain normal respiratory rate/effort  Description: Respiratory rate and effort will be within normal limits for the patient  4/13/2022 0903 by Kaylin Vasquez RCP  Outcome: Ongoing

## 2022-04-13 NOTE — SIGNIFICANT EVENT
Insert Arterial Line    Date/Time: 4/13/2022 3:24 PM  Performed by: Harper Godoy MD  Authorized by: Harper Godoy MD   Consent: Verbal consent obtained. Risks and benefits: risks, benefits and alternatives were discussed  Consent given by: spouse  Procedure consent: procedure consent matches procedure scheduled  Relevant documents: relevant documents present and verified  Test results: test results available and properly labeled  Site marked: the operative site was marked  Imaging studies: imaging studies available  Required items: required blood products, implants, devices, and special equipment available  Patient identity confirmed: arm band  Time out: Immediately prior to procedure a \"time out\" was called to verify the correct patient, procedure, equipment, support staff and site/side marked as required. Preparation: Patient was prepped and draped in the usual sterile fashion. Indications: hemodynamic monitoring  Location: right femoral  Anesthesia: local infiltration    Anesthesia:  Local Anesthetic: lidocaine 1% without epinephrine  Anesthetic total: 5 mL    Sedation:  Patient sedated: yes  Sedatives: fentanyl and propofol  Vitals: Vital signs were monitored during sedation. Needle gauge: 20  Seldinger technique used: US guided.   Number of attempts: 2  Post-procedure: line sutured and dressing applied  Patient tolerance: patient tolerated the procedure well with no immediate complications  Comments: Dr Kimberli Fowler present during all the procedure, Dr. Zamzam Thrasher available immediately

## 2022-04-13 NOTE — PROCEDURES
PROCEDURE NOTE - CENTRAL VENOUS LINE PLACEMENT    PATIENT NAME: Crescencio Hernandez  MEDICAL RECORD NO. [id number]  DATE: 4/13/2022  SURGEON: Dr. Taylor Cast: No primary care provider on file. PREOPERATIVE DIAGNOSIS:  Need for IV access  POSTOPERATIVE DIAGNOSIS:  Same  PROCEDURE PERFORMED:  Right Internal Jugular Vein Central Line Insertion  SURGEON: Mirian Castorena PGY-3  ANESTHESIA:  Local utilizing 1% lidocaine  ESTIMATED BLOOD LOSS:  Less than 25 ml  COMPLICATIONS:  None immediately appreciated. OPERATIVE NOTE PREPARED BY: Lily Hodges DO     DISCUSSION:  Crescencio Hernandez is a 73y.o.-year-old female who requires additional IV access and central pressure monitoring. The history and physical examination were reviewed and confirmed. The diagnoses, proposed procedure, risks, possible complications, benefits and alternatives were discussed with the patient or family. She was given the opportunity to ask questions, and once answered, verbal consent was obtained. The patient was then prepared for the procedure. PROCEDURE:  A timeout was initiated by the bedside nurse and was confirmed by those present. The patient was placed in a supine position. The skin overlying the Right Internal Jugular Vein was prepped with chlorhexadine and draped in sterile fashion. The skin was infiltrated with local anesthetic. Through the anesthetized region, the introducer needle was inserted into the internal jugular vein returning dark red non pulsatile blood. A guidewire was placed through the center of the needle with no resistance. A small incision made in the skin with a #11 scalpel blade. The dilator was inserted into the skin and vein over guidewire using Seldinger technique. The dilator was then removed and the catheter was placed in the vein over the guidewire using Seldinger technique. The guidewire was then removed and all ports aspirated and flushed appropriately.  The catheter then secured using silk suture and a temporary sterile dressing was applied. No immediate complication was evident. All sponge, instrument and needle counts were correct at the completion of the procedure. Postprocedural chest x-ray showed good position of the catheter with no evidence of hemothorax or pneumothorax. The patient tolerated the procedure well with no immediate complication evident. Yolanda Mandel,   4/13/22, 5:53 AM  Good blood return, tolerated well. Present.

## 2022-04-13 NOTE — PROGRESS NOTES
[facility]  Speech Language Pathology    Date: 4/13/2022  Patient Name: Felicia Martinez  YOB: 1950   AGE: 73 y.o. MRN: [id number]        Patient Not Available for Speech Therapy     Due to:  [] Testing  [] Hemodialysis  [] Cancelled by RN  [] Surgery   [x] Intubation/Sedation/Pain Medication  [] Medical instability  [] Other:    Next scheduled treatment:     As medically appropriate    Completed by: Balta Jenkins  Clinician    Cosigned By: Carleen Pallas. S.CCC/SLP

## 2022-04-13 NOTE — SIGNIFICANT EVENT
Intubation    Date/Time: 4/13/2022 7:04 AM  Performed by: Emelyn Bolton MD  Authorized by: Elias Wade MD   Consent: The procedure was performed in an emergent situation. Verbal consent obtained. Consent given by: spouse (Discused previous procedure with Kg Jacobo, spouse, explainning risks , beneficts snd possible outcomes. patient not able to concent due to [address] )  Relevant documents: relevant documents present and verified  Test results: test results available and properly labeled  Patient identity confirmed: arm band  Time out: Immediately prior to procedure a \"time out\" was called to verify the correct patient, procedure, equipment, support staff and site/side marked as required. Indications: airway protection  Intubation method: video-assisted  Patient status: sedated  Preoxygenation: BVM  Pretreatment meds: etomidate. Paralytic: rocuronium  Laryngoscope size: Carver 3  Tube size: 7.5 mm  Tube type: cuffed  Number of attempts: 1  Cricoid pressure: no  Cords visualized: yes  Post-procedure assessment: chest rise and CO2 detector  Breath sounds: equal and absent over the epigastrium  Cuff inflated: yes  ETT to lip: 25 cm  ETT to teeth: 23 cm  Tube secured with: ETT matias  Patient tolerance: patient tolerated the procedure well with no immediate complications  Comments: Dr. Tao Poag present during all the procedure. Dr. Sarah Garcia available immediately.

## 2022-04-13 NOTE — CARE COORDINATION
Case Management Initial Discharge Plan  Benito Cordova             Met with:patient's Harish spivey, to discuss discharge plans. Pt intubated  Information verified: address, contacts, phone number, , insurance Yes  Insurance Provider: Tash Calderon    Emergency Contact/Next of Kin name & Divya Rodrigues - [facility] [phone]  Who are involved in patient's support system? Rick, dtr, son    PCP: Dl Michel MD  Date of last visit: 3 weeks ago      Discharge Planning    Living Arrangements:  Pt resides with rick    Home has 1 stories  3 stairs to climb to get into front door, 0 stairs to climb to reach second floor  Location of bedroom/bathroom in home  - main floor    Patient able to perform ADL's:Independent    Current Services (outpatient & in home) none  DME equipment: none  DME provider:     Is patient receiving oral anticoagulation therapy? No    If indicated:   Physician managing anticoagulation treatment:   Where does patient obtain lab work for ATC treatment? Does patient have any issues/concerns obtaining medications? No  If yes, what are patient's concerns? Is there a preferred Pharmacy after hours or on weekends? Yes - uses [facility] and mail order thru insurance    If yes, which pharmacy? Potential Assistance Needed:  SNF/LTAC    Patient agreeable to home care: Yes  Freedom of choice provided:  yes    Prior SNF/Rehab Placement and Facility: no  Agreeable to SNF/Rehab: Yes  [place] of choice provided: yes     Evaluation: no    Expected Discharge date:  22    Patient expects to be discharged to: snf      If home: is the family and/or caregiver wiling & able to provide support at home? Probable placement  Who will be providing this support?      Follow Up Appointment: Best Day/ Time: Tuesday PM    Transportation provider: rick zelaya  Transportation arrangements needed for discharge: Yes, amb for probable SNF    Readmission Risk              Risk of Unplanned Readmission:  11             Does patient have a readmission risk score greater than 14?: No  If yes, follow-up appointment must be made within 7 days of discharge.      Goals of Care:       Educated husb on transitional options, provided freedom of choice and are agreeable with plan      Discharge Plan: Pt intubated/sedated   Will see how pt progresses to determine needs, likely SNF in [place]        Electronically signed by Glory Torres on 4/13/22 at 3:01 PM EDT

## 2022-04-13 NOTE — CARE COORDINATION
Transitional Care Planning    Attempted to contact , Meryle Jo.     No answer  Will attempt later or [facility]

## 2022-04-13 NOTE — ED PROVIDER NOTES
[facility] [facility] [facility]     Emergency Department     Faculty Attestation        I performed a history and physical examination of the patient and discussed management with the resident. I reviewed the residents note and agree with the documented findings and plan of care. Any areas of disagreement are noted on the chart. I was personally present for the key portions of any procedures. I have documented in the chart those procedures where I was not present during the key portions. I have reviewed the emergency nurses triage note. I agree with the chief complaint, past medical history, past surgical history, allergies, medications, social and family history as documented unless otherwise noted below. For Physician Assistant/ Nurse Practitioner cases/documentation I have personally evaluated this patient and have completed at least one if not all key elements of the E/M (history, physical exam, and MDM). Additional findings are as noted. Please refer to Trauma Flow Sheet as well. Pertinent Comments: The patient is a trauma with transfer from [facility] with mechaincal fall and Bi SDH and small SAH. CT C-spine was read as negative by radiology patient taken in c-collar prior to arrival.    A Trauma Priority was called for the patient, and the Trauma team was in the room. Patient arrived with vomiting occurring in better controlled with Zofran. Neurosurgery consulted as well      CRITICAL CARE: There was a high probability of clinically significant/life threatening deterioration in this patient's condition which required my urgent intervention. Total critical care time was 30 minutes. This excludes any time for separately reportable procedures. CT scan of the brain was ordered after minor head trauma of less than 24 hours with a GCS of 15 due to:  Ordered by another Physician/Service.     (Please note that portions of this note were completed with a voice recognition program. Efforts were made to edit the dictations but occasionally words are mis-transcribed.  Whenever words are used in this note in any gender, they shall be construed as though they were used in the gender appropriate to the circumstances; and whenever words are used in this note in the singular or plural form, they shall be construed as though they were used in the form appropriate to the circumstances.)    MD Gael Chirinos  Attending Emergency Medicine Physician     Kartik Mullins MD  04/13/22 3314       Kartik Mullins MD  04/13/22 0111

## 2022-04-13 NOTE — PROGRESS NOTES
Patient intubated at 0700 with no apparent complications. Good color change and bilateral breath sounds heard. Tube secured.

## 2022-04-13 NOTE — ED NOTES
Blood drawn by Edita Quintana RN and sent to lab by Brian Lloyd from lab     Sharlyne Duverney, RN  04/13/22 0030

## 2022-04-13 NOTE — ED NOTES
[facility]  04/12/22 8376

## 2022-04-13 NOTE — SIGNIFICANT EVENT
[facility] [place] [address]  Flight Physician       Pt Name: Mcdonald Peabody  MRN: [id number]  [place] 1950  Date of evaluation: 04/13/22      REASON FOR FLIGHT      Patient was transported from [facility] to [facility] due to need for trauma, neurology, and neurosurgery evaluation. HISTORY OF PRESENT ILLNESS     Jaime Hendrix is a 68-year-old female with past medical history significant for recurrent falls and unsteady gait with history of abnormal tilt table test, paroxysmal supraventricular tachycardia in 2017, and head injury with hypertensive urgency in 2021 who originally presented to [facility] shortly after a fall from standing height outdoors. Patient history supplemented by  at bedside because patient does not recall events of the fall. Apparently patient had had approximately 3 glasses of wine tonight and at some point she did have an episode of emesis prior to her fall. She then suffered a fall from standing height striking the back of her head with questionable LOC and subsequent complaints of severe headache with injury noted to the posterior head. Negative basilar skull findings on assessment but CT head was significant for multiple intracranial hemorrhages including 2 subdural hematomas and a subarachnoid hemorrhage. CT C-spine negative and C-spine cleared prior to [facility] arrival.  Patient takes ASA 81 daily and did take it today.     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, cataracts, hysterectomy in 1996, cardiac catheterization in 2017, EGD with biopsy in 2018    Meds: ASA 81, atorvastatin 10 mg daily, B complex vitamins capsule daily, calcium carbonate 500 mg daily, celecoxib 200 mg twice daily, cholecalciferol 2000 units daily, multivitamin daily, oxybutynin 5 mg 3 times daily, paroxetine 20 mg every morning, Zanaflex 4 mg 3 times daily, Topamax 50 mg every night, valacyclovir 1000 mg twice daily as needed, vitamin B12 1000 mcg daily    Allergies: Corocidin    [facility] Arrival Time: 2212    VS: Please see trends and vitals noted elsewhere in chart    Physical Exam:  Gen: Intoxicated appearing female with minor slurring of words but no focal deficits, cooperative with exam, ANO x3  HEENT: Normocephalic with dried blood in nare and mouth, dry mucous membranes, supple neck with no external signs of trauma, but posterior head pain with hematoma noted on occiput  Neuro: GCS 14 for mild confusion, slurring of words. Pupils are 1 mm larger on the left compared to right and reactive bilaterally. NIH stroke scale performed by provider and detailed below. CV: Patient initially demonstrates tachycardic rate and regular rhythm with occasional PVCs  Pulm: Intermittent SPO2 desaturation to 90% on room air spontaneously resolves, no respiratory distress noted  Abd: Nondistended, soft  MSK: No gross evidence of trauma, 5 out of 5 muscle strength in bilateral lower extremities with intact sensation    INITIAL NIH STROKE SCALE    Time Performed:  9463    Administer stroke scale items in the order listed. Record performance in each category after each subscale exam. Do not go back and change scores. Follow directions provided for each exam technique. Scores should reflect what the patient does, not what the clinician thinks the patient can do. The clinician should record answers while administering the exam and work quickly. Except where indicated, the patient should not be coached (i.e., repeated requests to patient to make a special effort). 1a.  Level of consciousness:  0 - alert; keenly responsive  1b. Level of consciousness questions:  0 - answers both questions correctly  1c. Level of consciousness questions:  0 - performs both tasks correctly  2. Best Gaze:  0 - normal  3. Visual:  0 - no visual loss  4.     Facial Palsy:  0 - normal symmetric movement  5a. Motor left arm:  0 - no drift, limb holds 90 (or 45) degrees for full 10 seconds  5b. Motor right arm:  0 - no drift, limb holds 90 (or 45) degrees for full 10 seconds  6a. Motor left le - no drift; leg holds 30 degree position for full 5 seconds  6b. Motor right le - no drift; leg holds 30 degree position for full 5 seconds  7. Limb Ataxia:  0 - absent  8. Sensory:  0 - normal; no sensory loss  9. Best Language:  0 - no aphasia, normal  10. Dysarthria:  0 - normal  11. Extinction and Inattention:  0 - no abnormality    TOTAL:  0        [facility] Departure Time: 0001    En Route:  Patient was transitioned over to [facility] monitor and bedside report provided by [place] nurse. 4 mg Zofran provided prior to departure from emergency department. Patient secured in helicopter and twelve-lead performed which demonstrated evidence of incomplete right bundle branch block with sinus tachycardia. Patient alert, cooperative, denied any chest or back pain and continued to endorse headache that patient had initially come in with. 50 mcg fentanyl administered for analgesia. Repeat neurologic exam noted left pupil 3 mm right pupil 2 mm and reactive. Due to a mechanical failure [facility] was unable to transport patient with initial aircraft. Patient was removed from helicopter and brought back into [place] emergency department where [address] [place] [address]  team continue to assume care of patient. Repeat twelve-lead performed which demonstrated the same incomplete intraventricular block, which was not demonstrated on prior EKG on 2021. EKG findings were transmitted to [address] [place] [address] at 2253 and EKG was discussed between provider and attending physician Dr. Shabana Dailey. Ultimately decided that no STEMI was evident. 1 hour after initial fentanyl dose patient continued to endorse headache so another 50 mcg IV fentanyl was administered.   Due to continued sinus tachycardia and clinical findings have dry mucous membranes, a 500 cc normal saline bolus was administered with spontaneous resolution of arrhythmia; heart rate abruptly dropped from 105 bpm to about 70 bpm with normal sinus rhythm noted on monitor. Patient transported into the trauma bay and all questions answered prior to departure. Noted that patient began vomiting shortly after arrival in the emergency department. MDM:  Patient is a 46-year-old female with history significant for positive tilt table test, alcohol use, and prior head injuries who originally presented with a fall with positive EtOH. Multiple acute intracranial hemorrhages were noted and patient was transported emergently to center with neurosurgical intervention and trauma surgery intervention capabilities. Patient was initially called out as a stroke however no hard findings of stroke were evident on assessment. This neurologic evaluation was limited by the fact that patient was somewhat intoxicated and complaining of headache. A mechanical failure precipitated a delay in this patient's transfer but patient remained stable during repeat neurologic assessments by provider. Differential as to the etiology of the fall includes but is not limited to paroxysmal arrhythmia, dehydration, electrolyte derangement, infection, intracranial hemorrhage prior to the fall, or substance intoxication. Patient was transported in stable condition to [address] [place] [address] and care was transferred at bedside to trauma surgery team.    [facility]     Medications at sending facility: None    Medications in flight: 4 mg Zofran, 2 doses 50 mcg fentanyl    PROCEDURES/Ultrasound:  None    CRITICAL CARE:  There was a high probability of this patient's clinical status decompensating without my direct intervention. Total critical care time was more than 60 minutes.       Kevin Jiang MD  [facility] Physician     (Please note that portions of this note were completed with a voicerecognition program.  Efforts were made to edit the dictations but occasionally words are mis-transcribed.)

## 2022-04-13 NOTE — CONSULTS
[facility] [place] [facility]   [address] [facility]    Neurology Consult Note            Date:   4/13/2022  Patient name:  Robert Kohler  Date of admission:  4/13/2022 12:10 AM  MRN:   [id number]  Account:  [de-identified]  YOB: 1950  PCP:    Ellena Eisenmenger, MD  Room:   [address] [place] [address]  Code Status:    Full Code    Chief Complaint:     Chief Complaint   Patient presents with    Fall       History Obtained From:     electronic medical record    History of Present Illness:     71F unclear PMHx but apparently not on AC at home came in from [place] after fall s/p donating blood and drinking wine earlier in the day. INR at [place] 1.4 and scans showing (per GS note) \" subdural hematoma, as well as small subarachnoid hemorrhage as well as hemorrhagic contusions in the anterior bifrontal lobes. \"    Repeat CT noncon here showed: Interval increase in size of extra-axial hematomas over the left parietal   convexity and layering across the falx.       Redemonstration of band-like hypodensity within the cerebellum, on the   right-hand side, consistent with acute infarct or nonhemorrhagic contusion. -new 4mm midline shift per this CT apparently w/ GCS change 14 to 11 per other provider notes for which she was intubated    CTA head/neck-negative      Neurology consulted for \"ischemia on CT. \"    Current status:  -vss-hypertensive 651A systolic  -intubated/sedated on vent  -bg 187  -inr 1.1        Past Medical History:     No past medical history on file. Past Surgical History:     No past surgical history on file. Medications Prior to Admission:     Prior to Admission medications    Not on File        Allergies:     Patient has no allergy information on record. Social History:     Tobacco:    has no history on file for tobacco use. Alcohol:      has no history on file for alcohol use. Drug Use:  has no history on file for drug use.     Family History:     No family history on file.    Review of Systems:     -unable to obtain 2/2 intubated/sedated    Physical Exam:   BP (!) 156/44   Pulse 54   Temp 98.2 °F (36.8 °C) (Bladder)   Resp 21   Ht 5' 6\" (1.676 m)   Wt 138 lb 13.5 oz (63 kg)   SpO2 99%   BMI 22.41 kg/m²   Temp (24hrs), Av.4 °F (36.3 °C), Min:96.8 °F (36 °C), Max:98.2 °F (36.8 °C)    Recent Labs     22  0750   POCGLU 187*       Intake/Output Summary (Last 24 hours) at 2022 1258  Last data filed at 2022 0800  Gross per 24 hour   Intake 1103.62 ml   Output 1530 ml   Net -426.38 ml         Neurologic Exam                GENERAL Intubated/sedated   MENTAL STATUS: Unable to obtain-intubated/sedated   CRANIAL NERVES: Not assessed   MOTOR FUNCTION: Unable to obtain    GCS=11  Opens eyes to voice-3  Grunts-2  Localizes to pain  Move limbs to command-6     Normal bulk, normal tone and no involuntary movements, no tremor   SENSORY FUNCTION:  Normal touch, normal pinprick, normal vibration, normal proprioception   CEREBELLAR FUNCTION: Not assessed    REFLEX FUNCTION:  Symmetric in upper and lower extremities, no Babinski sign   STATION and GAIT  Not assessed       Investigations:      Laboratory Testing:  Recent Results (from the past 24 hour(s))   Trauma Panel    Collection Time: 22 12:30 AM   Result Value Ref Range    Ethanol 23 (H) <10 mg/dL    Ethanol percent 0.023 (H) <0.010 %    Blood Bank Specimen BILL FOR SERVICES PERFORMED     BUN 13 8 - 23 mg/dL    WBC 12.8 (H) 3.5 - 11.3 k/uL    RBC 3.62 (L) 3.95 - 5.11 m/uL    Hemoglobin 12.6 11.9 - 15.1 g/dL    Hematocrit 38.3 36.3 - 47.1 %    .8 (H) 82.6 - 102.9 fL    MCH 34.8 (H) 25.2 - 33.5 pg    MCHC 32.9 28.4 - 34.8 g/dL    RDW 12.9 11.8 - 14.4 %    Platelets 977 463 - 081 k/uL    MPV 9.4 8.1 - 13.5 fL    NRBC Automated 0.0 0.0 per 100 WBC    CREATININE 0.63 0.50 - 0.90 mg/dL    Glucose 118 (H) 70 - 99 mg/dL    hCG Qual NEGATIVE NEGATIVE    Sodium 143 135 - 144 mmol/L    Potassium 4.1 3.7 - 5.3 mmol/L Chloride 105 98 - 107 mmol/L    CO2 23 20 - 31 mmol/L    Anion Gap 15 9 - 17 mmol/L    Protime 11.5 9.1 - 12.3 sec    INR 1.1     PTT 29.3 20.5 - 30.5 sec    pH, Ulices 7.297 (L) 7.320 - 7.420    pCO2, Ulices 46.9 39 - 55    pO2, Ulices 40.9 30 - 50    HCO3, Venous 22.2 (L) 24 - 30 mmol/L    Negative Base Excess, Ulices 3.9 (H) 0.0 - 2.0 mmol/L    O2 Sat, Ulices 71.6 60.0 - 85.0 %    Carboxyhemoglobin 4.7 0 - 5 %    Pt Temp 37.0     FIO2 UNKNOWN    Troponin    Collection Time: 04/13/22 12:30 AM   Result Value Ref Range    Troponin, High Sensitivity 11 0 - 14 ng/L   Vitamin B12 & Folate    Collection Time: 04/13/22 12:30 AM   Result Value Ref Range    Vitamin B-12 619 232 - 1245 pg/mL    Folate >20.0 >4.8 ng/mL   COVID-19, Rapid    Collection Time: 04/13/22 12:34 AM    Specimen: Nasopharyngeal Swab   Result Value Ref Range    Specimen Description . NASOPHARYNGEAL SWAB     SARS-CoV-2, Rapid Not Detected Not Detected   TYPE AND SCREEN    Collection Time: 04/13/22 12:34 AM   Result Value Ref Range    Expiration Date 04/16/2022,2359     Arm Band Number BE [id number]     ABO/Rh B NEGATIVE     Antibody Screen NEGATIVE    EKG 12 Lead    Collection Time: 04/13/22 12:55 AM   Result Value Ref Range    Ventricular Rate 58 BPM    Atrial Rate 58 BPM    P-R Interval 234 ms    QRS Duration 102 ms    Q-T Interval 486 ms    QTc Calculation (Bazett) 477 ms    P Axis 72 degrees    R Axis 5 degrees    T Axis 48 degrees   Urine Drug Screen    Collection Time: 04/13/22  1:22 AM   Result Value Ref Range    Amphetamine Screen, Ur NEGATIVE NEGATIVE    Barbiturate Screen, Ur NEGATIVE NEGATIVE    Benzodiazepine Screen, Urine NEGATIVE NEGATIVE    Cocaine Metabolite, Urine NEGATIVE NEGATIVE    Methadone Screen, Urine NEGATIVE NEGATIVE    Opiates, Urine NEGATIVE NEGATIVE    Phencyclidine, Urine NEGATIVE NEGATIVE    Cannabinoid Scrn, Ur NEGATIVE NEGATIVE    Oxycodone Screen, Ur NEGATIVE NEGATIVE    Test Information       Assay provides medical screening only.   The absence of expected drug(s) and/or metabolite(s) may indicate diluted or adulterated urine, limitations of testing or timing of collection. Urinalysis    Collection Time: 04/13/22  1:22 AM   Result Value Ref Range    Color, UA Yellow Yellow    Turbidity UA Clear Clear    Glucose, Ur NEGATIVE NEGATIVE    Bilirubin Urine NEGATIVE NEGATIVE    Ketones, Urine NEGATIVE NEGATIVE    Specific Gravity, UA 1.013 1.005 - 1.030    Urine Hgb NEGATIVE NEGATIVE    pH, UA 6.5 5.0 - 8.0    Protein, UA TRACE (A) NEGATIVE    Urobilinogen, Urine Normal Normal    Nitrite, Urine NEGATIVE NEGATIVE    Leukocyte Esterase, Urine NEGATIVE NEGATIVE   Microscopic Urinalysis    Collection Time: 04/13/22  1:22 AM   Result Value Ref Range    -          WBC, UA None 0 - 5 /HPF    RBC, UA 2 TO 5 0 - 4 /HPF    Casts UA  0 - 8 /LPF     0 TO 2 HYALINE Reference range defined for non-centrifuged specimen. Epithelial Cells UA 0 TO 2 0 - 5 /HPF   MRSA DNA Probe, Nasal    Collection Time: 04/13/22  3:28 AM    Specimen: Nasal   Result Value Ref Range    Specimen Description . NASAL SWAB     MRSA, DNA, Nasal NEGATIVE NEGATIVE   Basic Metabolic Panel w/ Reflex to MG    Collection Time: 04/13/22  3:46 AM   Result Value Ref Range    Glucose 161 (H) 70 - 99 mg/dL    BUN 12 8 - 23 mg/dL    CREATININE 0.60 0.50 - 0.90 mg/dL    Calcium 8.6 8.6 - 10.4 mg/dL    Sodium 136 135 - 144 mmol/L    Potassium 3.8 3.7 - 5.3 mmol/L    Chloride 100 98 - 107 mmol/L    CO2 23 20 - 31 mmol/L    Anion Gap 13 9 - 17 mmol/L    GFR Non-African American >60 >60 mL/min    GFR African American >60 >60 mL/min    GFR Comment         CBC with Auto Differential    Collection Time: 04/13/22  3:46 AM   Result Value Ref Range    WBC 9.8 3.5 - 11.3 k/uL    RBC 3.44 (L) 3.95 - 5.11 m/uL    Hemoglobin 11.7 (L) 11.9 - 15.1 g/dL    Hematocrit 35.9 (L) 36.3 - 47.1 %    .4 (H) 82.6 - 102.9 fL    MCH 34.0 (H) 25.2 - 33.5 pg    MCHC 32.6 28.4 - 34.8 g/dL    RDW 12.9 11.8 - 14.4 %    Platelets 166 138 - 453 k/uL    MPV 9.7 8.1 - 13.5 fL    NRBC Automated 0.0 0.0 per 100 WBC    Seg Neutrophils 82 (H) 36 - 65 %    Lymphocytes 12 (L) 24 - 43 %    Monocytes 5 3 - 12 %    Eosinophils % 0 (L) 1 - 4 %    Basophils 0 0 - 2 %    Immature Granulocytes 1 (H) 0 %    Segs Absolute 8.06 1.50 - 8.10 k/uL    Absolute Lymph # 1.15 1.10 - 3.70 k/uL    Absolute Mono # 0.50 0.10 - 1.20 k/uL    Absolute Eos # 0.03 0.00 - 0.44 k/uL    Basophils Absolute <0.03 0.00 - 0.20 k/uL    Absolute Immature Granulocyte 0.05 0.00 - 0.30 k/uL    RBC Morphology MACROCYTOSIS PRESENT    Magnesium    Collection Time: 04/13/22  3:46 AM   Result Value Ref Range    Magnesium 1.8 1.6 - 2.6 mg/dL   TEG, Rapid Citrated    Collection Time: 04/13/22  5:25 AM   Result Value Ref Range    ACT .0 (H) 86 - 118 sec    Reaction Time Rapid TEG 0.9 0.0 - 1.0 min    Kinetics Rapid TEG 4.0 (H) 1.0 - 2.0 min    Angle, Rapid TEG 50.1 (L) 64 - 80 deg    MA(Max Clot) Rapid TEG 49.6 (L) 52 - 71 mm    LY30(Lysis) TEG 1.3 0 - 8 %    EPL-TEG 1.3 0.0 - 15.0 %    TEG Comment       ACT is the only FDA approved component of the Rapid TEG.     Heparin Therapy UNKNOWN    Fibrinogen    Collection Time: 04/13/22  5:26 AM   Result Value Ref Range    Fibrinogen 173 140 - 420 mg/dL   Arterial Blood Gas, POC    Collection Time: 04/13/22  7:50 AM   Result Value Ref Range    POC pH 7.325 (L) 7.350 - 7.450    POC pCO2 51.9 (H) 35.0 - 48.0 mm Hg    POC PO2 324.2 (H) 83.0 - 108.0 mm Hg    POC HCO3 27.1 21.0 - 28.0 mmol/L    Positive Base Excess, Art 0 0.0 - 3.0    POC O2  (H) 94.0 - 98.0 %    O2 Device/Flow/% Adult Ventilator     Sample Site Right Brachial Artery     FIO2 100.0     Pt Temp 37.2    ELECTROLYTES PLUS    Collection Time: 04/13/22  7:50 AM   Result Value Ref Range    POC Sodium 143 138 - 146 mmol/L    POC Potassium 3.9 3.5 - 4.5 mmol/L    POC Chloride 109 (H) 98 - 107 mmol/L    POC TCO2 28 22 - 30 mmol/L    Anion Gap 7 7 - 16 mmol/L   Hemoglobin and hematocrit, blood    Collection Time: 04/13/22  7:50 AM   Result Value Ref Range    POC Hemoglobin 11.7 (L) 12.0 - 16.0 g/dL    POC Hematocrit 35 (L) 36 - 46 %   Creatinine W/GFR Point of Care    Collection Time: 04/13/22  7:50 AM   Result Value Ref Range    POC Creatinine 0.79 0.51 - 1.19 mg/dL    GFR Comment >60 >60 mL/min    GFR Non-African American >60 >60 mL/min    GFR Comment         CALCIUM, IONIC (POC)    Collection Time: 04/13/22  7:50 AM   Result Value Ref Range    POC Ionized Calcium 1.23 1.15 - 1.33 mmol/L   POCT urea (BUN)    Collection Time: 04/13/22  7:50 AM   Result Value Ref Range    POC BUN 10 8 - 26 mg/dL   Lactic Acid, POC    Collection Time: 04/13/22  7:50 AM   Result Value Ref Range    POC Lactic Acid 1.15 0.56 - 1.39 mmol/L   POCT Glucose    Collection Time: 04/13/22  7:50 AM   Result Value Ref Range    POC Glucose 187 (H) 74 - 100 mg/dL         Assessment :      Primary Problem  Fall from standing    Active Hospital Problems    Diagnosis Date Noted    Subdural hematoma ([facility] [address] [F78.3Y0O] 04/13/2022    Fall from standing [W19. XXXA] 04/13/2022    Contusion of frontal lobe with loss of consciousness of 30 minutes or less, initial encounter [facility]) [S06.331A] 04/13/2022    Traumatic subarachnoid hemorrhage with loss of consciousness of 30 minutes or less ([facility] [address] [S06.6X1A] 04/13/2022       71F recent fall w/ ICH/SDH s/p fall demonstrated at OSH w/ initial INR 1.4 now 1.1 ct noncon repeated here showing possible new ischemic infarct for which Neurology has been consulted. Pt currently intubated/sedated for new midline shift developed over last 24h, GCS=11, following some commands. NSGY consulted    Plan:     1. F/u NSGY recs for ICH/SDH  2. BG control <180  3. Maintain normotensive BP   4. Check lipid panel, a1c, TTE  5. Increase Lipitor to 40 qhs as tolerated   6. Avoid AC/APT at this time 2/2 ICH/SDH  7.  Rest per primary & other consulting teams    Consultations:   IP CONSULT TO NEUROSURGERY  IP CONSULT TO NEUROLOGY      Follow-up further recommendations after discussing the case with attending  The plan was discussed with the patient, patient's family and the medical staff. Patient is admitted as inpatient status because of co-morbidities listed above, severity of signs and symptoms as outlined, requirement for current medical therapies and most importantly because of direct risk to patient if care not provided in a hospital setting.     Venancio Anderson MD  4/13/2022  12:58 PM    Copy sent to Dr. Maria E Alvarenga MD

## 2022-04-13 NOTE — PROGRESS NOTES
Trauma Intensive Care Unit Progress Note    PATIENT NAME: Naty Nunez  YOB: 1950  MEDICAL RECORD NO. [id number]   DATE: 4/13/2022  PRIMARY CARE PHYSICIAN: Maria Dolores Hernandez MD  PATIENT EVALUATED AT THE REQUEST OF DR. Cordova  Day 0   IMPRESSION:     Patient Active Problem List   Diagnosis    [facility]ural [facility]) ([facility] [address]    Fall from standing    Contusion of frontal lobe with loss of consciousness of 30 minutes or less, initial encounter ([facility] [address]    Traumatic subarachnoid hemorrhage with loss of consciousness of 30 minutes or less ([facility] [address]    Cerebellar stroke ([facility] [address]    Closed head injury       MEDICAL DECISION MAKING AND PLAN:            Plan:  CV  HR  67-75  MAP 68-69  SBP control <140 mm hg currently on cardizem drip   F/u EKG  ASA 81 daily, hold for now  Home atorvastatin started    · Heme  Hemoglobin 11.7  INR PTT 1.1/11.5  No TXA/Kcentra  Not taking anticoagulants     Pulm:   · Required Intubation for aiway protection, GCS rapid deterioration. · PF ratio 342  · CXR: tube placement verified  · FiO2 100, PEEP 5 RR 16 Tidal Volume 470  Renal:   · Electrolytes   · Na 136 (goal 145-150) , Na checks each 4 hrs  · K 3.6 , started replacement  · Mg 1.8, goal 4 mg , started replacement   ·   · BUN/Creatinine 12/0.6  · Greer in place  · I/O 1013/1530  T -516 Output 2.4 cc k h    GI:  · NPO, NG in place  · No LFTs or Lipase, will order     ID  TMax 98.2  WBC 9.8  No antibiotic    Neuro:  · Sedation Propofol, Fentanyl. · CT head: Subacute appearing prominent bilateral anterior frontal and temporal hemorrhagic contusions are noted most pronounced at the anterior left frontal lobe with resulting mild rightward shift of the midline  anteriorly. Trace left convexity and posterior right parafalcine subdural hematomas and possible minimal subdural hematoma at the right tentorial leaflet. Nondisplaced right occipital skull fracture.  Overlying scalp hematoma and laceration.   · Repeat CT head STAT · Neurosurgery consulted  · Systolic blood pressure goal <160  · No MAP pushes  · Antiseizures measures Keppra   · Sodium goal 150  ·   Endo:  · Glucose levels 161 - 187  · No history diabetes, continue to monitor blood glucose levels        HISTORY:     Crescencio Hernandez is a 73 y.o. female that presented to the Emergency Department as a transfer from [place] [address]. Patient was drinking wine this evening and fell backwards, hit her head and lost consciousness. Has been witnessed the fall. She had been vomiting at some point prior to the fall however details about this are unclear. Patient is unable to tell us her medical history however we do know from [place] she does not take blood thinners and has an INR of 1.4. CT head at [place] demonstrated subdural hematoma, as well as small subarachnoid hemorrhage as well as hemorrhagic contusions in the anterior bifrontal lobes. On arrival patient had emesis and was complaining of head pain. She says she does not remember falling or anything that happened afterwards. Patient had donated blood earlier on in the day and then went home and drank some wine. Assessment 0600 am    Bedside assessment shows a lethargic patient, with eye opening to pain, with no verbal response and localizing pain with both extremities. Reviewed CT scan with shows 4 mm midline shift. Given neurological deterioration since inital neurological examination, consider rapid deterioration, requiring airway protection , Dr. Marc Good informed about condition who agreed to proceed,Double check of code status is carried aout with Spouse, Michael Cao, Information about risks , benefits of intubation is carried out, Informed neurosurgery team about decision. Intubation procedure describes in significant event note, Chest Xray post indicates tube on correct position.  Will  Continue neuro protective measurements, CTscan STAT       MEDICATIONS:    ASA 81, Lipitor, Oxybutinin, Tizanidin     ALLERGIES: There is no guarding. Musculoskeletal:         General: No swelling or tenderness. Normal range of motion. Cervical back: Neck supple. Skin:     General: Skin is warm and dry. Capillary Refill: Capillary refill takes less than 2 seconds. Neurological:      Comments: GSC 8, Localizes pain with both upper extremities, no verbal response, open eyes with pain           FOCUSED ABDOMINAL SONOGRAM FOR TRAUMA (FAST): A good  quality examination was performed by Dr. Alistair Salazar and representative images were obtained. [x] No free fluid in the abdomen   [] Free fluid in RUQ   [] Free fluid in LUQ  [] Free fluid in Pelvis  [] Pericardial fluid  [] Other:        RADIOLOGY  CT HEAD WO CONTRAST   Final Result   Interval increase in size of extra-axial hematomas over the left parietal   convexity and layering across the falx. Redemonstration of band-like hypodensity within the cerebellum, on the   right-hand side, consistent with acute infarct or nonhemorrhagic contusion. Otherwise significant change from previous exam obtained 4:21 a.m. The findings were sent to the Radiology Results [address] at 11:30   am on 4/13/2022to be communicated to a licensed caregiver. CTA HEAD NECK W CONTRAST   Final Result   No acute arterial injury detected within the head or neck. Incidental findings as detailed above. XR CHEST PORTABLE   Final Result   1. Stable position of support lines and tubes. 2. Persistent bibasilar opacities suggestive of atelectasis. XR ABDOMEN FOR NG/OG/NE TUBE PLACEMENT   Final Result   Enteric tube tip and side port overlie the stomach. XR CHEST PORTABLE   Final Result   Rotated exam.  Status post placement of right internal jugular central venous   catheter, without gross pneumothorax. Determination of line position is   limited given patient rotation. Correlate with catheter function to ensure   venous return.       Bibasilar atelectasis or airspace disease. CT HEAD WO CONTRAST   Final Result   The comparison study is not currently available. Subacute appearing prominent bilateral anterior frontal and temporal   hemorrhagic contusions are noted most pronounced at the anterior left frontal   lobe with resulting mild rightward shift of the midline anteriorly. Trace left convexity and posterior right parafalcine subdural hematomas and   possible minimal subdural hematoma at the right tentorial leaflet. Nondisplaced right occipital skull fracture. Overlying scalp hematoma and   laceration. CT LUMBAR SPINE TRAUMA RECONSTRUCTION   Preliminary Result   1. No acute traumatic injury identified in the chest, abdomen or pelvis,   within the limits of a noncontrast exam.      2.  No acute osseous abnormality identified in the thoracic or lumbar spine. CT THORACIC SPINE TRAUMA RECONSTRUCTION   Preliminary Result   1. No acute traumatic injury identified in the chest, abdomen or pelvis,   within the limits of a noncontrast exam.      2.  No acute osseous abnormality identified in the thoracic or lumbar spine. CT CHEST ABDOMEN PELVIS WO CONTRAST   Preliminary Result   1. No acute traumatic injury identified in the chest, abdomen or pelvis,   within the limits of a noncontrast exam.      2.  No acute osseous abnormality identified in the thoracic or lumbar spine.          MRI BRAIN WO CONTRAST    (Results Pending)   XR CHEST PORTABLE    (Results Pending)       LABS    Labs Reviewed   TRAUMA PANEL - Abnormal; Notable for the following components:       Result Value    Ethanol 23 (*)     Ethanol percent 0.023 (*)     WBC 12.8 (*)     RBC 3.62 (*)     .8 (*)     MCH 34.8 (*)     Glucose 118 (*)     pH, Ulices 7.297 (*)     HCO3, Venous 22.2 (*)     Negative Base Excess, Ulices 3.9 (*)     All other components within normal limits   URINALYSIS - Abnormal; Notable for the following components:    Protein, UA TRACE (*) All other components within normal limits   BASIC METABOLIC PANEL W/ REFLEX TO MG FOR LOW K - Abnormal; Notable for the following components:    Glucose 161 (*)     All other components within normal limits   CBC WITH AUTO DIFFERENTIAL - Abnormal; Notable for the following components:    RBC 3.44 (*)     Hemoglobin 11.7 (*)     Hematocrit 35.9 (*)     .4 (*)     MCH 34.0 (*)     Seg Neutrophils 82 (*)     Lymphocytes 12 (*)     Eosinophils % 0 (*)     Immature Granulocytes 1 (*)     All other components within normal limits   TEG, RAPID CITRATED - Abnormal; Notable for the following components:    ACT .0 (*)     Kinetics Rapid TEG 4.0 (*)     Angle, Rapid TEG 50.1 (*)     MA(Max Clot) Rapid TEG 49.6 (*)     All other components within normal limits   BASIC METABOLIC PANEL W/ REFLEX TO MG FOR LOW K - Abnormal; Notable for the following components:    Calcium 8.5 (*)     Sodium 147 (*)     Chloride 111 (*)     All other components within normal limits   ELECTROLYTES PLUS - Abnormal; Notable for the following components:    POC Chloride 109 (*)     All other components within normal limits   HGB/HCT - Abnormal; Notable for the following components:    POC Hemoglobin 11.7 (*)     POC Hematocrit 35 (*)     All other components within normal limits   HEPATIC FUNCTION PANEL - Abnormal; Notable for the following components:    Albumin 3.2 (*)     Albumin/Globulin Ratio 0.8 (*)     All other components within normal limits   ARTERIAL BLOOD GAS, POC - Abnormal; Notable for the following components:    POC pH 7.325 (*)     POC pCO2 51.9 (*)     POC PO2 324.2 (*)     POC O2  (*)     All other components within normal limits   POCT GLUCOSE - Abnormal; Notable for the following components:    POC Glucose 187 (*)     All other components within normal limits   COVID-19, RAPID   MRSA DNA PROBE, NASAL   CULTURE, URINE   TROPONIN   URINE DRUG SCREEN   MICROSCOPIC URINALYSIS   MAGNESIUM   FIBRINOGEN   MAGNESIUM VITAMIN B12 & FOLATE   CALCIUM, IONIC (POC)   HEMOGLOBIN A1C   LIPID PANEL   SODIUM   SODIUM   VITAMIN B1   BLOOD GAS, ARTERIAL   SODIUM   SODIUM   SODIUM   SODIUM   SODIUM   SODIUM   SODIUM   SODIUM   SODIUM   SODIUM   SODIUM   CREATININE W/GFR POINT OF CARE   UREA N (POC)   LACTIC ACID,POINT OF CARE   POC BLOOD GAS AND CHEMISTRY   POCT GLUCOSE   POCT GLUCOSE   POCT GLUCOSE   TYPE AND SCREEN   PREPARE PLATELETS       Little Bhatti MD  4/13/22, 4:14 PM

## 2022-04-13 NOTE — ED PROVIDER NOTES
Drug use: Not on file    Sexual activity: Not on file   Other Topics Concern    Not on file   Social History Narrative    Not on file     Social Determinants of Health     Financial Resource Strain:     Difficulty of Paying Living Expenses: Not on file   Food Insecurity:     Worried About Running Out of Food in the Last Year: Not on file    Cynthia of Food in the Last Year: Not on file   Transportation Needs:     Lack of Transportation (Medical): Not on file    Lack of Transportation (Non-Medical): Not on file   Physical Activity:     Days of Exercise per Week: Not on file    Minutes of Exercise per Session: Not on file   Stress:     Feeling of Stress : Not on file   Social Connections:     Frequency of Communication with Friends and Family: Not on file    Frequency of Social Gatherings with Friends and Family: Not on file    Attends [employer] Services: Not on file    Active Member of [address] [place] [address] [employer] or Organizations: Not on file    Attends Club or Organization Meetings: Not on file    Marital Status: Not on file   Intimate Partner Violence:     Fear of Current or Ex-Partner: Not on file    Emotionally Abused: Not on file    Physically Abused: Not on file    Sexually Abused: Not on file   Housing Stability:     Unable to Pay for Housing in the Last Year: Not on file    Number of [place] in the Last Year: Not on file    Unstable Housing in the Last Year: Not on file       No family history on file. Allergies:  Patient has no allergy information on record. Home Medications:  Prior to Admission medications    Not on File       REVIEW OF SYSTEMS    (2-9 systems for level 4, 10 or more for level 5)      Review of Systems   Constitutional: Negative for activity change, chills and fever. HENT: Negative for congestion, sinus pressure, sinus pain and sore throat. Eyes: Negative for pain and itching. Respiratory: Negative for cough and shortness of breath.     Cardiovascular: Negative for chest pain.   Gastrointestinal: Negative for abdominal distention, abdominal pain, constipation, diarrhea and nausea. Endocrine: Negative for polyuria. Genitourinary: Negative for dysuria and frequency. Musculoskeletal: Negative for arthralgias. Skin: Negative for rash. Neurological: Positive for headaches. Negative for light-headedness. PHYSICAL EXAM   (up to 7 for level 4, 8 or more for level 5)      INITIAL VITALS:   BP (!) 152/72   Pulse 90   Temp 96.8 °F (36 °C) (Bladder)   Resp 14   SpO2 100%     Physical Exam  Vitals reviewed. Constitutional:       General: She is not in acute distress. Comments: GCS 15   HENT:      Head: Normocephalic. Comments: Large palpable occipital hematoma. Skin intact     Ears:      Comments: Hearing grossly normal     Nose: Nose normal.      Mouth/Throat:      Mouth: Mucous membranes are moist.      Pharynx: Oropharynx is clear. Comments: Dried Blood about the nares and in the oropharynx  Eyes:      General: No scleral icterus. Conjunctiva/sclera: Conjunctivae normal.      Pupils: Pupils are equal, round, and reactive to light. Cardiovascular:      Rate and Rhythm: Normal rate and regular rhythm. Pulses: Normal pulses. Pulmonary:      Effort: Pulmonary effort is normal. No respiratory distress. Breath sounds: Normal breath sounds. Abdominal:      General: There is no distension. Tenderness: There is no abdominal tenderness. There is no guarding. Musculoskeletal:      Cervical back: Neck supple. No rigidity or tenderness. No muscular tenderness. Right lower leg: No edema. Left lower leg: No edema. Comments: Scoliosis of T-L spine noted, left rib hump   Skin:     General: Skin is warm and dry. Capillary Refill: Capillary refill takes less than 2 seconds. Neurological:      General: No focal deficit present. Mental Status: She is alert and oriented to person, place, and time.  Mental status is at baseline.          DIFFERENTIAL  DIAGNOSIS     PLAN (LABS / IMAGING / EKG):  Orders Placed This Encounter   Procedures    COVID-19, Rapid    CT LUMBAR SPINE TRAUMA RECONSTRUCTION    CT THORACIC SPINE TRAUMA RECONSTRUCTION    CT CHEST ABDOMEN PELVIS WO CONTRAST    CT HEAD WO CONTRAST    Trauma Panel    Troponin    Urine Drug Screen    Urinalysis    Basic Metabolic Panel w/ Reflex to MG    CBC with Auto Differential    Magnesium    Microscopic Urinalysis    Diet NPO Exceptions are: Sips of Water with Meds    Vital signs per unit routine    Notify patient's primary care physician of admission    Place intermittent pneumatic compression device    Notify physician    Bedrest - Elevate Head of Bed    Daily weights    Intake and output    Neuro checks    Encourage deep breathing and coughing every two hours while awake    Telemetry monitoring - continuous duration    Full Code    Inpatient consult to Neurosurgery    OT eval and treat    PT evaluation and treat    Initiate Oxygen Therapy Protocol    Incentive spirometry    Speech language pathology evaluation    EKG 12 Lead    Type and Screen    ADMIT TO INPATIENT       MEDICATIONS ORDERED:  Orders Placed This Encounter   Medications    fentaNYL (SUBLIMAZE) injection 50 mcg    labetalol (NORMODYNE;TRANDATE) injection 10 mg    ondansetron (ZOFRAN) 4 MG/2ML injection     HAMILTON KEVIN: cabinet override    fentaNYL (SUBLIMAZE) 100 MCG/2ML injection     HAMILTON KEVIN: cabinet override    labetalol (NORMODYNE;TRANDATE) 5 MG/ML injection     HAMILTON KEVIN: cabinet override    ondansetron (ZOFRAN) injection 4 mg    sodium chloride flush 0.9 % injection 5-40 mL    sodium chloride flush 0.9 % injection 5-40 mL    0.9 % sodium chloride infusion    OR Linked Order Group     ondansetron (ZOFRAN-ODT) disintegrating tablet 4 mg     ondansetron (ZOFRAN) injection 4 mg    polyethylene glycol (GLYCOLAX) packet 17 g    0.9 % sodium chloride infusion    sennosides-docusate sodium (SENOKOT-S) 8.6-50 MG tablet 1 tablet    acetaminophen (TYLENOL) tablet 1,000 mg    gabapentin (NEURONTIN) capsule 300 mg    oxyCODONE (ROXICODONE) immediate release tablet 2.5 mg    PARoxetine (PAXIL) tablet 20 mg    atorvastatin (LIPITOR) tablet 10 mg    levETIRAcetam (KEPPRA) 500 mg/100 mL IVPB       DIAGNOSTIC RESULTS / EMERGENCY DEPARTMENT COURSE / MDM   LAB RESULTS:  Results for orders placed or performed during the hospital encounter of 04/13/22   COVID-19, Rapid    Specimen: Nasopharyngeal Swab   Result Value Ref Range    Specimen Description . NASOPHARYNGEAL SWAB     SARS-CoV-2, Rapid Not Detected Not Detected   Trauma Panel   Result Value Ref Range    Ethanol 23 (H) <10 mg/dL    Ethanol percent 0.023 (H) <0.010 %    Blood Bank Specimen BILL FOR SERVICES PERFORMED     BUN 13 8 - 23 mg/dL    WBC 12.8 (H) 3.5 - 11.3 k/uL    RBC 3.62 (L) 3.95 - 5.11 m/uL    Hemoglobin 12.6 11.9 - 15.1 g/dL    Hematocrit 38.3 36.3 - 47.1 %    .8 (H) 82.6 - 102.9 fL    MCH 34.8 (H) 25.2 - 33.5 pg    MCHC 32.9 28.4 - 34.8 g/dL    RDW 12.9 11.8 - 14.4 %    Platelets 512 803 - 244 k/uL    MPV 9.4 8.1 - 13.5 fL    NRBC Automated 0.0 0.0 per 100 WBC    CREATININE 0.63 0.50 - 0.90 mg/dL    Glucose 118 (H) 70 - 99 mg/dL    hCG Qual NEGATIVE NEGATIVE    Sodium 143 135 - 144 mmol/L    Potassium 4.1 3.7 - 5.3 mmol/L    Chloride 105 98 - 107 mmol/L    CO2 23 20 - 31 mmol/L    Anion Gap 15 9 - 17 mmol/L    Protime 11.5 9.1 - 12.3 sec    INR 1.1     PTT 29.3 20.5 - 30.5 sec    pH, Ulices 7.297 (L) 7.320 - 7.420    pCO2, Ulices 46.9 39 - 55    pO2, Ulices 40.9 30 - 50    HCO3, Venous 22.2 (L) 24 - 30 mmol/L    Negative Base Excess, Ulices 3.9 (H) 0.0 - 2.0 mmol/L    O2 Sat, Ulices 71.6 60.0 - 85.0 %    Carboxyhemoglobin 4.7 0 - 5 %    Pt Temp 37.0     FIO2 UNKNOWN    Troponin   Result Value Ref Range    Troponin, High Sensitivity 11 0 - 14 ng/L   Urine Drug Screen   Result Value Ref Range    Amphetamine Screen, Ur NEGATIVE NEGATIVE    Barbiturate Screen, Ur NEGATIVE NEGATIVE    Benzodiazepine Screen, Urine NEGATIVE NEGATIVE    Cocaine Metabolite, Urine NEGATIVE NEGATIVE    Methadone Screen, Urine NEGATIVE NEGATIVE    Opiates, Urine NEGATIVE NEGATIVE    Phencyclidine, Urine NEGATIVE NEGATIVE    Cannabinoid Scrn, Ur NEGATIVE NEGATIVE    Oxycodone Screen, Ur NEGATIVE NEGATIVE    Test Information       Assay provides medical screening only. The absence of expected drug(s) and/or metabolite(s) may indicate diluted or adulterated urine, limitations of testing or timing of collection. Urinalysis   Result Value Ref Range    Color, UA Yellow Yellow    Turbidity UA Clear Clear    Glucose, Ur NEGATIVE NEGATIVE    Bilirubin Urine NEGATIVE NEGATIVE    Ketones, Urine NEGATIVE NEGATIVE    Specific Gravity, UA 1.013 1.005 - 1.030    Urine Hgb NEGATIVE NEGATIVE    pH, UA 6.5 5.0 - 8.0    Protein, UA TRACE (A) NEGATIVE    Urobilinogen, Urine Normal Normal    Nitrite, Urine NEGATIVE NEGATIVE    Leukocyte Esterase, Urine NEGATIVE NEGATIVE   Microscopic Urinalysis   Result Value Ref Range    -          WBC, UA None 0 - 5 /HPF    RBC, UA 2 TO 5 0 - 4 /HPF    Casts UA  0 - 8 /LPF     0 TO 2 HYALINE Reference range defined for non-centrifuged specimen. Epithelial Cells UA 0 TO 2 0 - 5 /HPF   TYPE AND SCREEN   Result Value Ref Range    Expiration Date 04/16/2022,2359     Arm Band Number BE [id number]     ABO/Rh B NEGATIVE     Antibody Screen NEGATIVE        IMPRESSION: Jeannine Givens is a 73 y.o. woman presenting for fall w/SDH and SAH. GCS 15 but emesis on arrival. Airway is intact, she is answering questions appropriately and protecting her airway    RADIOLOGY:  CT CHEST ABDOMEN PELVIS WO CONTRAST    Result Date: 4/13/2022  1. No acute traumatic injury identified in the chest, abdomen or pelvis, within the limits of a noncontrast exam. 2.  No acute osseous abnormality identified in the thoracic or lumbar spine.      CT LUMBAR SPINE TRAUMA RECONSTRUCTION    Result Date: 4/13/2022  1. No acute traumatic injury identified in the chest, abdomen or pelvis, within the limits of a noncontrast exam. 2.  No acute osseous abnormality identified in the thoracic or lumbar spine. CT THORACIC SPINE TRAUMA RECONSTRUCTION    Result Date: 4/13/2022  1. No acute traumatic injury identified in the chest, abdomen or pelvis, within the limits of a noncontrast exam. 2.  No acute osseous abnormality identified in the thoracic or lumbar spine. EKG  none    All EKG's are interpreted by the Emergency Department Physician who either signs or Co-signs this chart in the absence of a cardiologist.    EMERGENCY DEPARTMENT COURSE:  Patient seen and evaluated, VSS and nontoxic in appearance. No further injuries noted on trauma scans. No airway concerns. Admitted to trauma ICU for close monitoring pending repeat CTH. PROCEDURES:  none    CONSULTS:  IP CONSULT TO NEUROSURGERY    CRITICAL CARE:  See attending note    FINAL IMPRESSION      1. SDH (subdural hematoma) ([address]    2. Fall, initial encounter    3. Closed head injury, initial encounter    4. Traumatic hematoma of scalp, initial encounter          DISPOSITION / [address] Admitted 04/13/2022 01:12:40 AM      PATIENT REFERRED TO:  No follow-up provider specified. DISCHARGE MEDICATIONS:  There are no discharge medications for this patient.       Pily Felix DO  Emergency Medicine Resident    (Please note that portions of thisnote were completed with a voice recognition program.  Efforts were made to edit the dictations but occasionally words are mis-transcribed.)       Pily Felix DO  Resident  04/13/22 [id number]

## 2022-04-13 NOTE — PROGRESS NOTES
Trauma & neuro at bedside, decision to intubate, please see resident's note. 20 etomidate 0656  80 rocc 0657    7.5 Tube in @ 0658  23 @ teeth, 25 @ lip        Bilateral breath sounds, + color change. Propofol started for sedation. Xrays placed.

## 2022-04-13 NOTE — PROGRESS NOTES
Neurosurgery MASTER/Resident    Daily Progress Note   CC:  Chief Complaint   Patient presents with    Fall     4/13/2022  6:27 AM    Chart reviewed. No acute events overnight. No new complaints. New admit overnight, fall from standing height, CT head from [place] showing bifrontal contusions which has significantly blossomed this morning, midline shift 4mm to right along with decline in mentation from GCS 14- 11. Trauma team planning on intubation due to decline in mentation. NA level this mornig went from 143-136, she was given 250cc bolus of 3% saline, at one point in the night her heart rate decreased to the 40s unsure how long this went on for, current HR was 78, she vomited multiple times overnight and became more lethargic which is why repeat CT head was completed. She was started on Keppra for seizure prophylaxis. She is apparently a daily drinker.  She donated blood yesterday and then had three glasses of wine per EHR    Vitals:    04/13/22 0345 04/13/22 0400 04/13/22 0430 04/13/22 0445   BP: (!) 188/64 (!) 160/59  (!) 153/57   Pulse: 54 65 64 66   Resp: 15 13 9 17   Temp:       TempSrc:       SpO2: 99% 100% 95% 94%   Weight:       Height:           PE:   NEUROLOGIC:  EYE OPENING     Spontaneous - 4 []       To voice - 3 [x]       To pain - 2 []       None - 1 []    VERBAL RESPONSE     Appropriate, oriented - 5 []       Dazed or confused - 4 []       Syllables, expletives - 3 []       Grunts - 2 [x]       None - 1 []    MOTOR RESPONSE     Spontaneous, command - 6 [x]       Localizes pain - 5 []       Withdraws pain - 4 []       Abnormal flexion - 3 []       Abnormal extension - 2 []       None - 1 []             Total GCS: 11     Very lethargic  Hardly opening eyes to voice  PERRL  Some tremors noted BUE  Aphasic and not speaking   Following simple commands BUE and BLE squeezing hands and wiggles toes    Lab Results   Component Value Date    WBC 9.8 04/13/2022    HGB 11.7 (L) 04/13/2022    HCT 35.9 (L) 04/13/2022     04/13/2022     04/13/2022    K 3.8 04/13/2022     04/13/2022    CREATININE 0.60 04/13/2022    BUN 12 04/13/2022    CO2 23 04/13/2022    INR 1.1 04/13/2022       Radiology   CT HEAD WO CONTRAST    Result Date: 4/13/2022  EXAMINATION: CT OF THE HEAD WITHOUT CONTRAST  4/13/2022 3:31 am TECHNIQUE: CT of the head was performed without the administration of intravenous contrast. Dose modulation, iterative reconstruction, and/or weight based adjustment of the mA/kV was utilized to reduce the radiation dose to as low as reasonably achievable. COMPARISON: None available despite provided history. HISTORY: ORDERING SYSTEM PROVIDED HISTORY: follow up on SAH/SDH TECHNOLOGIST PROVIDED HISTORY: follow up on SAH/SDH Reason for Exam: follow up on SAH/SDH FINDINGS: BRAIN/VENTRICLES: Hemorrhagic contusions at the bilateral anterior frontal and temporal lobes are noted most pronounced at the anterior left frontal lobe. Quite minimal left convexity and posterior right parafalcine subdural hematomas are present. Possible minor subdural hematoma at right tentorial leaflet. There appears to be approximately 4 mm midline shift anteriorly due to the large anterior left frontal hemorrhagic contusion. There is also partial effacement of the left lateral ventricle. ORBITS: The visualized portion of the orbits demonstrate no acute abnormality. SINUSES: The visualized paranasal sinuses and mastoid air cells demonstrate no acute abnormality. SOFT TISSUES/SKULL:  There is a nondisplaced right occipital skull fracture. Mild scalp hematoma and laceration are present posterior on the right. The comparison study is not currently available. Subacute appearing prominent bilateral anterior frontal and temporal hemorrhagic contusions are noted most pronounced at the anterior left frontal lobe with resulting mild rightward shift of the midline anteriorly.  Trace left convexity and posterior right parafalcine subdural hematomas and possible minimal subdural hematoma at the right tentorial leaflet. Nondisplaced right occipital skull fracture. Overlying scalp hematoma and laceration. XR CHEST PORTABLE    Result Date: 4/13/2022  EXAMINATION: ONE XRAY VIEW OF THE CHEST 4/13/2022 6:11 am COMPARISON: CT chest dated 04/13/2022 HISTORY: ORDERING SYSTEM PROVIDED HISTORY: line place TECHNOLOGIST PROVIDED HISTORY: line place Reason for Exam: supine port FINDINGS: Patient is rotated. Right internal jugular central venous catheter projects at the thoracic midline. No pneumothorax. Bibasilar heterogeneous opacity is demonstrated. No significant pleural effusion. Cardiac and mediastinal silhouettes are reflective of patient rotation. Rotated exam.  Status post placement of right internal jugular central venous catheter, without gross pneumothorax. Determination of line position is limited given patient rotation. Correlate with catheter function to ensure venous return. Bibasilar atelectasis or airspace disease. CT CHEST ABDOMEN PELVIS WO CONTRAST    1. No acute traumatic injury identified in the chest, abdomen or pelvis, within the limits of a noncontrast exam. 2.  No acute osseous abnormality identified in the thoracic or lumbar spine. CT LUMBAR SPINE TRAUMA RECONSTRUCTION    1. No acute traumatic injury identified in the chest, abdomen or pelvis, within the limits of a noncontrast exam. 2.  No acute osseous abnormality identified in the thoracic or lumbar spine. CT THORACIC SPINE TRAUMA RECONSTRUCTION    1. No acute traumatic injury identified in the chest, abdomen or pelvis, within the limits of a noncontrast exam. 2.  No acute osseous abnormality identified in the thoracic or lumbar spine.          A/P  73 y.o. female who presents with fall, bifrontal hemorrhagic contusions, 4mm MLS, right occipital skull fracture      - Obtain CT Head in 6 hours at 0930    - Neurosurgical intervention pending CT head  once reviewed by attending neurosurgeon   - Martha Hinkle all antiplatelets and anticoagulants - SCD for DVT prophylaxis   - Neuro checks per floor protocol  - continue sodium checks at this time goal 150  - monitor for alcohol withdrawals   - continue Keppra for seizure prophylaxis   - maintain Normotensive SBP       Please contact neurosurgery with any changes in patients neurologic status.        Yamileth Corcoran CNP  4/13/22  6:27 AM

## 2022-04-13 NOTE — ED NOTES
The following labs labeled with pt sticker and tubed to lab:     [x] Trauma Panel     [] Cloyce Marine   [] on ice  [x] Green/yellow (trop)  [] Green/black [] on ice  [] Yellow  [] Red  [] Pink      [] COVID-19 swab    [] Rapid  [] PCR  [] Flu swab  [] Peds Viral Panel     [] Urine Sample  [] Pelvic Cultures  [] Blood Cultures            Jada Salcido RN  04/13/22 0031

## 2022-04-13 NOTE — PROGRESS NOTES
Occupational [address] [employer]  Occupational Therapy Not Seen Note    DATE: 2022    NAME: Jaswinder Myers  MRN: [id number]   : 1950      Patient not seen this date for Occupational Therapy due to:    Patient is not appropriate for active participation in OT evaluation/treatment at this time d/t being vented this AM at 0700, hold OT eval.    Next Scheduled Treatment: attempt on  as appropriate    Electronically signed by Laxmi Pitt on 2022 at 8:21 AM

## 2022-04-13 NOTE — PROGRESS NOTES
Comprehensive Nutrition Assessment    Type and Reason for Visit:  Initial, vent    Nutrition Recommendations/Plan: Start nutrition as able. If TF needed, suggest Immune Enhancing Formula goal 40 mL/hr to provide 1440 kcal and 90 g pro/day (+additional 150 kcal/day from propofol at current rate). Will follow/monitor care plans. Nutrition Assessment:  Pt admitted with SDH s/p fall. Pt is currently intubated. No nutrition support at present.  reports pt had good appetite/intake PTA. No unintentional weight loss recently. Meds/labs reviewed. Malnutrition Assessment:  Malnutrition Status:  Insufficient data    Context:  Acute Illness     Findings of the 6 clinical characteristics of malnutrition:  Energy Intake:  No significant decrease in energy intake  Weight Loss:  No significant weight loss     Body Fat Loss:  Unable to assess     Muscle Mass Loss:  Unable to assess    Fluid Accumulation:  1 - Mild Extremities   Strength:  Not Performed    Estimated Daily Nutrient Needs:  Energy (kcal):  1600 kcal/day; Weight Used for Energy Requirements:  Current     Protein (g):  75-95 g pro/day; Weight Used for Protein Requirements:  Current (1.2-1.5)        Fluid (ml/day):  1800 mL/day; Method Used for Fluid Requirements:  ml/Kg (28)      Nutrition Related Findings:  meds/labs reviewed      Wounds:  None       Current Nutrition Therapies:    Diet NPO Exceptions are: Sips of Water with Meds  Additional Calorie Sources:   Propofol at 5.7 mL/hr =150 kcal/day    Anthropometric Measures:  · Height: 5' 6\" (167.6 cm)  · Current Body Weight: 138 lb 13.5 oz (63 kg)   · Ideal Body Weight: 130 lbs; % Ideal Body Weight 106.8 %   · BMI: 22.4  · BMI Categories: Normal Weight (BMI 18.5-24. 9)       Nutrition Diagnosis:   · Inadequate oral intake related to acute injury/trauma,impaired respiratory function as evidenced by NPO or clear liquid status due to medical condition    Nutrition Interventions:   Food and/or Nutrient Delivery: Start nutrition as able. If TF needed, suggest Immune Enhancing Formula goal 40 mL/hr to provide 1440 kcal and 90 g pro/day (+additional 150 kcal/day from propofol at current rate). Nutrition Education/Counseling:  No recommendation at this time   Coordination of Nutrition Care:  Continue to monitor while inpatient    Goals:  meet % of estimated nutrient needs       Nutrition Monitoring and Evaluation:   Behavioral-Environmental Outcomes:  None Identified   Food/Nutrient Intake Outcomes:  Diet Advancement/Tolerance  Physical Signs/Symptoms Outcomes:  Biochemical Data,Nutrition Focused Physical Findings,Skin,Weight,Fluid Status or Edema     Discharge Planning:     Too soon to determine     Electronically signed by Alena Villa RD, LD on 4/13/22 at 3:19 PM EDT    Contact: [phone]

## 2022-04-13 NOTE — PROCEDURES
Arterial Line Placement Procedure Note    DATE: 4/13/2022  RE: Edmond Mansifeld   1950    PREOPERATIVE DIAGNOSIS:  Hypertension with worsening SAH    POSTOPERATIVE DIAGNOSIS:  Same    INDICATION:  Need or invasive blood pressure monitoring. OPERATION PERFORMED:  Placement of a 20 Gauge  Arterial line in right radial artery    Performed by: Varinder Bay PGY-3    ASSISTANT(S):  Alexandre Allan PGY-1    ANESTHESIA:  None    Procedure: Sterile technique was initiated (hand washing, gown, cap, mask, gloves, draping) before the skin over the right radial artery was prepped with alcohol. The vessel was identified with a 22 Ga. introducer needle and a guide wire was placed without difficulty. Then, a 21 Ga. catheter was easily threaded. Good waveform obtained on monitor and line withdrew and flushed well. A-line was then secured with skin sutures, and dressed. Complications: None    Alysa Purcell DO  5:17 AM  Secured, tolerated well. Present.

## 2022-04-13 NOTE — PROCEDURES
Insert Arterial Line     Date/Time: 4/13/2022 3:24 PM  Performed by: Buster Keys MD  Authorized by: Buster Keys MD   Consent: Verbal consent obtained. Risks and benefits: risks, benefits and alternatives were discussed  Consent given by: spouse  Procedure consent: procedure consent matches procedure scheduled  Relevant documents: relevant documents present and verified  Test results: test results available and properly labeled  Site marked: the operative site was marked  Imaging studies: imaging studies available  Required items: required blood products, implants, devices, and special equipment available  Patient identity confirmed: arm band  Time out: Immediately prior to procedure a \"time out\" was called to verify the correct patient, procedure, equipment, support staff and site/side marked as required. Preparation: Patient was prepped and draped in the usual sterile fashion. Indications: hemodynamic monitoring  Location: right femoral  Anesthesia: local infiltration   Anesthesia:  Local Anesthetic: lidocaine 1% without epinephrine  Anesthetic total: 5 mL     Sedation:  Patient sedated: yes  Sedatives: fentanyl and propofol  Vitals: Vital signs were monitored during sedation.     Needle gauge: 20  Seldinger technique used: US guided.   Number of attempts: 2  Post-procedure: line sutured and dressing applied  Patient tolerance: patient tolerated the procedure well with no immediate complications  Comments: Dr Yung Shah present during all the procedure, Dr. Brianna Schulz available immediately

## 2022-04-13 NOTE — SIGNIFICANT EVENT
Trauma Tertiary Survey    Admit Date: 4/13/2022  Hospital day 0    [facility]     No past medical history on file. Scheduled Meds:   sodium chloride flush  5-40 mL IntraVENous 2 times per day    polyethylene glycol  17 g Oral Daily    sennosides-docusate sodium  1 tablet Oral BID    acetaminophen  1,000 mg Oral 3 times per day    gabapentin  300 mg Oral 3 times per day    [Held by provider] PARoxetine  20 mg Oral Daily    atorvastatin  10 mg Oral Nightly    [Held by provider] folic acid  1 mg Oral Daily    [Held by provider] amLODIPine  5 mg Per NG tube Daily    levETIRAcetam  500 mg Oral BID    thiamine  500 mg Oral Daily    famotidine (PEPCID) injection  20 mg IntraVENous BID    metoclopramide  10 mg Oral Q6H     Continuous Infusions:   sodium chloride      sodium chloride Stopped (04/13/22 0644)    niCARdipine 2.5 mg/hr (04/13/22 1324)    propofol 10 mcg/kg/min (04/13/22 0831)    fentaNYL 50 mcg/hr (04/13/22 0820)    sodium chloride 30 mL/hr (04/13/22 1621)    sodium chloride       PRN Meds:sodium chloride flush, sodium chloride, ondansetron **OR** ondansetron, sodium chloride    Subjective:     Patient has no complaint of pain. Tertiary assessment is perform based on physical examination , evident deformities, bruising, hematoma or additional lesions, It will be completed once the patient is extubated.     Objective:     Patient Vitals for the past 8 hrs:   BP Pulse Resp SpO2 Height   04/13/22 1600 (!) 149/43 62 16 96 % --   04/13/22 1545 -- 50 17 97 % --   04/13/22 1530 (!) 148/41 52 16 96 % --   04/13/22 1515 (!) 128/40 50 17 97 % --   04/13/22 1510 -- -- -- -- 5' 6\" (1.676 m)   04/13/22 1500 (!) 148/41 (!) 46 16 96 % --   04/13/22 1445 (!) 144/39 51 16 96 % --   04/13/22 1430 (!) 160/40 (!) 47 16 94 % --   04/13/22 1415 (!) 148/39 51 16 97 % --   04/13/22 1400 (!) 147/43 53 16 96 % --   04/13/22 1345 (!) 147/43 50 16 97 % --   04/13/22 1330 (!) 152/44 (!) 47 16 96 % --   04/13/22 1315 (!) 147/43 57 16 98 % --   04/13/22 1300 (!) 150/42 56 16 98 % --   04/13/22 1245 (!) 156/44 54 21 99 % --   04/13/22 1230 (!) 152/45 56 16 98 % --   04/13/22 1215 (!) 141/45 54 14 98 % --   04/13/22 1200 (!) 139/44 53 22 99 % --   04/13/22 1145 (!) 133/44 52 16 99 % --   04/13/22 1130 (!) 135/44 (!) 48 15 98 % --   04/13/22 1117 -- 52 14 97 % --   04/13/22 1115 (!) 134/42 53 12 98 % --   04/13/22 1100 (!) 129/40 50 16 98 % --   04/13/22 1045 (!) 130/40 55 14 98 % --   04/13/22 1000 (!) 116/37 (!) 48 16 96 % --   04/13/22 0945 (!) 116/37 (!) 46 16 96 % --   04/13/22 0930 (!) 106/37 (!) 44 16 100 % --   04/13/22 0915 (!) 125/37 (!) 49 12 100 % --   04/13/22 0900 (!) 115/38 (!) 41 17 100 % --   04/13/22 0845 (!) 117/37 (!) 46 14 100 % --       I/O last 3 completed shifts: In: 1013.3 [I.V.:562.3; IV Piggyback:451]  Out: 4030 [OVLXP:0308]  I/O this shift: In: 90.3 [I.V.:40.3; NG/GT:50]  Out: -     Radiology:Interval increase in size of extraaxial hematomas over the left parietal convexity and layering across the falx. Redemostration of band-like hypodensity within the cerebellum, on the right hand side, consistent with acute infarct or non hemorrhagic contusion. PHYSICAL EXAM:   GCS:  1 - Does not open eyes   5 - Pushes away noxious stimulus  1 - Makes no noise    Pupil size:  Left 1 mm Right 1 mm  Pupil reaction: Yes  Wiggles fingers: Left no Right No  Hand grasp:   Left normal   Right normal  Wiggles toes: Left No    Right No  Plantar flexion: Left normal  Right normal        BP (!) 149/43   Pulse 62   Temp 98.2 °F (36.8 °C) (Bladder)   Resp 16   Ht 5' 6\" (1.676 m)   Wt 138 lb 13.5 oz (63 kg)   SpO2 96%   BMI 22.41 kg/m²   General appearance: uncooperative  Head: scalp contusion, Occipital, no evident deformitiy  Eyes: conjunctivae/corneas clear. PERRL, EOM's intact. Fundi benign. Ears: normal TM's and external ear canals both ears  Nose: Nares normal. Septum midline.  Mucosa normal. No drainage or sinus tenderness.   Throat: lips, mucosa, and tongue normal; teeth and gums normal  Neck: no adenopathy, no carotid bruit, no JVD, supple, symmetrical, trachea midline and thyroid not enlarged, symmetric, no tenderness/mass/nodules  Lungs: clear to auscultation bilaterally  Breasts: normal appearance, no masses or tenderness  Heart: regular rate and rhythm, S1, S2 normal, no murmur, click, rub or gallop  Abdomen: soft, non-tender; bowel sounds normal; no masses,  no organomegaly  Extremities: extremities normal, atraumatic, no cyanosis or edema and no edema, redness or tenderness in the calves or thighs  Pulses: 2+ and symmetric  Skin: Skin color, texture, turgor normal. No rashes or lesions or petequial lesions in both hands  Lymph nodes: Cervical, supraclavicular, and axillary nodes normal.  Neurologic: Mental status: Alert, oriented, thought content appropriate, lethargic, GCS 8 previous intubation, currently RASS -3  .    Spine:     Spine Tenderness ROM   Cervical 0 /10 Not Indicated   Thoracic 0 /10 Not Indicated   Lumbar 0 /10 Not Indicated     Musculoskeletal    Joint Tenderness Swelling ROM   Right shoulder Will Assess after extubation  absent normal   Left shoulder Will Assess after extubation  absent normal   Right elbow Will Assess after extubation  absent normal   Left elbow  Will Assess after extubation  absent normal   Right wrist Will Assess after extubation  absent normal   Left wrist  Will Assess after extubation  absent normal   Right hand grasp  Will Assess after extubation  absent normal   Left hand grasp  Will Assess after extubation  absent normal   Right hip Will Assess after extubation  absent normal   Left hip  Will Assess after extubation  absent normal   Right knee Will Assess after extubation  absent normal   Left knee  Will Assess after extubation  absent normal   Right ankle Will Assess after extubation  absent normal   Left ankle Will Assess after extubation  absent normal   Right foot  Will Assess after extubation  absent normal   Left foot Will Assess after extubation  absent normal           CONSULTS: NS, Neurology    PROCEDURES: MRI, Intubation, A line, Central Line    INJURIES:    Frontoparietal hemorrhagic contusion  Cerebellar stroke vs contusion      Patient Active Problem List   Diagnosis    SDH (subdural hematoma) (HCC)    Fall from standing    Contusion of frontal lobe with loss of consciousness of 30 minutes or less, initial encounter [facility] [address]    Traumatic subarachnoid hemorrhage with loss of consciousness of 30 minutes or less ([address]    Cerebellar stroke ([address]    Closed head injury         DISPOSITION:   Continue ICU

## 2022-04-13 NOTE — ED NOTES
Report given to [facility], RN in TICU. Questions answered.       Angel Claudio RN  04/13/22 1203

## 2022-04-13 NOTE — ED NOTES
The following labs labeled with pt sticker and tubed to lab:     [] Blue     [] Lavender   [] on ice  [] Green/yellow  [] Green/black [] on ice  [] Yellow  [] Red  [] Pink      [] COVID-19 swab    [] Rapid  [] PCR  [] Flu swab  [] Peds Viral Panel     [x] Urine Sample  [] Pelvic Cultures  [] Blood Cultures            Siomara Boyce RN  04/13/22 0125

## 2022-04-13 NOTE — ED PROVIDER NOTES
[facility] ED  EMERGENCY DEPARTMENT ENCOUNTER      Pt Name: Fly Arreaga  MRN: [id number]  [place] 1950  Date of evaluation: 4/12/2022  Provider: Gurmeet Arevalo MD    [address] [place] [address]       Chief Complaint   Patient presents with    Fall     mechanical fall, tripped and hit head on cement patio, no LOC, no blood thinners, hematoma noted to posterior of head, pt admits to ETOH         HISTORY OF PRESENT ILLNESS   (Location/Symptom, Timing/Onset, Context/Setting, Quality, Duration, Modifying Factors, Severity)  Note limiting factors. Fly Arreaga is a 73 y.o. female who presents to the emergency department turn for nonsyncopal fall with head trauma no loss consciousness. Patient is not on any anticoagulation. Admits to alcohol consumption tonight. Denies any acute focal neuro deficits complains of a posterior headache. HPI    Nursing Notes were reviewed. REVIEW OF SYSTEMS    (2-9 systems for level 4, 10 or more for level 5)     Review of Systems   All other systems reviewed and are negative. Except as noted above the remainder of the review of systems was reviewed and negative. PAST MEDICAL HISTORY     Past Medical History:   Diagnosis Date    Abnormal patient-activated cardiac event monitor 09/06/2017    sinus tachycardia with PSVT lasting 36 sec @ 165 bpm.    Cataract     H/O echocardiogram 08/15/2017    EF 60%. Mildly increased LV wall thickness normal LV cavity size. No definite specific wall motion abnormalities were identified. No significant valvular abnormalities Mild diastolic dysfunction is seen.     Hypertension     Primary cancer of bone marrow [facility])          SURGICAL HISTORY       Past Surgical History:   Procedure Laterality Date    CARDIAC CATHETERIZATION Left 08/16/2017    left radial/[facility]/Dr Garcia    HYSTERECTOMY  1996    UPPER GASTROINTESTINAL ENDOSCOPY N/A 7/21/2018    EGD BIOPSY performed by Lance Cisneros MD at [address] CURRENT MEDICATIONS       Discharge Medication List as of 4/12/2022 10:39 PM      CONTINUE these medications which have NOT CHANGED    Details   topiramate (TOPAMAX) 25 MG tablet TAKE 2 TABLETS EVERY NIGHT, Disp-180 tablet, R-1Normal      b complex vitamins capsule Take 1 capsule by mouth dailyHistorical Med      aspirin 81 MG EC tablet Take 81 mg by mouth dailyHistorical Med      atorvastatin (LIPITOR) 10 MG tablet Take 1 tablet by mouth daily, Disp-90 tablet, R-3Normal      celecoxib (CELEBREX) 200 MG capsule Take 200 mg by mouth 2 times dailyHistorical Med      tiZANidine (ZANAFLEX) 4 MG tablet Take 4 mg by mouth 3 times daily Historical Med      vitamin B-12 (CYANOCOBALAMIN) 1000 MCG tablet Take 1,000 mcg by mouth dailyHistorical Med      valACYclovir (VALTREX) 500 MG tablet Take 1,000 mg by mouth 2 times daily as needed Historical Med      Cholecalciferol (VITAMIN D) 2000 units CAPS capsule Take by mouth daily Historical Med      oxybutynin (DITROPAN) 5 MG tablet Take 5 mg by mouth 3 times dailyHistorical Med      calcium carbonate (OSCAL) 500 MG TABS tablet Take 1,200 mg by mouth dailyHistorical Med      Multiple Vitamins-Minerals (THERAPEUTIC MULTIVITAMIN-MINERALS) tablet Take 1 tablet by mouth dailyHistorical Med      PARoxetine (PAXIL) 20 MG tablet Take 20 mg by mouth every morningHistorical Med             ALLERGIES     Coricidin hbp cold-flu [chlorpheniramine-acetaminophen]    FAMILY HISTORY       Family History   Problem Relation Age of Onset    Lupus Mother     Cancer Father         leukemia    Brain Cancer Sister           SOCIAL HISTORY       Social History     Socioeconomic History    Marital status:      Spouse name: None    Number of children: None    Years of education: None    Highest education level: None   Occupational History    None   Tobacco Use    Smoking status: Current Every Day Smoker     Packs/day: 0.50     Years: 6.00     Pack years: 3.00     Types: Cigarettes Last attempt to quit: 2021     Years since quittin.7    Smokeless tobacco: Never Used   Vaping Use    Vaping Use: Never used   Substance and Sexual Activity    Alcohol use: Yes     Comment: 3 or 4 glasses with dinner    Drug use: No    Sexual activity: None   Other Topics Concern    None   Social History Narrative    None     Social Determinants of Health     Financial Resource Strain:     Difficulty of Paying Living Expenses: Not on file   Food Insecurity:     Worried About Running Out of Food in the Last Year: Not on file    Cynthia of Food in the Last Year: Not on file   Transportation Needs:     Lack of Transportation (Medical): Not on file    Lack of Transportation (Non-Medical):  Not on file   Physical Activity:     Days of Exercise per Week: Not on file    Minutes of Exercise per Session: Not on file   Stress:     Feeling of Stress : Not on file   Social Connections:     Frequency of Communication with Friends and Family: Not on file    Frequency of Social Gatherings with Friends and Family: Not on file    Attends Judaism Services: Not on file    Active Member of [address] [place] [address] or Organizations: Not on file    Attends Club or Organization Meetings: Not on file    Marital Status: Not on file   Intimate Partner Violence:     Fear of Current or Ex-Partner: Not on file    Emotionally Abused: Not on file    Physically Abused: Not on file    Sexually Abused: Not on file   Housing Stability:     Unable to Pay for Housing in the Last Year: Not on file    Number of [place] in the Last Year: Not on file    Unstable Housing in the Last Year: Not on file       SCREENINGS         [place] Coma Scale  Eye Opening: Spontaneous  Best Verbal Response: Oriented  Best Motor Response: Obeys commands  [place] Coma Scale Score: 15                     CIWA Assessment  BP: (!) 204/50  Pulse: 60                 PHYSICAL EXAM    (up to 7 for level 4, 8 or more for level 5)     ED Triage Vitals   BP Temp Temp Source Pulse Resp SpO2 Height Weight   -- 04/12/22 2109 04/12/22 2109 04/12/22 2109 04/12/22 2111 -- 04/12/22 2109 04/12/22 2109    97.4 °F (36.3 °C) Tympanic 60 12  5' 6\" (1.676 m) 138 lb (62.6 kg)       Physical Exam  Constitutional:       General: She is not in acute distress. Appearance: She is well-developed. She is not diaphoretic. HENT:      Head:      Comments: Posterior scalp hematoma appreciated. No clinical signs of basilar skull fracture. Right Ear: Tympanic membrane and external ear normal.      Left Ear: Tympanic membrane and external ear normal.   Eyes:      General:         Right eye: No discharge. Left eye: No discharge. Extraocular Movements: Extraocular movements intact. Pupils: Pupils are equal, round, and reactive to light. Neck:      Trachea: No tracheal deviation. Cardiovascular:      Rate and Rhythm: Normal rate and regular rhythm. Heart sounds: No murmur heard. No friction rub. Pulmonary:      Effort: Pulmonary effort is normal. No respiratory distress. Breath sounds: No stridor. No wheezing or rales. Chest:      Chest wall: No tenderness. Abdominal:      General: There is no distension. Palpations: Abdomen is soft. There is no mass. Tenderness: There is no abdominal tenderness. There is no guarding or rebound. Musculoskeletal:         General: No tenderness or deformity. Normal range of motion. Cervical back: Normal range of motion. Skin:     General: Skin is warm. Findings: No erythema or rash. Neurological:      General: No focal deficit present. Mental Status: She is alert and oriented to person, place, and time.          DIAGNOSTIC RESULTS     EKG: All EKG's are interpreted by the Emergency Department Physician who either signs or Co-signs this chart in the absence of a cardiologist.        RADIOLOGY:   Non-plain film images such as CT, Ultrasound and MRI are read by the radiologist. [facility] radiographic images are visualized and preliminarily interpreted by the emergency physician with the below findings:        Interpretation per the Radiologist below, if available at the time of this note:    CT Cervical Spine WO Contrast   Final Result   Acute subdural hematoma along the bifrontal cerebral convexities measuring up   to 4 mm on the left and 3 mm on the right. Hemorrhagic contusions are noted   involving the anterior bifrontal lobes. Small subarachnoid hemorrhage is   noted interdigitating along the bifrontal cerebral sulcations. No evidence of acute fracture or traumatic malalignment of the cervical spine. RECOMMENDATIONS:   Critical results were called by Dr. Sola Medina to Dr. Nathalie Galicia on   4/12/2022 at 21:33. Unavailable         CT Head WO Contrast   Final Result   Acute subdural hematoma along the bifrontal cerebral convexities measuring up   to 4 mm on the left and 3 mm on the right. Hemorrhagic contusions are noted   involving the anterior bifrontal lobes. Small subarachnoid hemorrhage is   noted interdigitating along the bifrontal cerebral sulcations. No evidence of acute fracture or traumatic malalignment of the cervical spine. RECOMMENDATIONS:   Critical results were called by Dr. Sola Medina to Dr. Nathalie Galicia on   4/12/2022 at 21:33.       Unavailable               ED BEDSIDE ULTRASOUND:   Performed by ED Physician - none    LABS:  Labs Reviewed   CBC WITH AUTO DIFFERENTIAL - Abnormal; Notable for the following components:       Result Value    RBC 3.49 (*)     .0 (*)     MCH 34.7 (*)     Lymphocytes 44 (*)     All other components within normal limits   PROTIME-INR - Abnormal; Notable for the following components:    Protime 15.6 (*)     All other components within normal limits   BASIC METABOLIC PANEL W/ REFLEX TO MG FOR LOW K - Abnormal; Notable for the following components:    Glucose 116 (*)     Bun/Cre Ratio 22 (*)     Potassium 3.6 (*) All other components within normal limits   APTT       All other labs were within normal range or not returned as of this dictation. EMERGENCY DEPARTMENT COURSE and DIFFERENTIAL DIAGNOSIS/MDM:   Vitals:    Vitals:    04/12/22 2109 04/12/22 2111 04/12/22 2128 04/12/22 2200   BP: (!) 180/45  (!) 185/51 (!) 204/50   Pulse: 60      Resp:  12     Temp: 97.4 °F (36.3 °C)      TempSrc: Tympanic      SpO2: 96%  95% 94%   Weight: 138 lb (62.6 kg)      Height: 5' 6\" (1.676 m)              MDM  Number of Diagnoses or Management Options  Subarachnoid bleed (HCC)  Subdural hematoma (HCC)  Diagnosis management comments: 82-year-old female present with complaint of nonsyncopal fall with head trauma no loss conscious. Imaging was consistent with subdural hematoma and subarachnoid patient GCS was 15 however due to no neurosurgery service available here same days Dr. Moncia Maldonado spoken to accepted the patient neurosurgeon also at [facility] V's were spoken to. [facility] was initiated. Time being transferred patient was maintaining her airway and was otherwise acutely clinically stable. REASSESSMENT     ED Course as of 04/13/22 1938 Tue Apr 12, 2022 2218 Monocytes: 7 [KH]      ED Course User Index  [KH] Ambreen Dong MD         CRITICAL CARE TIME   Total Critical Care time was minutes, excluding separately reportable procedures. There was a high probability of clinically significant/life threatening deterioration in the patient's condition which required my urgent intervention. CONSULTS:  None    PROCEDURES:  Unless otherwise noted below, none     Procedures        FINAL IMPRESSION      1. Subarachnoid bleed (HCC)    2. Subdural hematoma (HCC)          DISPOSITION/PLAN   DISPOSITION        PATIENT REFERRED TO:  No follow-up provider specified. DISCHARGE MEDICATIONS:  Discharge Medication List as of 4/12/2022 10:39 PM        Controlled Substances Monitoring:     No flowsheet data found.     (Please note that portions of this note were completed with a voice recognition program.  Efforts were made to edit the dictations but occasionally words are mis-transcribed.)    Ira Koenig MD (electronically signed)  Attending Emergency Physician            Ira Koenig MD  04/12/22 1109       Ira Koenig MD  04/13/22 0084

## 2022-04-13 NOTE — CONSULTS
[address]    Department of Neurosurgery  Resident Consult Note      Reason for Consult:  Traumatic fall bilateral frontal SDH with contusion and traumatic [facility]  Requesting Physician:  Dr. Nancy Harden MD   Neurosurgeon:   []Dr. Liu Mao  []Dr. Levon Bae  []Dr. Robbi Tee  []Dr. Silvano Lopez  [x]Dr. Avani Novoa  []Dr. Wang Standing    History Obtained From:  patient, spouse, EMR review     CHIEF COMPLAINT:     Fall backward with LOC     HISTORY OF PRESENT ILLNESS:     Sal Madsen is a 73  female who presents with traumatic fall while intoxicated falling backwards with LOC. PMH significant for primary bone cancer, HTN, CHF. Patient currently somnolent although arousable to voice, following simple commands. Patient states it is 2024 and march thus loosing 2 points for LOC otherwise neurologically in tact denies any headache, N&V, chest pain, neck or back pain and appropriate. Patient was under the influence of alcohol at the time of her fall which was witnessed by her  sounding mechanical in nature when described by patient and . Patient does not recall exactly what occurred and does not remember hitting her head. Patient is lethargic at this time appears post concussive. No visual deficits or focal neurologic Deficits. Hypertensive on arrival SBP 180s given IV labetalol with good response SBP 140s after labetalol.  improved to 62 also after labetalol temperature 97. 3. initial trauma panel ETOH .023, glucose 118, cr .63, wbc 12.8, hgb 12.6, platelets 268, , Na 143, INR 1.1, PT-11.5, PTT-39.2. patients initial CT head performed around 9PM thus planning for 6 hour repeat CT head stability at 3AM. Will continue to monitor neurologic exam closely otherwise and SBP <160.        ICH SCORE  (Estimate 30 Day Mortality)   POINTS   Age    < 80  ? 80     [x] 0  [] 1   GCS:    13-15                                 5-12                                   3-4         [x] 0  [] 1  [] 2   ICH Volume     < 30 mL (cc)                      ? 30 mL (cc)      [x] 0  [] 1   IVH      No                                     Yes      [x] 0  [] 1   Location    Supratentorial Origin                   Infratentorial Origin     [x] 0  [] 1   TOTAL 0   0= 10%             1=13%           2= 26%              3=72%             4 = 97%         5 & 6 = 100%    PAST MEDICAL HISTORY :       Past Medical History:    No past medical history on file. Past Surgical History:    No past surgical history on file. Social History:   Social History     Socioeconomic History    Marital status: Not on file     Spouse name: Not on file    Number of children: Not on file    Years of education: Not on file    Highest education level: Not on file   Occupational History    Not on file   Tobacco Use    Smoking status: Not on file    Smokeless tobacco: Not on file   Substance and Sexual Activity    Alcohol use: Not on file    Drug use: Not on file    Sexual activity: Not on file   Other Topics Concern    Not on file   Social History Narrative    Not on file     Social Determinants of Health     Financial Resource Strain:     Difficulty of Paying Living Expenses: Not on file   Food Insecurity:     Worried About Running Out of Food in the Last Year: Not on file    Cynthia of Food in the Last Year: Not on file   Transportation Needs:     Lack of Transportation (Medical): Not on file    Lack of Transportation (Non-Medical):  Not on file   Physical Activity:     Days of Exercise per Week: Not on file    Minutes of Exercise per Session: Not on file   Stress:     Feeling of Stress : Not on file   Social Connections:     Frequency of Communication with Friends and Family: Not on file    Frequency of Social Gatherings with Friends and Family: Not on file    Attends Caodaism Services: Not on file    Active Member of Clubs or Organizations: Not on file    Attends Club or Organization Meetings: Not on file   [place] Marital Status: Not on file   Intimate Partner Violence:     Fear of Current or Ex-Partner: Not on file    Emotionally Abused: Not on file    Physically Abused: Not on file    Sexually Abused: Not on file   Housing Stability:     Unable to Pay for Housing in the Last Year: Not on file    Number of [place] in the Last Year: Not on file    Unstable Housing in the Last Year: Not on file       Family History:   No family history on file. Allergies:   Patient has no allergy information on record. Home Medications:  Prior to Admission medications    Not on File       Current Medications:   Current Facility-Administered Medications: levETIRAcetam (KEPPRA) 500 mg/100 mL IVPB, 500 mg, IntraVENous, Q12H    REVIEW OF SYSTEMS:       General ROS - patient appears post-concussive, somnolent  Ophthalmic ROS - No changes in vision from baseline  ENT ROS -  No sore throat, no rhinorrhea  Respiratory ROS - no cough, no shortness of breath  Cardiovascular ROS - No chest pain  Gastrointestinal ROS - No abdominal pain, no nausea, no vomiting  Genito-Urinary ROS - No dysuria  Musculoskeletal ROS - No neck pain, no back pain  Neurological ROS - No focal weakness or loss of sensation, no headache  Dermatological ROS - skin noted to have spider angiomata diffusely and petechial bruising in right arm   Vascular/Lymphatic ROS - No edema    PHYSICAL EXAM:       Vitals:    04/13/22 0117   BP: (!) 148/66   Pulse: 62   Resp: 21   Temp:    SpO2: 97%       CONSTITUTIONAL:  Well developed, well nourished, alert and oriented to person not month or year, in no acute distress, nontoxic. No dysarthria, no aphasia. EOMI.     HEAD:  normocephalic, atraumatic    EYES:  PERRLA, EOMI   ENT:  moist mucous membranes, no stridor, no tracheal deviation   NECK:  no midline tenderness to palpation, no step-offs or deformities   BACK:  no midline tenderness to palpation, no step-offs or deformities    LUNGS:  CTAB, equal air entry bilaterally, no wheezes or rales   CARDIOVASCULAR:  RRR, no murmur   ABDOMEN:  Soft, no rigidity   NEUROLOGIC:  EYE OPENING     Spontaneous - 4 [x]       To voice - 3 []       To pain - 2 []       None - 1 []    VERBAL RESPONSE     Appropriate, oriented - 5 []       Dazed or confused - 4 [x]       Syllables, expletives - 3 []       Grunts - 2 []       None - 1 []    MOTOR RESPONSE     Spontaneous, command - 6 [x]       Localizes pain - 5 []       Withdraws pain - 4 []       Abnormal flexion - 3 []       Abnormal extension - 2 []       None - 1 []            Total GCS: 14    Mental Status:  Not oriented to date, somnolent             Cranial Nerves:    cranial nerves II-XII are grossly intact    Motor Exam:    Drift:  absent  Tone:  normal    Motor exam is symmetrical 5 out of 5 all extremities bilaterally    Sensory:    Touch:    Right Upper Extremity:  normal  Left Upper Extremity:  normal  Right Lower Extremity:  normal  Left Lower Extremity:  normal    Deep Tendon Reflexes:    Right Bicep:  2+  Left Bicep:  2+  Right Knee:  2+  Left Knee:  2+    Plantar Response:    Right:  downgoing  Left:  downgoing    Clonus:  absent  Mao's:  absent    Coordination/Dysmetria:  Finger to Nose:   Right:  abnormal - difficulty Right arm>left   Left:  abnormal -     SKIN:  Diffuse spider angiomata      LABS AND IMAGING:     Labs:  CBC with Differential:    Lab Results   Component Value Date    WBC 12.8 04/13/2022    RBC 3.62 04/13/2022    HGB 12.6 04/13/2022    HCT 38.3 04/13/2022     04/13/2022    .8 04/13/2022    MCH 34.8 04/13/2022    MCHC 32.9 04/13/2022    RDW 12.9 04/13/2022     BMP:    Lab Results   Component Value Date     04/13/2022    K 4.1 04/13/2022     04/13/2022    CO2 23 04/13/2022    BUN 13 04/13/2022    CREATININE 0.63 04/13/2022    GLUCOSE 118 04/13/2022       Radiology Review:      CT head WO 4/12/22 @21:17  -Bilateral frontal contusions with traumatic appearing SAH and thin SDH         CT thoracic spine and lumbar spine 4/13/22  Impression   1.  No acute traumatic injury identified in the chest, abdomen or pelvis,   within the limits of a noncontrast exam.       2.  No acute osseous abnormality identified in the thoracic or lumbar spine. CT chest/abdomen/pelvis  4/13/22  Impression   1.  No acute traumatic injury identified in the chest, abdomen or pelvis,   within the limits of a noncontrast exam.       2.  No acute osseous abnormality identified in the thoracic or lumbar spine. ASSESSMENT AND PLAN:       Patient Active Problem List   Diagnosis    Subdural hematoma ([address]    Fall from standing    Contusion of frontal lobe with loss of consciousness of 30 minutes or less, initial encounter ([address]    Traumatic subarachnoid hemorrhage with loss of consciousness of 30 minutes or less ([address]       A/P:  Trauma Naa Davis is a 82 y.o. female who presents with fall backwards Positive LOC transferred from outlying facility. Patient found to have SDH transferred for further neurologic assessment. Patient care will be discussed with attending, will reevaluate patient along with attending     - No neurosurgical interventions planned for now   - prophylactic keppra 500mg BID given bilateral SDH and left frontal IPH/SDH X 7 days    - Repeat CT head WO at 6 hours from original at [place] which was 9PM 4/12 thus repeat CT head WO at [facility] 4/13  - CTLS recommendations: Clear   - HOB: 30 degrees   - Neuro checks per protocol  - Hold all antiplatelets and anticoagulants  - We recommend SBP < 160   - Determine the lower limit of SBP clinically based on mentation      Additional recommendations may follow    Please contact neurosurgery with any changes in patient's neurologic status. Thank you for your consult.        Halley Resendiz MD    PGY-3 Neurology Resident   Neurosurgery Team - pager [phone]  4/13/2022 1:44 AM

## 2022-04-13 NOTE — PLAN OF CARE
Nutrition Problem #1: Inadequate oral intake  Intervention: Food and/or Nutrient Delivery:  (Start nutrition as able. If TF needed, suggest Immune Enhancing Formula goal 40 mL/hr to provide 1440 kcal and 90 g pro/day (+additional 150 kcal/day from propofol at current rate). )  Nutritional Goals: meet % of estimated nutrient needs

## 2022-04-13 NOTE — FLOWSHEET NOTE
[facility] CARE DEPARTMENT - [address]     Emergency/Trauma Note    PATIENT NAME: Felicia Martinez    Shift date: 4/12/2022  Shift day: Tuesday   Shift # 3    Room # TRAUMA A   Name: Felicia Martinez            Age: 73 y.o. Gender: female          Oriental orthodox: No Confucianist on file   Place of Anglican: Unknown    Trauma/Incident type: Adult Trauma Priority  Admit Date & Time: 4/13/2022 12:10 AM  TRAUMA NAME: Abhijit Nails    ADVANCE DIRECTIVES IN CHART? No    NAME OF DECISION MAKER: Per next of kin hierarchy, patient's decision maker is Harish, unless otherwise specified. RELATIONSHIP OF DECISION MAKER TO PATIENT: Patient's Spouse    PATIENT/EVENT DESCRIPTION:  Felicia Martinez is a 73 y.o. female who arrived to [facility] A via [facility] and was paged out as an \"Adult Trauma Priority,\" due to a \"Fall. \" Patient was awake and talking upon arrival to the hospital and became \"nauseous,\" during initial medical assessment. Pt to be admitted to 0172/0172-01. SPIRITUAL ASSESSMENT/INTERVENTION:   responded to page and gathered patient information outside room.  learned that patient's spouse was present with patient at [facility] and is expected to come to the hospital.  visited with patient and affirmed that  will reach out to spouse shortly.  learned that patient's spouse arrived to the waiting room.  introduced herself to patient's spouse and escorted him to bedside, per nurse approval. Patient and spouse were coping well and thanked  for support. PATIENT BELONGINGS:  No belongings noted    ANY BELONGINGS OF SIGNIFICANT VALUE NOTED:  N/A    REGISTRATION STAFF NOTIFIED?   Yes      WHAT IS YOUR SPIRITUAL CARE PLAN FOR THIS PATIENT?:  [facility] can make follow-up visit, per request. Royal Pereyra can be reached 24/7 via [employer].     04/13/22 0017   Encounter Summary   Services provided to: Patient and family together   Referral/Consult From: Multi-disciplinary team  (Adult Trauma Priority)   Support System Spouse; Family members   Continue Visiting   (4/12/2022)   Complexity of Encounter High   Length of Encounter 1 hour   Spiritual Assessment Completed Yes   Routine   Type Initial   Crisis   Type Trauma   Spiritual/Jainism   Type Spiritual support   Assessment Approachable; Anxious; Fearful;Helplessness;Coping   Intervention Active listening;Explored feelings, thoughts, concerns;Explored coping resources;Nurtured hope;Sustaining presence/ Ministry of presence; Discussed illness/injury and it's impact   Outcome Comfort;Expressed gratitude;Coping;Receptive     Electronically signed by Treva Odom on 4/13/2022 at [facility]  [phone]

## 2022-04-14 NOTE — PLAN OF CARE
Problem: Pain:  Goal: Pain level will decrease  Description: Pain level will decrease  Outcome: Ongoing  Goal: Control of acute pain  Description: Control of acute pain  Outcome: Ongoing  Goal: Control of chronic pain  Description: Control of chronic pain  Outcome: Ongoing     Problem: Falls - Risk of:  Goal: Will remain free from falls  Description: Will remain free from falls  Outcome: Ongoing  Goal: Absence of physical injury  Description: Absence of physical injury  Outcome: Ongoing     Problem: Skin Integrity:  Goal: Will show no infection signs and symptoms  Description: Will show no infection signs and symptoms  Outcome: Ongoing  Goal: Absence of new skin breakdown  Description: Absence of new skin breakdown  Outcome: Ongoing     Problem: Confusion - Acute:  Goal: Absence of continued neurological deterioration signs and symptoms  Description: Absence of continued neurological deterioration signs and symptoms  Outcome: Ongoing  Goal: Mental status will be restored to baseline  Description: Mental status will be restored to baseline  Outcome: Ongoing     Problem: Discharge Planning:  Goal: Ability to perform activities of daily living will improve  Description: Ability to perform activities of daily living will improve  Outcome: Ongoing  Goal: Participates in care planning  Description: Participates in care planning  Outcome: Ongoing     Problem: Injury - Risk of, Physical Injury:  Goal: Will remain free from falls  Description: Will remain free from falls  Outcome: Ongoing  Goal: Absence of physical injury  Description: Absence of physical injury  Outcome: Ongoing     Problem: Mood - Altered:  Goal: Mood stable  Description: Mood stable  Outcome: Ongoing  Goal: Absence of abusive behavior  Description: Absence of abusive behavior  Outcome: Ongoing  Goal: Verbalizations of feeling emotionally comfortable while being cared for will increase  Description: Verbalizations of feeling emotionally comfortable while being cared for will increase  Outcome: Ongoing     Problem: Psychomotor Activity - Altered:  Goal: Absence of psychomotor disturbance signs and symptoms  Description: Absence of psychomotor disturbance signs and symptoms  Outcome: Ongoing     Problem: Sensory Perception - Impaired:  Goal: Demonstrations of improved sensory functioning will increase  Description: Demonstrations of improved sensory functioning will increase  Outcome: Ongoing  Goal: Decrease in sensory misperception frequency  Description: Decrease in sensory misperception frequency  Outcome: Ongoing  Goal: Able to refrain from responding to false sensory perceptions  Description: Able to refrain from responding to false sensory perceptions  Outcome: Ongoing  Goal: Demonstrates accurate environmental perceptions  Description: Demonstrates accurate environmental perceptions  Outcome: Ongoing  Goal: Able to distinguish between reality-based and nonreality-based thinking  Description: Able to distinguish between reality-based and nonreality-based thinking  Outcome: Ongoing  Goal: Able to interrupt nonreality-based thinking  Description: Able to interrupt nonreality-based thinking  Outcome: Ongoing     Problem: Sleep Pattern Disturbance:  Goal: Appears well-rested  Description: Appears well-rested  Outcome: Ongoing     Problem: OXYGENATION/RESPIRATORY FUNCTION  Goal: Patient will maintain patent airway  4/14/2022 0113 by Ash Smith RN  Outcome: Ongoing     Problem: OXYGENATION/RESPIRATORY FUNCTION  Goal: Patient will achieve/maintain normal respiratory rate/effort  Description: Respiratory rate and effort will be within normal limits for the patient  4/14/2022 0113 by Ash Smith RN  Outcome: Ongoing     Problem: MECHANICAL VENTILATION  Goal: Patient will maintain patent airway  4/14/2022 0113 by Ash Smith RN  Outcome: Ongoing     Problem: MECHANICAL VENTILATION  Goal: Oral health is maintained or improved  4/14/2022 0113 by Kirit Murray

## 2022-04-14 NOTE — SIGNIFICANT EVENT
Quattro catheter placement    Date/Time: 4/14/2022 7:38 PM  Performed by: Suzi Nguyen MD  Authorized by: Suzi Nguyen MD   Consent: Verbal consent obtained. Risks and benefits: risks, benefits and alternatives were discussed  Consent given by: spouse  Relevant documents: relevant documents present and verified  Test results: test results available and properly labeled  Site marked: the operative site was marked  Patient identity confirmed: arm band  Time out: Immediately prior to procedure a \"time out\" was called to verify the correct patient, procedure, equipment, support staff and site/side marked as required. Indications: central pressure monitoring (TTM)  Anesthesia: local infiltration    Anesthesia:  Local Anesthetic: lidocaine 1% without epinephrine  Anesthetic total: 5 mL    Sedation:  Patient sedated: yes  Sedatives: fentanyl  Vitals: Vital signs were monitored during sedation.     Preparation: skin prepped with 2% chlorhexidine  Skin prep agent dried: skin prep agent completely dried prior to procedure  Sterile barriers: all five maximum sterile barriers used - cap, mask, sterile gown, sterile gloves, and large sterile sheet  Hand hygiene: hand hygiene performed prior to central venous catheter insertion  Location details: left femoral  Patient position: Trendelenburg  Catheter type: triple lumen (Quattro catheter)  Pre-procedure: landmarks identified  Ultrasound guidance: yes  Sterile ultrasound techniques: sterile gel and sterile probe covers were used  Number of attempts: 2  Successful placement: yes  Post-procedure: line sutured and dressing applied  Assessment: blood return through all ports and free fluid flow  Patient tolerance: patient tolerated the procedure well with no immediate complications  Comments: Dr. Jennifer Merrill present during all procedure

## 2022-04-14 NOTE — CARE COORDINATION
Transitional planning:  Met with Dr. Joel Veras, poor prognosis. 1000 Met with JUDY Ramirez, pt donated blood, had 3 glasses of wine and fell. Injury to head.

## 2022-04-14 NOTE — PROGRESS NOTES
Trauma Intensive Care Unit Progress Note    PATIENT NAME: Rik Tello  YOB: 1950  MEDICAL RECORD NO. [id number]   DATE: 4/14/2022  PRIMARY CARE PHYSICIAN: Maria Fernanda Hawley MD    Day 1   IMPRESSION:     Patient Active Problem List   Diagnosis    SDH (subdural hematoma) ([facility]    Fall from standing    Contusion of frontal lobe with loss of consciousness of 30 minutes or less, initial encounter ([facility]    Traumatic subarachnoid hemorrhage with loss of consciousness of 30 minutes or less ([facility]    [facility]    Closed head injury       MEDICAL DECISION MAKING AND PLAN:            Plan:  CV  Lactate trend  0.8  HR  58-65  MAP 67-69  SBP control <140 mm hg    - Requiring initially Cardizem drip for SBP >140 mm hg    - Overnight episodes of hypotension requiring Vasopressor support   - Will use as needed  EKG First degree AV block  ASA 81 daily, hold for now  Home atorvastatin started, increased to 40 mg daily for findings of stroke     · Heme  No anticoagulation history  Hemoglobin   9.0 (11.7)  Platelets 092   - Received 1 unit platelets for abnormal TEG   - Will follow new TEG  INR PTT 1.1 / 11.5  No TXA/Kcentra       Pulm:   · Respiratory failure SP trauma (GCS deterioration).   · PF ratio 313   · ABGs (4/14 5am) Respiratory acidosis  Ph 7.29 PCO2 54 Poo2 143 Fio2 40  · Increase expiratory time , new Abgs 30 min    · CXR: Tube is correct, small pleural effusion right side , old granulomatous   · FiO2 40, PEEP 12 RR 20 Tidal Volume 470  · Increase RR to 24, decrease Ins time 0.9--0.7 new Abgs 30 minutes  Renal:   · Electrolytes   · Na 146  (145) (goal 145-150)   · Hypertonic solution stopped for rapid increase in Na levels  · Na checks each 4 hrs  · K  4.1 (3.6 )  · Mg 2.4 , goal 4 mg , started replacement   ·   · BUN/Creatinine 16/0.8 (12/0.6)  · Greer in place  · I/O 2494/375  T +2119 Output 0.2 cc k h   · Fluid assessment (POCUS bedside)    GI:  · Tube feeds 30 cc h ,   ·  LT 6 AST 12    · Started on Reglan    · Will order ammonia/lipase levels      ID  TMax  37.3  WBC  9.2  No antibiotic started     Neuro:  · Multi modal Pain Agitation Delirium Sedation   · Sedation Fentanyl, Propofol  · Pain Tylenol, Gabapentin   · CT head: Subacute appearing prominent bilateral anterior frontal and temporal hemorrhagic contusions are noted most pronounced at the anterior left frontal lobe with resulting mild rightward shift of the midline  anteriorly. Trace left convexity and posterior right parafalcine subdural hematomas and possible minimal subdural hematoma at the right tentorial leaflet. Nondisplaced right occipital skull fracture.  Overlying scalp hematoma and laceration. · MRI 4/13 Cerebellar Stroke   · Neurology Following up  · Ct head scan 4/14: no acute changes, Midline shift 3 mm   · Neurosurgery Following up  · Systolic blood pressure goal <160  · No MAP pushes  · Antiseizures measures Keppra   · Sodium goal 150  · Hypertonic solution started     Endo:  · Glucose levels   · Insuline high sliding scale for > 180 mg dl   · No history diabetes, continue to monitor blood glucose levels    Lines  A line, JAGRUTI Greer. Dispo  Remains in ICU            HISTORY:     Roland Farah is a 73 y.o. female that presented to the Emergency Department as a transfer from [facility]. Patient was drinking wine this evening and fell backwards, hit her head and lost consciousness. Has been witnessed the fall. She had been vomiting at some point prior to the fall however details about this are unclear. Patient is unable to tell us her medical history however we do know from [place] she does not take blood thinners and has an INR of 1.4. CT head at [place] demonstrated subdural hematoma, as well as small subarachnoid hemorrhage as well as hemorrhagic contusions in the anterior bifrontal lobes. On arrival patient had emesis and was complaining of head pain.   She says she does not remember falling or anything that happened afterwards. Patient had donated blood earlier on in the day and then went home and drank some wine. Assessment 0600 am    Bedside assessment remains sedated, ventilated, vasopressor support, moves 4 extremities to pain, bilateral simmetrical b.sounds. Will do volume assessment , new TEG, Increasing in RR and decrease Ins. Time, will replace electrolytes, order ammonia and lipase. MEDICATIONS:    ASA 81, Lipitor, Oxybutinin, Tizanidin     ALLERGIES:    Information not available due to exam limitations documented above     PAST MEDICAL HISTORY:  Hypertension and bone cancer  Hysterectomy    FAMILY HISTORY    Unknown family history    SOCIAL HISTORY   Information not available due to exam limitations documented above    Pt drinks alcohol    PERTINENT SYSTEMIC REVIEW:      Information not available due to exam limitations documented above        PHYSICAL EXAMINATION:     [address] TO ARRIVAL     [x]No        []Yes      Variable  Score   Variable  Score  Eye opening []Spontaneous 4 Verbal  []Oriented  5     []To voice  3   []Confused  4    [x]To pain  2   []Inapp words  3    []None  1   []Incomp words 2       [x]None  1   Motor   []Obeys  6    [x]Localizes pain 5    []Withdraws(pain) 4    []Flexion(pain) 3  []Extension(pain) 2     []None  1      GCS Total = 8    PHYSICAL EXAMINATION    VITAL SIGNS:   Vitals:    04/14/22 0749   BP:    Pulse: (!) 48   Resp: 24   Temp:    SpO2: 100%       Physical Exam  Constitutional:       General: She is not in acute distress. Appearance: She is not toxic-appearing. HENT:      Head:      Comments: Hematoma over posterior head, no active bleeding      Right Ear: External ear normal.      Left Ear: External ear normal.      Nose: Nose normal.      Mouth/Throat:      Mouth: Mucous membranes are dry. Pharynx: Oropharynx is clear. Eyes:      Pupils: Pupils are equal, round, and reactive to light.       Comments: Bilateral myosis , responds to light   Cardiovascular:      Rate and Rhythm: Normal rate and regular rhythm. Pulses: Normal pulses. Heart sounds: No murmur heard. No friction rub. No gallop. Pulmonary:      Effort: Pulmonary effort is normal. No respiratory distress. Breath sounds: No wheezing or rales. Abdominal:      General: Abdomen is flat. There is no distension. Palpations: Abdomen is soft. Tenderness: There is no abdominal tenderness. There is no guarding. Musculoskeletal:         General: No swelling or tenderness. Normal range of motion. Cervical back: Neck supple. Skin:     General: Skin is warm and dry. Capillary Refill: Capillary refill takes less than 2 seconds. Neurological:      Comments: GSC 8, Localizes pain with both upper extremities, no verbal response, open eyes with pain           FOCUSED ABDOMINAL SONOGRAM FOR TRAUMA (FAST): A good  quality examination was performed by Dr. Mandy Kovacs and representative images were obtained. [x] No free fluid in the abdomen   [] Free fluid in RUQ   [] Free fluid in LUQ  [] Free fluid in Pelvis  [] Pericardial fluid  [] Other:        RADIOLOGY  CT HEAD WO CONTRAST   Final Result   No significant interval change in intraparenchymal and extra-axial blood   products. 3 mm of midline shift from left to right again demonstrated. RECOMMENDATIONS:   Unavailable         XR CHEST PORTABLE   Final Result   Stable tubes and lines. Mild interstitial prominence at the right lower lung zone, possibly chronic. Possible tiny right pleural effusion. Evidence of old granulomatous disease. MRI BRAIN WO CONTRAST   Final Result   Right posteroinferior cerebellar stroke. Multifocal areas of intracranial hemorrhage largest involving the left   frontal lobe measuring 5.1 x 6.3 cm in size with left-to-right midline shift   measuring 8 mm and associated subfalcine herniation and left-sided uncal   herniation. Otherwise mild-to-moderate subarachnoid hemorrhage and small subdural   collections greatest on left as noted above. Critical results were called by Dr. Cierra Prajapati to [facility]  on 4/13/2022 at 1834   hours         CT HEAD WO CONTRAST   Final Result   Interval increase in size of extra-axial hematomas over the left parietal   convexity and layering across the falx. Redemonstration of band-like hypodensity within the cerebellum, on the   right-hand side, consistent with acute infarct or nonhemorrhagic contusion. Otherwise significant change from previous exam obtained 4:21 a.m. The findings were sent to the Radiology Results [address] at 11:30   am on 4/13/2022to be communicated to a licensed caregiver. CTA HEAD NECK W CONTRAST   Final Result   No acute arterial injury detected within the head or neck. Incidental findings as detailed above. XR CHEST PORTABLE   Final Result   1. Stable position of support lines and tubes. 2. Persistent bibasilar opacities suggestive of atelectasis. XR ABDOMEN FOR NG/OG/NE TUBE PLACEMENT   Final Result   Enteric tube tip and side port overlie the stomach. XR CHEST PORTABLE   Final Result   Rotated exam.  Status post placement of right internal jugular central venous   catheter, without gross pneumothorax. Determination of line position is   limited given patient rotation. Correlate with catheter function to ensure   venous return. Bibasilar atelectasis or airspace disease. CT HEAD WO CONTRAST   Final Result   The comparison study is not currently available. Subacute appearing prominent bilateral anterior frontal and temporal   hemorrhagic contusions are noted most pronounced at the anterior left frontal   lobe with resulting mild rightward shift of the midline anteriorly.       Trace left convexity and posterior right parafalcine subdural hematomas and   possible minimal subdural hematoma at the right tentorial leaflet. Nondisplaced right occipital skull fracture. Overlying scalp hematoma and   laceration. CT LUMBAR SPINE TRAUMA RECONSTRUCTION   Preliminary Result   1. No acute traumatic injury identified in the chest, abdomen or pelvis,   within the limits of a noncontrast exam.      2.  No acute osseous abnormality identified in the thoracic or lumbar spine. CT THORACIC SPINE TRAUMA RECONSTRUCTION   Preliminary Result   1. No acute traumatic injury identified in the chest, abdomen or pelvis,   within the limits of a noncontrast exam.      2.  No acute osseous abnormality identified in the thoracic or lumbar spine. CT CHEST ABDOMEN PELVIS WO CONTRAST   Preliminary Result   1. No acute traumatic injury identified in the chest, abdomen or pelvis,   within the limits of a noncontrast exam.      2.  No acute osseous abnormality identified in the thoracic or lumbar spine.              LABS    Labs Reviewed   TRAUMA PANEL - Abnormal; Notable for the following components:       Result Value    Ethanol 23 (*)     Ethanol percent 0.023 (*)     WBC 12.8 (*)     RBC 3.62 (*)     .8 (*)     MCH 34.8 (*)     Glucose 118 (*)     pH, Ulices 7.297 (*)     HCO3, Venous 22.2 (*)     Negative Base Excess, Ulices 3.9 (*)     All other components within normal limits   URINALYSIS - Abnormal; Notable for the following components:    Protein, UA TRACE (*)     All other components within normal limits   BASIC METABOLIC PANEL W/ REFLEX TO MG FOR LOW K - Abnormal; Notable for the following components:    Glucose 161 (*)     All other components within normal limits   CBC WITH AUTO DIFFERENTIAL - Abnormal; Notable for the following components:    RBC 3.44 (*)     Hemoglobin 11.7 (*)     Hematocrit 35.9 (*)     .4 (*)     MCH 34.0 (*)     Seg Neutrophils 82 (*)     Lymphocytes 12 (*)     Eosinophils % 0 (*)     Immature Granulocytes 1 (*)     All other components within normal limits   TEG, RAPID CITRATED - Abnormal; Notable for the following components:    ACT .0 (*)     Kinetics Rapid TEG 4.0 (*)     Angle, Rapid TEG 50.1 (*)     MA(Max Clot) Rapid TEG 49.6 (*)     All other components within normal limits   BASIC METABOLIC PANEL W/ REFLEX TO MG FOR LOW K - Abnormal; Notable for the following components:    Calcium 8.5 (*)     Sodium 147 (*)     Chloride 111 (*)     All other components within normal limits   SODIUM - Abnormal; Notable for the following components:    Sodium 145 (*)     All other components within normal limits   ELECTROLYTES PLUS - Abnormal; Notable for the following components:    POC Chloride 109 (*)     All other components within normal limits   HGB/HCT - Abnormal; Notable for the following components:    POC Hemoglobin 11.7 (*)     POC Hematocrit 35 (*)     All other components within normal limits   SODIUM - Abnormal; Notable for the following components:    Sodium 146 (*)     All other components within normal limits   HEPATIC FUNCTION PANEL - Abnormal; Notable for the following components:    Albumin 3.2 (*)     Albumin/Globulin Ratio 0.8 (*)     All other components within normal limits   SODIUM - Abnormal; Notable for the following components:    Sodium 149 (*)     All other components within normal limits   BASIC METABOLIC PANEL W/ REFLEX TO MG FOR LOW K - Abnormal; Notable for the following components:    Glucose 143 (*)     Calcium 8.5 (*)     Sodium 146 (*)     Chloride 116 (*)     Anion Gap 8 (*)     All other components within normal limits   CBC WITH AUTO DIFFERENTIAL - Abnormal; Notable for the following components:    RBC 2.57 (*)     Hemoglobin 9.0 (*)     Hematocrit 28.2 (*)     .7 (*)     MCH 35.0 (*)     Platelets 868 (*)     Seg Neutrophils 80 (*)     Lymphocytes 12 (*)     Absolute Lymph # 1.07 (*)     All other components within normal limits   HEPATIC FUNCTION PANEL - Abnormal; Notable for the following components:    Albumin 2.9 (*) Albumin/Globulin Ratio 0.8 (*)     All other components within normal limits   CBC - Abnormal; Notable for the following components:    RBC 2.47 (*)     Hemoglobin 8.6 (*)     Hematocrit 26.7 (*)     .1 (*)     MCH 34.8 (*)     All other components within normal limits   ELECTROLYTES PLUS - Abnormal; Notable for the following components:    POC Sodium 152 (*)     POC Chloride 119 (*)     All other components within normal limits   HGB/HCT - Abnormal; Notable for the following components:    POC Hemoglobin 8.3 (*)     POC Hematocrit 25 (*)     All other components within normal limits   ARTERIAL BLOOD GAS, POC - Abnormal; Notable for the following components:    POC pH 7.325 (*)     POC pCO2 51.9 (*)     POC PO2 324.2 (*)     POC O2  (*)     All other components within normal limits   POCT GLUCOSE - Abnormal; Notable for the following components:    POC Glucose 187 (*)     All other components within normal limits   ARTERIAL BLOOD GAS, POC - Abnormal; Notable for the following components:    POC pH 7.345 (*)     POC pCO2 48.3 (*)     POC PO2 74.1 (*)     All other components within normal limits   LACTIC ACID,POINT OF CARE - Abnormal; Notable for the following components:    POC Lactic Acid 0.53 (*)     All other components within normal limits   POCT GLUCOSE - Abnormal; Notable for the following components:    POC Glucose 106 (*)     All other components within normal limits   ARTERIAL BLOOD GAS, POC - Abnormal; Notable for the following components:    POC pH 7.344 (*)     POC PO2 71.8 (*)     POC O2 SAT 93 (*)     All other components within normal limits   POCT GLUCOSE - Abnormal; Notable for the following components:    POC Glucose 124 (*)     All other components within normal limits   ARTERIAL BLOOD GAS, POC - Abnormal; Notable for the following components:    POC pH 7.280 (*)     POC pCO2 53.6 (*)     All other components within normal limits   POCT GLUCOSE - Abnormal; Notable for the following components:    POC Glucose 116 (*)     All other components within normal limits   ARTERIAL BLOOD GAS, POC - Abnormal; Notable for the following components:    POC pH 7.295 (*)     POC pCO2 54.0 (*)     POC PO2 143.4 (*)     POC O2 SAT 99 (*)     All other components within normal limits   LACTIC ACID,POINT OF CARE - Abnormal; Notable for the following components:    POC Lactic Acid 0.55 (*)     All other components within normal limits   POCT GLUCOSE - Abnormal; Notable for the following components:    POC Glucose 142 (*)     All other components within normal limits   ARTERIAL BLOOD GAS, POC - Abnormal; Notable for the following components:    POC PO2 125.0 (*)     POC O2 SAT 99 (*)     All other components within normal limits   CREATININE W/GFR POINT OF CARE - Abnormal; Notable for the following components:    GFR Comment 57 (*)     GFR Non- 47 (*)     All other components within normal limits   POCT GLUCOSE - Abnormal; Notable for the following components:    POC Glucose 135 (*)     All other components within normal limits   COVID-19, RAPID   MRSA DNA PROBE, NASAL   CULTURE, URINE   TROPONIN   URINE DRUG SCREEN   MICROSCOPIC URINALYSIS   MAGNESIUM   FIBRINOGEN   MAGNESIUM   VITAMIN B12 & FOLATE   CALCIUM, IONIC (POC)   HEMOGLOBIN A1C   LIPID PANEL   LACTIC ACID   IMMATURE PLATELET FRACTION   CALCIUM, IONIC (POC)   VITAMIN B1   BLOOD GAS, ARTERIAL   BLOOD GAS, ARTERIAL   BLOOD GAS, ARTERIAL   SODIUM   SODIUM   BLOOD GAS, ARTERIAL   SODIUM   TEG, RAPID CITRATED   BLOOD GAS, ARTERIAL   AMMONIA   LIPASE   PHOSPHORUS   CREATININE W/GFR POINT OF CARE   UREA N (POC)   LACTIC ACID,POINT OF CARE   POC BLOOD GAS AND CHEMISTRY   POC GLUCOSE FINGERSTICK   POC GLUCOSE FINGERSTICK   LACTIC ACID,POINT OF CARE   LACTIC ACID,POINT OF CARE   UREA N (POC)   LACTIC ACID,POINT OF CARE   POCT GLUCOSE   POCT GLUCOSE   POCT GLUCOSE   POCT GLUCOSE   POC BLOOD GAS AND CHEMISTRY   POCT GLUCOSE   POCT GLUCOSE   POCT GLUCOSE   POCT GLUCOSE   POCT GLUCOSE   POCT GLUCOSE   TYPE AND SCREEN   PREPARE PLATELETS   PREPARE PLATELETS       Elena Stewart MD  4/13/22, 7:56 AM

## 2022-04-14 NOTE — PROGRESS NOTES
Neurology Nurse Practitioner Progress Note      INTERVAL HISTORY: This is a 73 y.o.  female admitted 4/13/2022 for TBI. This is a follow-up neurology progress note. The patient was examined and the chart was reviewed. Discussed with the RN. There were no acute events overnight. Pt stays intubated, off of sedation since 07:45 AM (4/14); brainstem reflexes intact, eye-opening on sternal rub but no eye contact or visual tracking, withdrew BLEs on noxious stimuli.  at the bedside. HPI: Rik Tello is a 73 y.o. female with H/O HTN, primary cancer of bone marrow, migraine headaches, paroxysmal SVT, recurrent falls, orthostatic hypotension, who was admitted as a transfer from [facility], ED on 4/13/2022 for TBI. As per medical records  reported that on the night of 4/12, patient had approximately 3 glasses of wine, had an episode of emesis and then she fell from a standing height outdoors. Apparently she struck the back of her head with LOC for <30 minutes. While at [facility] ED patient complained of severe posterior headache and nausea; she was moving all limbs per . No seizure-like activity was reported. CT head showed bilateral subdural hemorrhage and subarachnoid hemorrhage. CT C-spine was negative. Patient was unable to recall the fall. Patient was transferred to [facility] for further work-up. Patient was admitted under trauma service with neurology and neurosurgery consults. Patient was taking aspirin 81 mg daily at home.      ipratropium-albuterol  1 ampule Inhalation 4x daily    magnesium sulfate  2,000 mg IntraVENous Once    insulin lispro  0-18 Units SubCUTAneous Q4H    labetalol  10 mg IntraVENous Once    sodium chloride flush  5-40 mL IntraVENous 2 times per day    polyethylene glycol  17 g Oral Daily    sennosides-docusate sodium  1 tablet Oral BID    acetaminophen  1,000 mg Oral 3 times per day    gabapentin  300 mg Oral 3 times per day    [Held by provider] PARoxetine  20 mg Oral Daily    atorvastatin  10 mg Oral Nightly    [Held by provider] folic acid  1 mg Oral Daily    [Held by provider] amLODIPine  5 mg Per NG tube Daily    levETIRAcetam  500 mg Oral BID    thiamine  500 mg Oral Daily    famotidine (PEPCID) injection  20 mg IntraVENous BID    metoclopramide  10 mg Oral Q6H       No past medical history on file. Past Surgical History:   Procedure Laterality Date    INSERT ARTERIAL LINE  4/13/2022            PHYSICAL EXAM:      Blood pressure (!) 144/42, pulse 64, temperature 100.4 °F (38 °C), temperature source Oral, resp. rate 22, height 5' 6\" (1.676 m), weight 138 lb 13.5 oz (63 kg), SpO2 100 %.       ROS: Unable to perform as patient is intubated        Limited Neurological Examination:  Pt was intubated; off sedation since 07:45 AM (4/14)  Spontaneous eye opening on sternal rub  No attempt at vocalization  Didn't follow any commands   Eyes in primary position; with no constant gaze deviation; no roving EOMs    Pupils - pin-point, reactive (measured by pupillometer by RN)  Corneal reflexes - present  Cough/gag reflex - present  Mild spontaneous movements of b/l feet  Facial grimaces with painful stimuli to BUEs  BLE withdrawal on painful stimuli  DTRs intact all over  R ankle - unsustained clonus              DATA    Lab Results   Component Value Date    WBC 9.2 04/14/2022    RBC 2.57 (L) 04/14/2022    HGB 9.0 (L) 04/14/2022    HCT 28.2 (L) 04/14/2022     (L) 04/14/2022    ALT 6 04/14/2022    AST 12 04/14/2022     (H) 04/14/2022    K 4.1 04/14/2022    MG 2.4 04/13/2022    PHOS 2.9 04/14/2022     (H) 04/14/2022    AMMONIA 32 04/14/2022    CREATININE 1.15 04/14/2022    BUN 16 04/14/2022    CO2 22 04/14/2022    INR 1.1 04/13/2022    [id number] 619 04/13/2022    FOLATE >20.0 04/13/2022    LABA1C 5.2 04/13/2022     Lab Results   Component Value Date    CHOL 87 04/13/2022     Lab Results   Component Value Date    TRIG 49 04/13/2022     Lab Results   Component Value Date    HDL 63 04/13/2022     Lab Results   Component Value Date    LDLCHOLESTEROL 14 04/13/2022     No results found for: LABVLDL, VLDL  Lab Results   Component Value Date    CHOLHDLRATIO 1.4 04/13/2022         DIAGNOSTIC DATA:  CT HEAD (4/13/2022): Subacute prominent b/l anterior frontal & temporal hemorrhagic contusions,   most pronounced at the anterior L frontal lobe with anterior R-cotto midline shift. Trace L convexity & posterior R parafalcine SDH; possible small SDH R tentorial leaflet. Nondisplaced R occipital skull fracture. Overlying scalp hematoma & laceration. REPEAT CT HEAD (4/13/2022):   1. Interval increase in size of extra-axial hematomas over L parietal convexity & layering    across the falx. 2. Redemonstration of band-like hypodensity within R cerebellum, consistent with acute   infarct or nonhemorrhagic contusion. REPEAT CT HEAD (4/14/2022): No significant interval change in IPH & extra-axial blood products. 3 mm of midline shift from L-to-R again demonstrated. MRI BRAIN (4/13/2022):   1. R posteroinferior cerebellar stroke. 2. Multifocal areas of intracranial hemorrhage, largest in L frontal lobe, 5.1 x 6.3 cm, with    8 mm L-to-R midline shift & associated subfalcine & L-sided uncal herniation. 3. Mild-to-moderate SAH & small SDH, greatest on L       CTA HEAD & NECK (4/13/2022):   L subclavian artery: 25% stenosis. L cervical ICA, mid segment: mild bleeding, likely reflecting fibromuscular dysplasia      ECHO (4/14/2022): Result pending                              IMPRESSION: 73 y.o.  female admitted with  Post-fall SDH, SAH, R occipital skull fracture & b/l contusions, with LOC <30 minutes.  Pt stays intubated, off of sedation since 07:45 AM (4/14); brainstem reflexes intact, eye-opening on sternal rub but no eye contact or visual tracking, withdrew BLEs on noxious stimuli    Repeat CT head (4/14) - 3 mm L-to-R midline shift; no

## 2022-04-14 NOTE — PROGRESS NOTES
Comprehensive Nutrition Assessment    Type and Reason for Visit:  Reassess,Consult (TF recommendations only - Provider to manage)    Nutrition Recommendations/Plan: Continue TF as tolerated; suggest goal of 45 mL/hr (now that propofol is off) to provide 1620 kcal and 101 g pro/day. Will continue to monitor TF tolerance/adequacy. Nutrition Assessment:  Chart reviewed. Pt remains on vent. Immune Enhancing TF started at 30 mL/hr yesterday afternoon. Noted TF advanced to 40 mL/hr this morning. Last BM: 4/12/22. Meds/Labs reviewed.     Malnutrition Assessment:  Malnutrition Status:  Insufficient data    Context:  Acute Illness     Findings of the 6 clinical characteristics of malnutrition:  Energy Intake:  No significant decrease in energy intake  Weight Loss:  No significant weight loss     Body Fat Loss:  Unable to assess     Muscle Mass Loss:  Unable to assess    Fluid Accumulation:  1 - Mild Extremities   Strength:  Not Performed    Estimated Daily Nutrient Needs:  Energy (kcal):  1600 kcal/day; Weight Used for Energy Requirements:  Current     Protein (g):  75-95 g pro/day; Weight Used for Protein Requirements:  Current (1.2-1.5)        Fluid (ml/day):  1800 mL/day; Method Used for Fluid Requirements:  ml/Kg (28)      Nutrition Related Findings:  meds/labs reviewed      Wounds:  None       Current Nutrition Therapies:    Diet NPO Exceptions are: Sips of Water with Meds  ADULT TUBE FEEDING; Nasogastric; Immune Enhancing; Continuous; 30; No; 30; Q 4 hours  Current Tube Feeding (TF) Orders:  · Feeding Route: Orogastric  · Formula: Immune Enhancing  · Schedule: Continuous currently at 40 mL/hr   · Water Flushes: 30 mL every 4 hrs  · Current TF & Flush Orders Provides: 40 mL/hr= 1440 kcal and 90 g pro/day  · Goal TF & Flush Orders Provides: 45 mL/hr= 1620 kcal and 101 g pro/day    Additional Calorie Sources:   none    Anthropometric Measures:  · Height: 5' 6\" (167.6 cm)  · Current Body Weight: 138 lb 13.5 oz (63 kg)   · Ideal Body Weight: 130 lbs; % Ideal Body Weight 106.8 %   · BMI: 22.4  · BMI Categories: Normal Weight (BMI 18.5-24. 9)       Nutrition Diagnosis:   · Inadequate oral intake related to acute injury/trauma,impaired respiratory function as evidenced by NPO or clear liquid status due to medical condition,nutrition support - enteral nutrition    Nutrition Interventions:   Food and/or Nutrient Delivery:   Continue TF as tolerated; suggest TF goal of 45 mL/hr (now that propofol is off) to provide 1620 kcal and 101 g pro/day. Nutrition Education/Counseling:  No recommendation at this time   Coordination of Nutrition Care:  Continue to monitor while inpatient    Goals:  meet % of estimated nutrient needs -goal achieved       Nutrition Monitoring and Evaluation:   Behavioral-Environmental Outcomes:  None Identified   Food/Nutrient Intake Outcomes:  Enteral Nutrition Intake/Tolerance  Physical Signs/Symptoms Outcomes:  Biochemical Data,Fluid Status or Edema,Nutrition Focused Physical Findings,Skin,Weight     Discharge Planning:     Too soon to determine     Electronically signed by Elan Flores RD, LD on 4/14/22 at 11:22 AM EDT    Contact: [phone]

## 2022-04-14 NOTE — PROGRESS NOTES
Neurosurgery MASTER/Resident    Daily Progress Note   Chief Complaint   Patient presents with    Fall     4/14/2022  9:33 AM    Chart reviewed. No acute events overnight. Vitals:    04/14/22 0730 04/14/22 0745 04/14/22 0749 04/14/22 0800   BP:       Pulse: 55 52 (!) 48 50   Resp: 24 24 24 24   Temp:       TempSrc:       SpO2: 100% 100% 100% 98%   Weight:       Height:           PE: Intubated, fentanyl  E2 V1T M5  Does not follow commands  Motor   Localizes to pain bilat UE  Withdraws to pain bilat LE      Lab Results   Component Value Date    WBC 9.2 04/14/2022    HGB 9.0 (L) 04/14/2022    HCT 28.2 (L) 04/14/2022     (L) 04/14/2022    CHOL 87 04/13/2022    TRIG 49 04/13/2022    HDL 63 04/13/2022    ALT 6 04/14/2022    AST 12 04/14/2022     (H) 04/14/2022    K 4.1 04/14/2022     (H) 04/14/2022    CREATININE 1.15 04/14/2022    BUN 16 04/14/2022    CO2 22 04/14/2022    INR 1.1 04/13/2022    LABA1C 5.2 04/13/2022       Radiology   CT HEAD WO CONTRAST    Result Date: 4/14/2022  EXAMINATION: CT OF THE HEAD WITHOUT CONTRAST  4/14/2022 5:35 am TECHNIQUE: CT of the head was performed without the administration of intravenous contrast. Dose modulation, iterative reconstruction, and/or weight based adjustment of the mA/kV was utilized to reduce the radiation dose to as low as reasonably achievable. COMPARISON: 04/13/2022 HISTORY: ORDERING SYSTEM PROVIDED HISTORY: Monitor intracranial hemmorhage TECHNOLOGIST PROVIDED HISTORY: Monitor intracranial hemmorhage Reason for Exam: Monitor intracranial hemmorhage FINDINGS: BRAIN/VENTRICLES: Frontal and parietal intraparenchymal hemorrhages are again demonstrated and without significant change. Small amount of subarachnoid hemorrhage bilaterally again demonstrated. The subdural blood products along the posterior vertex and falx are without appreciable change. Approximate 3 mm of midline shift from left to right again demonstrated.   No new findings identified in the interval. ORBITS: The visualized portion of the orbits demonstrate no acute abnormality. SINUSES: The visualized paranasal sinuses and mastoid air cells demonstrate no acute abnormality. SOFT TISSUES/SKULL:  Right posterior scalp swelling again demonstrated. Partially visualized endotracheal and enteric tubes. No significant interval change in intraparenchymal and extra-axial blood products. 3 mm of midline shift from left to right again demonstrated. RECOMMENDATIONS: Unavailable     MRI BRAIN WO CONTRAST    Result Date: 4/13/2022  EXAMINATION: MRI OF THE BRAIN WITHOUT CONTRAST  4/13/2022 5:06 pm TECHNIQUE: Multiplanar multisequence MRI of the brain was performed without the administration of intravenous contrast. COMPARISON: CT head 04/13/2022 at 1018 hours HISTORY: ORDERING SYSTEM PROVIDED HISTORY: SAH w b/l SDH TECHNOLOGIST PROVIDED HISTORY: SAH w b/l SDH Reason for Exam: SAH w b/l SDH FINDINGS: INTRACRANIAL STRUCTURES/VENTRICLES: There is a right cerebellar focus of restricted diffusion inferiorly consistent with acute subacute stroke. Otherwise, there is evidence of diffusion hyperintensity corresponding to scattered areas of subarachnoid and intracranial hemorrhage. There is scattered foci of hemorrhagic contusion identified and subarachnoid hemorrhage. Hemorrhagic contusion identified greatest involving left frontal lobe with left frontal lobe hemorrhagic contusion measuring 5.1 x 6.3 cm in size. There is evidence of intraparenchymal contusions to lesser extent involving right anterior frontal lobe both anterior temporal lobes noted as well. There is mild-to-moderate diffuse incomplete  FLAIR suppression identified compatible with subarachnoid hemorrhage. There is evidence of right parafalcine subdural hematoma versus subarachnoid hemorrhage layering as well. Left-sided frontal and parietal subdural collections 2-3 mm in size.   There is evidence of left-to-right midline shift measuring 8 mm with minimal parafalcine herniation identified corresponding to the midline shift. Blood products identified within the interpeduncular cistern. Layering blood fluid level involving the left frontal contusion identified. Layering blood products identified involving the left frontal/parietal hemorrhage as well. Major intracranial vascular flow voids are grossly preserved. There is left uncal herniation with left perimesencephalic cistern effacement. Otherwise mild involutional change. Minor chronic microvascular disease. ORBITS: Cataract surgery. SINUSES: Mild paranasal sinus mucosal thickening. No paranasal sinus air-fluid levels. Mastoids are grossly clear. Trace mastoid effusions. BONES/SOFT TISSUES: Right posterior parietal and occipital soft tissue swelling and hematoma noted. Right posteroinferior cerebellar stroke. Multifocal areas of intracranial hemorrhage largest involving the left frontal lobe measuring 5.1 x 6.3 cm in size with left-to-right midline shift measuring 8 mm and associated subfalcine herniation and left-sided uncal herniation. Otherwise mild-to-moderate subarachnoid hemorrhage and small subdural collections greatest on left as noted above. Critical results were called by Dr. Trinity Sanz to [facility]  on 4/13/2022 at [address] hours       A/P  73 y.o. female who presents with fall, bifrontal hemorrhagic contusions, 4mm MLS, right occipital skull fracture                  - No neurosurgical interventions needed at this time  - Hold all antiplatelets and anticoagulants - SCD for DVT prophylaxis   - Na goal 150  - monitor for alcohol withdrawal  - continue Keppra for seizure prophylaxis   - maintain Normotensive SBP       Please contact neurosurgery with any changes in patients neurologic status.        Amy Villa CNP  4/14/22  9:33 AM

## 2022-04-14 NOTE — PROGRESS NOTES
Alcohol Screening and Brief Intervention        Recent Labs     04/13/22  0030   ALC 23*     I have not interviewed Benito Cordova, [id number] regarding  Her alcohol consumption/drug use and risk for excessive use due to her unconscious state and intubated status. Screening deferred at this time.    Deferred [x]    Completed on: 4/14/2022   Bryanna Holbrook RN

## 2022-04-15 NOTE — PROGRESS NOTES
ICU PROGRESS NOTE    PATIENT NAME: Sal Madsen  MEDICAL RECORD NO. [id number]  DATE: 4/15/2022    PRIMARY CARE PHYSICIAN: Reji Campbell MD    HD: # 2    ASSESSMENT    Patient Active Problem List   Diagnosis    [facility]oma) [facility] [place] [address]    Fall from standing    Contusion of frontal lobe with loss of consciousness of 30 minutes or less, initial encounter ([facility] [place] [address]    Traumatic subarachnoid hemorrhage with loss of consciousness of 30 minutes or less (HCC)    Cerebellar stroke ([facility] [place] [address]    Closed head injury       MEDICAL DECISION MAKING AND PLAN    1. Neuro:  - Worsening neuro exam, neurosurgery may plan for EVD if mentation does not improve with decrease in CO2  - Sedation off since 5am due to hypotension, no sedation requirements at this time  - Initial CTH showing b/l SDH and SAH, right occipital skull fx, progressively worsening CT imaging, neurosurgery aware  - MRI brain showing cerebellar stroke, with worsening bleed and concerning for uncal herniation  - Keppra 500mg q12  - 3% NS 20 ml/hr    2. CV  - HR   - MAP 62-93, alternating between levophed, vasopressin and cardene gtt  - SBP goal <140 per nsg  - LA 3.22 (1.8)  - On home asa, holding all chemical AP/AC  - Concern for septic shock from cxr showing possible pneumonia  - Procalcitonin pending, 2L 0.9 NS given overnight    3. Pulm  - Intubated PRVC 30/470/14/100%  - ABG 6.9/100/53/20, pending repeat ABG after increasing RR from previous gas  - 2 amps bicarb given  - PF ratio 52  - SpO2 dropped to high 80% overnight, concern for PE  - CXR showing worsening bibasilar infiltrates today   - Smoker, no home treatments/home O2    4. GI/Nutrition  - Diet tube feeds 30 cc/hr  - Reglan  - Ppx: pepcid, sennakot  - Ammonia 32  - Lipase 12    5.  Renal/lytes   - BUN/Cr 29/1.21, elevating concern for ROBBIE  - Na/K 159/4.3, goal Na 145-150  - Mg/P pending today   - 3% NS 20 ml/hr  - 0.9  ml/hr  - I/O 4199/1193 in last 24h, 0.5 cc/kg/hr  - 4L pos    6. Heme  - Hb 9.4 (9.0)  - Plt 168 (127)  - Holding all chemical AC/AP  - 1u plt given    -    - INR/PTT 1.1/11.5    7. Endocrine        - -158        - Insulin sliding scale high dose        - Consider insulin gtt due to critical condition    7. Micro  - Tmax 39.4, quattro placed for cooling  - WBC 1.0 (9.2), pending repeat   - Procal 5.25 today, will trend   - Zosyn and vancomycin   - CXR showing worsening bibasilar infiltrates today  - Concern for septic shock    8. Family/dispo  - Remain in ICU  - Family discussion for code status, likely palliation  - Palliative care on board    9. Lines  - R IJ CVC  - L fem quattro   - R fem art line  - Greer  - NG     CHECKLIST    CAM-ICU RASS: -4  RESTRAINTS: b/l wrist soft  IVF: 0.9  ml/hr, 3 NS 20 ml/jr  NUTRITION: tube feeds  ANTIBIOTICS: zosyn, vanc  GI: pepcid  DVT: held  GLYCEMIC CONTROL: high dose ISS  HOB >45: yes  MOBILITY: bedrest  SBT: held  IS: n/a    SUBJECTIVE    Gaeliel Fly had high fevers overnight, requiring a quattro catheter for cooling. Patient has also been fluctuating between hypotension and hypertension requiring both cardene gtt and pressors. This morning patient has had difficulties ventilating as demonstrated by her ABG, despite ventilator manipulation. Will continue to monitor closely.      OBJECTIVE  VITALS: Temp: Temp: 103 °F (39.4 °C)Temp  Av.6 °F (38.7 °C)  Min: 99.9 °F (37.7 °C)  Max: 103.2 °F (69.6 °C) BP Systolic (79ULO), TRJ:268 , Min:95 , CGC:531   Diastolic (80JMX), VTI:75, Min:52, Max:62   Pulse Pulse  Av.1  Min: 56  Max: 152 Resp Resp  Av.9  Min: 10  Max: 31 Pulse ox SpO2  Av.6 %  Min: 85 %  Max: 100 %    CONSTITUTIONAL: intubated, sedated, not arousable, does not follow commands  HEENT: pupils equal and reactive  LUNGS: clear bilaterally in supine position  CV: HRRR, tachycardic  GI: abdomen soft   MUSCULOSKELETAL: intact  NEUROLOGIC: GCS 3T, does not move to pain  SKIN: intact    LAB:  CBC:   Recent Labs     04/13/22  2323 04/14/22  0532 04/15/22  0557   WBC 7.2 9.2 1.0*   HGB 8.6* 9.0* 9.4*   HCT 26.7* 28.2* 31.6*   .1* 109.7* 118.4*   PLT See Reflexed IPF Result 127* 168     BMP:   Recent Labs     04/13/22  1331 04/13/22  1941 04/14/22  0532 04/14/22  0746 04/14/22  1902 04/14/22  1908 04/14/22  2204 04/15/22  0205 04/15/22  0557 04/15/22  0603   *   < > 146*   < >  --    < > 154* 155* 159*  --    K 4.2  --  4.1  --   --   --   --   --  4.3  --    *  --  116*  --   --   --   --   --  133*  --    CO2 25  --  22  --   --   --   --   --  17*  --    BUN 11  --  16  --   --   --   --   --  29*  --    CREATININE 0.64  --  0.83   < > 1.10  --   --   --  1.21* 1.33*   GLUCOSE 93  --  143*  --   --   --   --   --  129*  --     < > = values in this interval not displayed. RADIOLOGY:  CXR: 4/15/22    Impression   Worsening bibasilar infiltrates compared to yesterday's exam.       Unchanged support tubes and lines.       Calcified granulomas noted bilaterally.        Shantell Tatum MD  4/15/22, 8:47 AM

## 2022-04-15 NOTE — ACP (ADVANCE CARE PLANNING)
Advance Care Planning     Advance Care Planning (ACP) Physician/NP/PA (Provider) Conversation      Date of ACP Conversation: 4/13/2022    Conversation Conducted with: Patient does not have decision-making capacity      Health Care Decision Maker:  Patient's legal spokesperson is patient's  Suellen Ojeda as per [place]      Care Preferences:    Hospitalization: \"If your health worsens and it becomes clear that your chance of recovery is unlikely, what would your preference be regarding hospitalization? \"  Currently hospitalized    Ventilation: \"If you were in your present state of health and suddenly became very ill and were unable to breathe on your own, what would your preference be about the use of a ventilator (breathing machine) if it was available to you? \"    Full code    \"If your health worsens and it becomes clear that your chance of recovery is unlikely, what would your preference be about the use of a ventilator (breathing machine) if it was available to you? \"   Full code    Resuscitation:  \"CPR works best to restart the heart when there is a sudden event, like a heart attack, in someone who is otherwise healthy. Unfortunately, CPR does not typically restart the heart for people who have serious health conditions or who are very sick. \"    \"In the event your heart stopped as a result of an underlying serious health condition, would you want attempts to be made to restart your heart (answer \"yes\" for attempt to resuscitate) or would you prefer a natural death (answer \"no\" for do not attempt to resuscitate)? \"   Full code.     Conversation Outcomes / Follow-Up Plan:   Patient's legal spokesperson is patient's  Suellen Ojeda ([phone]) as per [facility]    Full code        Length of Voluntary ACP Conversation in minutes:  16 minutes         Pacheco Can MD

## 2022-04-15 NOTE — PROGRESS NOTES
Occupational [address]  Occupational Therapy Not Seen Note    DATE: 4/15/2022    NAME: Anup Hobbs  MRN: [id number]   : 1950      Patient not seen this date for Occupational Therapy due to:    Pt is not medically appropriate for participation in OT services:  Status change to [facility] terminal extubation. OT Services will be Deferred.         Electronically signed by DELIA Velasquez OTR/L on 4/15/2022 at 12:26 PM

## 2022-04-15 NOTE — PLAN OF CARE
Problem: Pain:  Goal: Pain level will decrease  Description: Pain level will decrease  Outcome: Ongoing  Goal: Control of acute pain  Description: Control of acute pain  Outcome: Ongoing     Problem: Falls - Risk of:  Goal: Will remain free from falls  Description: Will remain free from falls  Outcome: Ongoing  Goal: Absence of physical injury  Description: Absence of physical injury  Outcome: Ongoing     Problem: Skin Integrity:  Goal: Will show no infection signs and symptoms  Description: Will show no infection signs and symptoms  Outcome: Ongoing  Goal: Absence of new skin breakdown  Description: Absence of new skin breakdown  Outcome: Ongoing     Problem: Injury - Risk of, Physical Injury:  Goal: Will remain free from falls  Description: Will remain free from falls  Outcome: Ongoing  Goal: Absence of physical injury  Description: Absence of physical injury  Outcome: Ongoing   Round on patient hourly. Repopsition patient every 2 hours. Maintain clean and dry skin.   Merline Cinnamon, RN

## 2022-04-15 NOTE — FLOWSHEET NOTE
[facility] [address]   Patient Death Note  DEATH   Shift date: 04/15/2022     Shift day: Friday  Shift #: 1                 Room # 8591/7625-93     Name: Chichi Nielson            Age: 73 y.o. Gender: female          Mu-ism: None        Place of [facility]: None  Admit Date & Time: 2022 12:10 AM       Referral: Unit Referral    Actual date of death: 04/15/2022 TOD:  11:10AM       SITUATION AT DEATH:  Patient was admitted to ICU (room 172) a couple of days before she was pronounced. IS THIS A 'S CASE? Yes    SPIRITUAL/EMOTIONAL INTERVENTION:  Family declined spiritual care    Family Received Grief Packet? No. Grief Packet to be mailed to family. NAME AND PHONE NUMBER OF DOCTOR SIGNING DEATH CERTIFICATE:  [phone] are now contacting the  home after a patient death.  spoke to/left message for ( home indicating family's choice for their services.  called  home on (date) at (time). Copy of COMPLETED Release of Body Form Received? Yes    Patient's belongings: This  did not handle patient's belongings.  HOME:  Name: Maulik Bound: [place]  Phone Number: [phone]    NEXT OF KIN:  Name: Oneal Basilio  Relationship: Spouse  Street Address: [address] [place] [address] Way: [place]  State: [place]  Zip code: [id number]  Phone Number: [phone] [facility]? No    Electronically signed by Fr. Jose Richards on 4/15/2022 at 12:39 PM.  [facility]  [phone]       04/15/22 1209   Encounter Summary   Services provided to: Patient and family together   Support System Family members   Place of Mandaeism None   Continue Visiting   (04/15/2022)   Complexity of Encounter High   Crisis   Outcome Refused/declined

## 2022-04-15 NOTE — PROGRESS NOTES
Ms. Shravan Yeung' code status has been changed to [facility] with terminal extubation. We will sign off at this time. Please, call with any questions or concerns.  Thank you

## 2022-04-15 NOTE — DISCHARGE SUMMARY
DISCHARGE SUMMARY:    PATIENT NAME:  Myrtha Eisenmenger  YOB: 1950  MEDICAL RECORD NO. [id number]  DATE: 04/15/22  PRIMARY CARE PHYSICIAN: Clarissa Vázquez MD  ADMIT DATE:  2022    DISCHARGE DATE:    DISPOSITION:   at 11:10AM  ADMITTING DIAGNOSIS:   Bilateral SDH with SAH    DIAGNOSIS:   Patient Active Problem List   Diagnosis    [facility]    Fall from standing    Contusion of frontal lobe with loss of consciousness of 30 minutes or less, initial encounter ([facility] [address] [facility]    Traumatic subarachnoid hemorrhage with loss of consciousness of 30 minutes or less ([facility] [address] [facility]    Cerebellar stroke ([facility] [address] [facility]    Closed head injury     CONSULTANTS:  Neurosurgery, palliative care    HOSPITAL COURSE:   Myrtha Eisenmenger is a 73 y.o. female who was admitted on 2022  Hospital Course:    : repeat CTH worse, intubated, 1u plt. 3% gtt. started TFs, levo and cardene alternating  : MRI cerebellar stroke, CT head, Trach/PEG on Monday based on family discussion. Levophed, 3% 40cc/hr    4/15: Patient deteriorated overnight. Code status changed to [facility] and withdrawal of care    Labs and imaging were followed daily. Discharged to  at 11:10AM    PHYSICAL EXAMINATION:        Discharge Vitals:  height is 5' 6\" (1.676 m) and weight is 149 lb 0.5 oz (67.6 kg). Her oral temperature is 103 °F (39.4 °C). Her blood pressure is 95/57 (abnormal) and her pulse is 152. Her respiration is 31 (abnormal) and oxygen saturation is 85% (abnormal).      LABS:     Recent Labs     22  1331 22  1941 22  2323 22  0228 22  0532 22  0746 22  1902 22  1908 04/15/22  0205 04/15/22  0557 04/15/22  0603 04/15/22  0918 04/15/22  0919   WBC  --   --  7.2   < > 9.2  --   --   --   --  1.0*  --   --  1.1*   HGB  --   --  8.6*  --  9.0*  --   --   --   --  9.4*  --   --   --    HCT  --   --  26.7*  --  28.2*  --   --   --   --  31.6*  --   --   --    PLT  --   --  See Reflexed IPF Result  --  127*  --   --   --   --  168  --   --   --    *   < >  --    < > 146*   < >  --    < > 155* 159*  --  158*  --    K 4.2  --   --   --  4.1  --   --   --   --  4.3  --   --   --    *  --   --   --  116*  --   --   --   --  133*  --   --   --    CO2 25  --   --   --  22  --   --   --   --  17*  --   --   --    BUN 11  --   --   --  16  --   --   --   --  29*  --   --   --    CREATININE 0.64  --   --   --  0.83   < > 1.10  --   --  1.21* 1.33*  --   --     < > = values in this interval not displayed. DIAGNOSTIC TESTS:    ECHO Complete 2D W Doppler W Color    Result Date: 4/14/2022  Transthoracic Echocardiography Report (TTE)  Patient Name Sasha Anand      Date of Study               04/14/2022               [facility]   Date of      1950  Gender                      Female  Birth   Age          73 year(s)  Race                           Room Number  6062        Height:                     66 inch, 167.64 cm   Corporate ID [id number]   Weight:                     138 pounds, 62.6 kg  #   Patient Acct [de-identified]   BSA:          1.71 m^2      BMI:     22.27 kg/m^2  #   MR #         [id number]     Sonographer                 Dar Tommy   Accession #  [id number]  Interpreting Physician      [address] [place] [address]   Fellow                   Referring Nurse                           Practitioner   Interpreting             Referring Physician         Hardeep Silverman MD  Fellow  Additional Comments Unable to perform bubble study due to RN refusing Type of Study   TTE procedure:2D Echocardiogram, M-Mode, Doppler, Color Doppler. Procedure Date Date: 04/14/2022 Start: 02:48 PM Study Location: [facility] Technical Quality: Fair visualization Indications:Subarachnoid hemorrhage. History / Tech. Comments: Procedure explained to patient. Study done at the bedside.  Subdural hematoma, fall Patient Status: Inpatient Height: 66 inches Weight: 138 pounds BSA: 1.71 m^2 BMI: 22.27 kg/m^2 CONCLUSIONS Summary Left ventricle is normal in size. Global left ventricular systolic function is normal. Estimated ejection fraction is 55-60 % . Mild to moderate left ventricular hypertrophy. Trivial tricuspid regurgitation. Signature ----------------------------------------------------------------------------  Electronically signed by Parul Castillo(Sonographer) on 04/14/2022  04:08 PM ---------------------------------------------------------------------------- ----------------------------------------------------------------------------  Electronically signed by Robles Mayer(Interpreting physician) on 04/14/2022  06:15 PM ---------------------------------------------------------------------------- FINDINGS Left Atrium Left atrium is normal in size. Left Ventricle Left ventricle is normal in size. Global left ventricular systolic function is normal. Estimated ejection fraction is 55-60 % . Mild to moderate left ventricular hypertrophy. No obvious wall motion abnormality seen. Right Atrium Right atrium is normal in size. Right Ventricle Normal right ventricular size and function. Mitral Valve Normal mitral valve structure. No significant mitral regurgitation. No mitral stenosis. Aortic Valve Aortic valve structure and function normal. Aortic valve is trileaflet. No aortic insufficiency. No aortic stenosis. Tricuspid Valve Normal tricuspid valve leaflets. Trivial tricuspid regurgitation. No tricuspid stenosis. Insignificant tricuspid regurgitation, unable to estimate RVSP. Pulmonic Valve Pulmonic valve is normal in structure and function. No pulmonic insufficiency. No evidence of pulmonic stenosis. Pericardial Effusion No significant pericardial effusion is seen. Miscellaneous Normal aortic root dimension. E/E' average = 22.35. IVC dilated but unable to assess respiratory collapse due to patient on ventilator.  M-mode / 2D Measurements & Calculations:   LVIDd:3.8 cm(3.7 - 5.6 cm)        Diastolic LKGEWZ:73.9 ml LVIDs: 1.6 cm(2.2 - 4.0 cm)        Systolic QWMJCJ:4.3 ml  CHELSEA:6.6 cm(0.6 - 1.1 cm)         Aortic Root:3.1 cm(2.0 - 3.7 cm)  LVPWd:1.4 cm(0.6 - 1.1 cm)        LA Dimension: 3 cm(1.9 - 4.0 cm)  Fractional Shortenin.89 %     LA volume/Index: 26.6 ml /16m^2  Calculated LVEF (%): 92.53 %      LVOT:2.2 cm                                    RVDd:2.4 cm   Mitral:                                 Aortic   Valve Area (P1/2-Time): 2.1 cm^2        Peak Velocity: 1.98 m/s  Peak E-Wave: 1.13 m/s                   Mean Velocity: 1.34 m/s  Peak A-Wave: 1.28 m/s                   Peak Gradient: 15.68 mmHg  E/A Ratio: 0.88                         Mean Gradient: 9 mmHg  Peak Gradient: 5.11 mmHg  Mean Gradient: 3 mmHg  Deceleration Time: 351 msec             Area (continuity): 2.84 cm^2  P1/2t: 105 msec                         AV VTI: 40.5 cm   Area (continuity): 2.66 cm^2  Mean Velocity: 0.79 m/s                                           Pulmonic:                                           Peak Velocity: 1.74 m/s                                          Peak Gradient: 12.11 mmHg  Diastology / Tissue Doppler Septal Wall E' velocity:0.05 m/s Septal Wall E/E':21.6 Lateral Wall E' velocity:0.05 m/s Lateral Wall E/E':23.1    CT HEAD WO CONTRAST    Result Date: 2022  EXAMINATION: CT OF THE HEAD WITHOUT CONTRAST  2022 5:35 am TECHNIQUE: CT of the head was performed without the administration of intravenous contrast. Dose modulation, iterative reconstruction, and/or weight based adjustment of the mA/kV was utilized to reduce the radiation dose to as low as reasonably achievable. COMPARISON: 2022 HISTORY: ORDERING SYSTEM PROVIDED HISTORY: Monitor intracranial hemmorhage TECHNOLOGIST PROVIDED HISTORY: Monitor intracranial hemmorhage Reason for Exam: Monitor intracranial hemmorhage FINDINGS: BRAIN/VENTRICLES: Frontal and parietal intraparenchymal hemorrhages are again demonstrated and without significant change.   Small amount of subarachnoid hemorrhage bilaterally again demonstrated. The subdural blood products along the posterior vertex and falx are without appreciable change. Approximate 3 mm of midline shift from left to right again demonstrated. No new findings identified in the interval. ORBITS: The visualized portion of the orbits demonstrate no acute abnormality. SINUSES: The visualized paranasal sinuses and mastoid air cells demonstrate no acute abnormality. SOFT TISSUES/SKULL:  Right posterior scalp swelling again demonstrated. Partially visualized endotracheal and enteric tubes. No significant interval change in intraparenchymal and extra-axial blood products. 3 mm of midline shift from left to right again demonstrated. RECOMMENDATIONS: Unavailable     CT HEAD WO CONTRAST    Result Date: 4/13/2022  EXAMINATION: CT OF THE HEAD WITHOUT CONTRAST  4/13/2022 10:13 am TECHNIQUE: CT of the head was performed without the administration of intravenous contrast. Dose modulation, iterative reconstruction, and/or weight based adjustment of the mA/kV was utilized to reduce the radiation dose to as low as reasonably achievable. COMPARISON: None. HISTORY: ORDERING SYSTEM PROVIDED HISTORY: follow up worsen contusions TECHNOLOGIST PROVIDED HISTORY: follow up worsen contusions Reason for Exam: follow up from fall,contusion FINDINGS: There are stable hemorrhagic contusions within both frontal lobes and both temporal lobes, with a hematocrit level in the left frontal lobe and a stable degree left-to-right midline shift. There is mild increase in subdural blood layering over the falx. There is also in increase in size of an extra-axial hematoma over the left parietal convexity measuring up to 9.4 mm in AP diameter, previously 5.6 mm. There is redemonstration of band like hypodensity within the right occipital lobe, which could reflect an infarct or nonhemorrhagic contusion. This was present on the previous exam upon retrospect.  A nondisplaced right suboccipital bone fracture is again seen, with overlying soft tissue swelling and a scalp hematoma. Interval increase in size of extra-axial hematomas over the left parietal convexity and layering across the falx. Redemonstration of band-like hypodensity within the cerebellum, on the right-hand side, consistent with acute infarct or nonhemorrhagic contusion. Otherwise significant change from previous exam obtained 4:21 a.m. The findings were sent to the Radiology Results [address] at 11:30 am on 4/13/2022to be communicated to a licensed caregiver. CT HEAD WO CONTRAST    Result Date: 4/13/2022  EXAMINATION: CT OF THE HEAD WITHOUT CONTRAST  4/13/2022 3:31 am TECHNIQUE: CT of the head was performed without the administration of intravenous contrast. Dose modulation, iterative reconstruction, and/or weight based adjustment of the mA/kV was utilized to reduce the radiation dose to as low as reasonably achievable. COMPARISON: None available despite provided history. HISTORY: ORDERING SYSTEM PROVIDED HISTORY: follow up on SAH/SDH TECHNOLOGIST PROVIDED HISTORY: follow up on SAH/SDH Reason for Exam: follow up on SAH/SDH FINDINGS: BRAIN/VENTRICLES: Hemorrhagic contusions at the bilateral anterior frontal and temporal lobes are noted most pronounced at the anterior left frontal lobe. Quite minimal left convexity and posterior right parafalcine subdural hematomas are present. Possible minor subdural hematoma at right tentorial leaflet. There appears to be approximately 4 mm midline shift anteriorly due to the large anterior left frontal hemorrhagic contusion. There is also partial effacement of the left lateral ventricle. ORBITS: The visualized portion of the orbits demonstrate no acute abnormality. SINUSES: The visualized paranasal sinuses and mastoid air cells demonstrate no acute abnormality. SOFT TISSUES/SKULL:  There is a nondisplaced right occipital skull fracture.  Mild scalp hematoma and laceration are present posterior on the right. The comparison study is not currently available. Subacute appearing prominent bilateral anterior frontal and temporal hemorrhagic contusions are noted most pronounced at the anterior left frontal lobe with resulting mild rightward shift of the midline anteriorly. Trace left convexity and posterior right parafalcine subdural hematomas and possible minimal subdural hematoma at the right tentorial leaflet. Nondisplaced right occipital skull fracture. Overlying scalp hematoma and laceration. XR CHEST PORTABLE    Result Date: 4/14/2022  EXAMINATION: ONE XRAY VIEW OF THE CHEST 4/14/2022 4:46 pm COMPARISON: 04/14/2022 5:17 a.m. HISTORY: ORDERING SYSTEM PROVIDED HISTORY: desaturation TECHNOLOGIST PROVIDED HISTORY: desaturation FINDINGS: As compared to prior examination, there is some increasing infiltration in the the right lung base. There is some increasing atelectasis in the medial aspect of left lower lobe. There are bilateral calcified granulomas. ET tube, NG tube and central line are in good position. The heart and mediastinal structures are unremarkable. Increasing infiltration in the the right lung base. Some increasing atelectasis in the medial aspect of the left lower lobe. XR CHEST PORTABLE    Result Date: 4/14/2022  EXAMINATION: ONE XRAY VIEW OF THE CHEST 4/14/2022 5:55 am COMPARISON: 04/13/2022 HISTORY: ORDERING SYSTEM PROVIDED HISTORY: Respiratory failure TECHNOLOGIST PROVIDED HISTORY: Respiratory failure Reason for Exam: supine port FINDINGS: Unchanged support tubes and lines. Heart size and pulmonary vasculature are normal.  Minor interstitial prominence is present at right lower lung zone and there is blunting of the right lateral costophrenic sulcus. The lungs otherwise are noted for scattered calcified granulomas. Surrounding osseous and soft tissue structures show no acute or concerning abnormality.      Stable tubes and lines. Mild interstitial prominence at the right lower lung zone, possibly chronic. Possible tiny right pleural effusion. Evidence of old granulomatous disease. XR CHEST PORTABLE    Result Date: 4/13/2022  EXAMINATION: ONE XRAY VIEW OF THE CHEST 4/13/2022 7:26 am COMPARISON: 04/13/2022 HISTORY: ORDERING SYSTEM PROVIDED HISTORY: intubation TECHNOLOGIST PROVIDED HISTORY: intubation FINDINGS: An endotracheal tube, enteric tube and right internal jugular central venous catheter stable in appearance. The mediastinal and cardiac contours are stable. There are persistent but improving bibasilar opacities. There are persistent bibasilar opacities suggestive of atelectasis. There is no new focal consolidation or pneumothorax. 1. Stable position of support lines and tubes. 2. Persistent bibasilar opacities suggestive of atelectasis. XR CHEST PORTABLE    Result Date: 4/13/2022  EXAMINATION: ONE XRAY VIEW OF THE CHEST 4/13/2022 6:11 am COMPARISON: CT chest dated 04/13/2022 HISTORY: ORDERING SYSTEM PROVIDED HISTORY: line place TECHNOLOGIST PROVIDED HISTORY: line place Reason for Exam: supine port FINDINGS: Patient is rotated. Right internal jugular central venous catheter projects at the thoracic midline. No pneumothorax. Bibasilar heterogeneous opacity is demonstrated. No significant pleural effusion. Cardiac and mediastinal silhouettes are reflective of patient rotation. Rotated exam.  Status post placement of right internal jugular central venous catheter, without gross pneumothorax. Determination of line position is limited given patient rotation. Correlate with catheter function to ensure venous return. Bibasilar atelectasis or airspace disease.      XR ABDOMEN FOR NG/OG/NE TUBE PLACEMENT    Result Date: 4/13/2022  EXAMINATION: ONE SUPINE XRAY VIEW(S) OF THE ABDOMEN 4/13/2022 7:26 am COMPARISON: 04/13/2022 CT chest/abdomen/pelvis HISTORY: ORDERING SYSTEM PROVIDED HISTORY: Confirmation of course of NG/OG/NE tube and location of tip of tube TECHNOLOGIST PROVIDED HISTORY: Confirmation of course of NG/OG/NE tube and location of tip of tube Portable? ->Yes FINDINGS: Limited views of the upper abdomen provided for enteric tube placement. Enteric tube tip and side port overlie the stomach. Nonobstructive bowel gas pattern. No evidence of pneumoperitoneum within the limitations of supine technique. Levoconvex curvature of the lumbar spine and associated degenerative changes. Vascular stent overlies the upper pelvis. Enteric tube tip and side port overlie the stomach. CTA HEAD NECK W CONTRAST    Result Date: 4/13/2022  EXAMINATION: CTA OF THE HEAD AND NECK WITH CONTRAST 4/13/2022 10:13 am: TECHNIQUE: CTA of the head and neck was performed with the administration of intravenous contrast. Multiplanar reformatted images are provided for review. MIP images are provided for review. Stenosis of the internal carotid arteries measured using NASCET criteria. Dose modulation, iterative reconstruction, and/or weight based adjustment of the mA/kV was utilized to reduce the radiation dose to as low as reasonably achievable. COMPARISON: None. HISTORY: ORDERING SYSTEM PROVIDED HISTORY: occipital skull fracture TECHNOLOGIST PROVIDED HISTORY: occipital skull fracture Reason for Exam: occipital skull fx FINDINGS: CTA NECK: AORTIC ARCH/ARCH VESSELS: No dissection or arterial injury. No significant stenosis of the brachiocephalic or subclavian arteries. Atherosclerotic disease results in 25% stenosis of the left subclavian artery. CAROTID ARTERIES: No dissection, arterial injury, or hemodynamically significant stenosis by NASCET criteria. There is mild beading of the mid left cervical ICA, likely reflecting fibromuscular dysplasia (FMD). VERTEBRAL ARTERIES: No dissection, arterial injury, or significant stenosis. SOFT TISSUES: There is pulmonary emphysema. A calcified granuloma is present within the left lung apex.   The patient is intubated. BONES: No acute osseous abnormality. CTA HEAD: ANTERIOR CIRCULATION: No significant stenosis of the intracranial internal carotid, anterior cerebral, or middle cerebral arteries. No aneurysm. Bilateral posterior communicating arteries are present. POSTERIOR CIRCULATION: No significant stenosis of the vertebral, basilar, or posterior cerebral arteries. No aneurysm. OTHER: No dural venous sinus thrombosis on this non-dedicated study. BRAIN: See separately dictated noncontrast head CT report. No acute arterial injury detected within the head or neck. Incidental findings as detailed above. CT CHEST ABDOMEN PELVIS WO CONTRAST    Result Date: 4/15/2022  EXAMINATION: CT OF THE CHEST, ABDOMEN, AND PELVIS WITHOUT CONTRAST; CT OF THE THORACIC SPINE WITHOUT CONTRAST; CT OF THE LUMBAR SPINE WITHOUT CONTRAST 4/13/2022 12:38 am TECHNIQUE: CT of the chest, abdomen and pelvis was performed without the administration of intravenous contrast. Multiplanar reformatted images are provided for review. Dose modulation, iterative reconstruction, and/or weight based adjustment of the mA/kV was utilized to reduce the radiation dose to as low as reasonably achievable.; CT of the thoracic spine was performed without the administration of intravenous contrast. Multiplanar reformatted images are provided for review. Dose modulation, iterative reconstruction, and/or weight based adjustment of the mA/kV was utilized to reduce the radiation dose to as low as reasonably achievable.; CT of the lumbar spine was performed without the administration of intravenous contrast. Multiplanar reformatted images are provided for review. Adjustment of mA and/or kV according to patient size was utilized. Dose modulation, iterative reconstruction, and/or weight based adjustment of the mA/kV was utilized to reduce the radiation dose to as low as reasonably achievable.  COMPARISON: None HISTORY: ORDERING SYSTEM PROVIDED HISTORY: fall SAH/SDH TECHNOLOGIST PROVIDED HISTORY: fall SAH/SDH Decision Support Exception - unselect if not a suspected or confirmed emergency medical condition->Emergency Medical Condition (MA) Reason for Exam: fall SAH/SDH; ORDERING SYSTEM PROVIDED HISTORY: trauma TECHNOLOGIST PROVIDED HISTORY: trauma Reason for Exam: fall SAH/SDH; ORDERING SYSTEM PROVIDED HISTORY: TRAUMA TECHNOLOGIST PROVIDED HISTORY: Trauma trauma Reason for Exam: fall SAH/SDH FINDINGS: Chest: Mediastinum: No mediastinal hematoma. No pericardial effusion. Calcified atheromatous plaque and coronary calcification. The esophagus is mildly patulous. Lungs/pleura: No acute airspace disease, pneumothorax or effusion. Minimal subsegmental atelectasis or scarring in the lung bases. Nonspecific minimal subpleural ground-glass in the anterior right upper lobe. Sequela of old granulomatous disease. Soft Tissues/Bones: No acute osseous abnormality identified in this region. No soft tissue hematoma. Abdomen/Pelvis: Organs: Evaluation of the abdominal and pelvic viscera is limited in the absence of contrast..  No secondary evidence for acute solid organ injury or inflammatory change. GI/Bowel: There is no bowel dilatation or wall thickening identified. Pelvis: No acute findings. Greer catheter decompresses the bladder. Peritoneum/Retroperitoneum: No free air. No free fluid. The aorta is normal in caliber. Bones/Soft Tissues: Pelvic alignment maintained. No fracture identified. Degenerative subchondral cyst formation in the left acetabulum and moderate joint space narrowing in both hips. No soft tissue hematoma. THORACIC: BONES/ALIGNMENT: Nonspecific heterogeneous decreased bone mineralization. There is normal alignment of the spine. The vertebral body heights are maintained. DEGENERATIVE CHANGES: No gross spinal canal stenosis or bony neural foraminal narrowing of the thoracic spine. SOFT TISSUES: No paraspinal hematoma.  LUMBAR: BONES/ALIGNMENT: Nonspecific heterogeneous decreased bone mineralization. Leftward lumbar curvature. The vertebral body heights are maintained. DEGENERATIVE CHANGES: No gross spinal canal stenosis or bony neural foraminal narrowing of the lumbar spine. Multilevel degenerative disc disease and lower lumbar facet arthropathy. SOFT TISSUES: No paraspinal hematoma. 1.  No acute traumatic injury identified in the chest, abdomen or pelvis, within the limits of a noncontrast exam. 2.  No acute osseous abnormality identified in the thoracic or lumbar spine. MRI BRAIN WO CONTRAST    Result Date: 4/13/2022  EXAMINATION: MRI OF THE BRAIN WITHOUT CONTRAST  4/13/2022 5:06 pm TECHNIQUE: Multiplanar multisequence MRI of the brain was performed without the administration of intravenous contrast. COMPARISON: CT head 04/13/2022 at 1018 hours HISTORY: ORDERING SYSTEM PROVIDED HISTORY: SAH w b/l SDH TECHNOLOGIST PROVIDED HISTORY: SAH w b/l SDH Reason for Exam: SAH w b/l SDH FINDINGS: INTRACRANIAL STRUCTURES/VENTRICLES: There is a right cerebellar focus of restricted diffusion inferiorly consistent with acute subacute stroke. Otherwise, there is evidence of diffusion hyperintensity corresponding to scattered areas of subarachnoid and intracranial hemorrhage. There is scattered foci of hemorrhagic contusion identified and subarachnoid hemorrhage. Hemorrhagic contusion identified greatest involving left frontal lobe with left frontal lobe hemorrhagic contusion measuring 5.1 x 6.3 cm in size. There is evidence of intraparenchymal contusions to lesser extent involving right anterior frontal lobe both anterior temporal lobes noted as well. There is mild-to-moderate diffuse incomplete  FLAIR suppression identified compatible with subarachnoid hemorrhage. There is evidence of right parafalcine subdural hematoma versus subarachnoid hemorrhage layering as well. Left-sided frontal and parietal subdural collections 2-3 mm in size.   There is evidence of left-to-right midline shift measuring 8 mm with minimal parafalcine herniation identified corresponding to the midline shift. Blood products identified within the interpeduncular cistern. Layering blood fluid level involving the left frontal contusion identified. Layering blood products identified involving the left frontal/parietal hemorrhage as well. Major intracranial vascular flow voids are grossly preserved. There is left uncal herniation with left perimesencephalic cistern effacement. Otherwise mild involutional change. Minor chronic microvascular disease. ORBITS: Cataract surgery. SINUSES: Mild paranasal sinus mucosal thickening. No paranasal sinus air-fluid levels. Mastoids are grossly clear. Trace mastoid effusions. BONES/SOFT TISSUES: Right posterior parietal and occipital soft tissue swelling and hematoma noted. Right posteroinferior cerebellar stroke. Multifocal areas of intracranial hemorrhage largest involving the left frontal lobe measuring 5.1 x 6.3 cm in size with left-to-right midline shift measuring 8 mm and associated subfalcine herniation and left-sided uncal herniation. Otherwise mild-to-moderate subarachnoid hemorrhage and small subdural collections greatest on left as noted above. Critical results were called by Dr. Mikki Chaidez to [facility]  on 4/13/2022 at 1834 hours     CT LUMBAR SPINE TRAUMA RECONSTRUCTION    Result Date: 4/15/2022  EXAMINATION: CT OF THE CHEST, ABDOMEN, AND PELVIS WITHOUT CONTRAST; CT OF THE THORACIC SPINE WITHOUT CONTRAST; CT OF THE LUMBAR SPINE WITHOUT CONTRAST 4/13/2022 12:38 am TECHNIQUE: CT of the chest, abdomen and pelvis was performed without the administration of intravenous contrast. Multiplanar reformatted images are provided for review.  Dose modulation, iterative reconstruction, and/or weight based adjustment of the mA/kV was utilized to reduce the radiation dose to as low as reasonably achievable.; CT of the thoracic spine was performed without the administration of intravenous contrast. Multiplanar reformatted images are provided for review. Dose modulation, iterative reconstruction, and/or weight based adjustment of the mA/kV was utilized to reduce the radiation dose to as low as reasonably achievable.; CT of the lumbar spine was performed without the administration of intravenous contrast. Multiplanar reformatted images are provided for review. Adjustment of mA and/or kV according to patient size was utilized. Dose modulation, iterative reconstruction, and/or weight based adjustment of the mA/kV was utilized to reduce the radiation dose to as low as reasonably achievable. COMPARISON: None HISTORY: ORDERING SYSTEM PROVIDED HISTORY: fall SAH/SDH TECHNOLOGIST PROVIDED HISTORY: fall SAH/SDH Decision Support Exception - unselect if not a suspected or confirmed emergency medical condition->Emergency Medical Condition (MA) Reason for Exam: fall SAH/SDH; ORDERING SYSTEM PROVIDED HISTORY: trauma TECHNOLOGIST PROVIDED HISTORY: trauma Reason for Exam: fall SAH/SDH; ORDERING SYSTEM PROVIDED HISTORY: TRAUMA TECHNOLOGIST PROVIDED HISTORY: Trauma trauma Reason for Exam: fall SAH/SDH FINDINGS: Chest: Mediastinum: No mediastinal hematoma. No pericardial effusion. Calcified atheromatous plaque and coronary calcification. The esophagus is mildly patulous. Lungs/pleura: No acute airspace disease, pneumothorax or effusion. Minimal subsegmental atelectasis or scarring in the lung bases. Nonspecific minimal subpleural ground-glass in the anterior right upper lobe. Sequela of old granulomatous disease. Soft Tissues/Bones: No acute osseous abnormality identified in this region. No soft tissue hematoma. Abdomen/Pelvis: Organs: Evaluation of the abdominal and pelvic viscera is limited in the absence of contrast..  No secondary evidence for acute solid organ injury or inflammatory change. GI/Bowel: There is no bowel dilatation or wall thickening identified.  Pelvis: No acute findings. Greer catheter decompresses the bladder. Peritoneum/Retroperitoneum: No free air. No free fluid. The aorta is normal in caliber. Bones/Soft Tissues: Pelvic alignment maintained. No fracture identified. Degenerative subchondral cyst formation in the left acetabulum and moderate joint space narrowing in both hips. No soft tissue hematoma. THORACIC: BONES/ALIGNMENT: Nonspecific heterogeneous decreased bone mineralization. There is normal alignment of the spine. The vertebral body heights are maintained. DEGENERATIVE CHANGES: No gross spinal canal stenosis or bony neural foraminal narrowing of the thoracic spine. SOFT TISSUES: No paraspinal hematoma. LUMBAR: BONES/ALIGNMENT: Nonspecific heterogeneous decreased bone mineralization. Leftward lumbar curvature. The vertebral body heights are maintained. DEGENERATIVE CHANGES: No gross spinal canal stenosis or bony neural foraminal narrowing of the lumbar spine. Multilevel degenerative disc disease and lower lumbar facet arthropathy. SOFT TISSUES: No paraspinal hematoma. 1.  No acute traumatic injury identified in the chest, abdomen or pelvis, within the limits of a noncontrast exam. 2.  No acute osseous abnormality identified in the thoracic or lumbar spine. CT THORACIC SPINE TRAUMA RECONSTRUCTION    Result Date: 4/15/2022  EXAMINATION: CT OF THE CHEST, ABDOMEN, AND PELVIS WITHOUT CONTRAST; CT OF THE THORACIC SPINE WITHOUT CONTRAST; CT OF THE LUMBAR SPINE WITHOUT CONTRAST 4/13/2022 12:38 am TECHNIQUE: CT of the chest, abdomen and pelvis was performed without the administration of intravenous contrast. Multiplanar reformatted images are provided for review.  Dose modulation, iterative reconstruction, and/or weight based adjustment of the mA/kV was utilized to reduce the radiation dose to as low as reasonably achievable.; CT of the thoracic spine was performed without the administration of intravenous contrast. Multiplanar reformatted images are provided for review. Dose modulation, iterative reconstruction, and/or weight based adjustment of the mA/kV was utilized to reduce the radiation dose to as low as reasonably achievable.; CT of the lumbar spine was performed without the administration of intravenous contrast. Multiplanar reformatted images are provided for review. Adjustment of mA and/or kV according to patient size was utilized. Dose modulation, iterative reconstruction, and/or weight based adjustment of the mA/kV was utilized to reduce the radiation dose to as low as reasonably achievable. COMPARISON: None HISTORY: ORDERING SYSTEM PROVIDED HISTORY: fall SAH/SDH TECHNOLOGIST PROVIDED HISTORY: fall SAH/SDH Decision Support Exception - unselect if not a suspected or confirmed emergency medical condition->Emergency Medical Condition (MA) Reason for Exam: fall SAH/SDH; ORDERING SYSTEM PROVIDED HISTORY: trauma TECHNOLOGIST PROVIDED HISTORY: trauma Reason for Exam: fall SAH/SDH; ORDERING SYSTEM PROVIDED HISTORY: TRAUMA TECHNOLOGIST PROVIDED HISTORY: Trauma trauma Reason for Exam: fall SAH/SDH FINDINGS: Chest: Mediastinum: No mediastinal hematoma. No pericardial effusion. Calcified atheromatous plaque and coronary calcification. The esophagus is mildly patulous. Lungs/pleura: No acute airspace disease, pneumothorax or effusion. Minimal subsegmental atelectasis or scarring in the lung bases. Nonspecific minimal subpleural ground-glass in the anterior right upper lobe. Sequela of old granulomatous disease. Soft Tissues/Bones: No acute osseous abnormality identified in this region. No soft tissue hematoma. Abdomen/Pelvis: Organs: Evaluation of the abdominal and pelvic viscera is limited in the absence of contrast..  No secondary evidence for acute solid organ injury or inflammatory change. GI/Bowel: There is no bowel dilatation or wall thickening identified. Pelvis: No acute findings. Greer catheter decompresses the bladder. Peritoneum/Retroperitoneum: No free air. No free fluid. The aorta is normal in caliber. Bones/Soft Tissues: Pelvic alignment maintained. No fracture identified. Degenerative subchondral cyst formation in the left acetabulum and moderate joint space narrowing in both hips. No soft tissue hematoma. THORACIC: BONES/ALIGNMENT: Nonspecific heterogeneous decreased bone mineralization. There is normal alignment of the spine. The vertebral body heights are maintained. DEGENERATIVE CHANGES: No gross spinal canal stenosis or bony neural foraminal narrowing of the thoracic spine. SOFT TISSUES: No paraspinal hematoma. LUMBAR: BONES/ALIGNMENT: Nonspecific heterogeneous decreased bone mineralization. Leftward lumbar curvature. The vertebral body heights are maintained. DEGENERATIVE CHANGES: No gross spinal canal stenosis or bony neural foraminal narrowing of the lumbar spine. Multilevel degenerative disc disease and lower lumbar facet arthropathy. SOFT TISSUES: No paraspinal hematoma. 1.  No acute traumatic injury identified in the chest, abdomen or pelvis, within the limits of a noncontrast exam. 2.  No acute osseous abnormality identified in the thoracic or lumbar spine.        DISCHARGE INSTRUCTIONS     Discharge Medications:        Medication List      ASK your doctor about these medications    ascorbic acid 500 MG tablet  Commonly known as: VITAMIN C     aspirin 81 MG chewable tablet     atorvastatin 10 MG tablet  Commonly known as: LIPITOR     calcium carbonate 600 MG Tabs tablet     celecoxib 200 MG capsule  Commonly known as: CELEBREX     dilTIAZem 180 MG extended release capsule  Commonly known as: DILACOR XR     oxybutynin 5 MG tablet  Commonly known as: DITROPAN     PARoxetine 20 MG tablet  Commonly known as: PAXIL     therapeutic multivitamin-minerals tablet     tiZANidine 4 MG tablet  Commonly known as: ZANAFLEX     topiramate 25 MG tablet  Commonly known as: TOPAMAX     vitamin D 125 MCG (5000 UT) Caps capsule  Commonly known as: CHOLECALCIFEROL          DISPOSITION:     SIGNED:  Salome Browning MD   4/15/2022, 10:54 AM  Time Spent for discharge: 35 minutes

## 2022-04-15 NOTE — PLAN OF CARE
Problem: Pain:  Goal: Pain level will decrease  Description: Pain level will decrease  4/15/2022 0833 by Chris Mason RN  Outcome: Ongoing  4/14/2022 2035 by Felicia Rashid RN  Outcome: Ongoing  Goal: Control of acute pain  Description: Control of acute pain  4/15/2022 0833 by Chris Mason RN  Outcome: Ongoing  4/14/2022 2035 by Felicia Rashid RN  Outcome: Ongoing  Goal: Control of chronic pain  Description: Control of chronic pain  Outcome: Ongoing     Problem: Falls - Risk of:  Goal: Will remain free from falls  Description: Will remain free from falls  4/15/2022 0833 by Chris Mason RN  Outcome: Ongoing  4/14/2022 2035 by Felicia Rashid RN  Outcome: Ongoing  Goal: Absence of physical injury  Description: Absence of physical injury  4/15/2022 0833 by Chris Mason RN  Outcome: Ongoing  4/14/2022 2035 by Felicia Rashid RN  Outcome: Ongoing     Problem: Skin Integrity:  Goal: Will show no infection signs and symptoms  Description: Will show no infection signs and symptoms  4/15/2022 0833 by Chris Mason RN  Outcome: Ongoing  4/14/2022 2035 by Felicia Rashid RN  Outcome: Ongoing  Goal: Absence of new skin breakdown  Description: Absence of new skin breakdown  4/15/2022 0833 by Chris Mason RN  Outcome: Ongoing  4/14/2022 2035 by Felicia Rashid RN  Outcome: Ongoing     Problem: Confusion - Acute:  Goal: Absence of continued neurological deterioration signs and symptoms  Description: Absence of continued neurological deterioration signs and symptoms  Outcome: Ongoing  Goal: Mental status will be restored to baseline  Description: Mental status will be restored to baseline  Outcome: Ongoing     Problem: Discharge Planning:  Goal: Ability to perform activities of daily living will improve  Description: Ability to perform activities of daily living will improve  Outcome: Ongoing  Goal: Participates in care planning  Description: Participates in care planning  Outcome: Ongoing     Problem: Injury - Risk of, Physical Injury:  Goal: Will remain free from falls  Description: Will remain free from falls  4/15/2022 0833 by Melida Horvath RN  Outcome: Ongoing  4/14/2022 2035 by Vivek Manley RN  Outcome: Ongoing  Goal: Absence of physical injury  Description: Absence of physical injury  4/15/2022 0833 by Melida Horvath RN  Outcome: Ongoing  4/14/2022 2035 by Vivek Manley RN  Outcome: Ongoing     Problem: Mood - Altered:  Goal: Mood stable  Description: Mood stable  Outcome: Ongoing  Goal: Absence of abusive behavior  Description: Absence of abusive behavior  Outcome: Ongoing  Goal: Verbalizations of feeling emotionally comfortable while being cared for will increase  Description: Verbalizations of feeling emotionally comfortable while being cared for will increase  Outcome: Ongoing     Problem: Psychomotor Activity - Altered:  Goal: Absence of psychomotor disturbance signs and symptoms  Description: Absence of psychomotor disturbance signs and symptoms  Outcome: Ongoing     Problem: Sensory Perception - Impaired:  Goal: Demonstrations of improved sensory functioning will increase  Description: Demonstrations of improved sensory functioning will increase  Outcome: Ongoing  Goal: Decrease in sensory misperception frequency  Description: Decrease in sensory misperception frequency  Outcome: Ongoing  Goal: Able to refrain from responding to false sensory perceptions  Description: Able to refrain from responding to false sensory perceptions  Outcome: Ongoing  Goal: Demonstrates accurate environmental perceptions  Description: Demonstrates accurate environmental perceptions  Outcome: Ongoing  Goal: Able to distinguish between reality-based and nonreality-based thinking  Description: Able to distinguish between reality-based and nonreality-based thinking  Outcome: Ongoing  Goal: Able to interrupt nonreality-based thinking  Description: Able to interrupt nonreality-based thinking  Outcome: Ongoing     Problem: Sleep Pattern Disturbance:  Goal: Appears well-rested  Description: Appears well-rested  Outcome: Ongoing     Problem: OXYGENATION/RESPIRATORY FUNCTION  Goal: Patient will maintain patent airway  4/15/2022 0833 by Maeve Lao RN  Outcome: Ongoing  4/15/2022 0813 by JANE BrowerP  Outcome: Ongoing  4/14/2022 2057 by Zelalem Cosme RCP  Outcome: Ongoing  Goal: Patient will achieve/maintain normal respiratory rate/effort  Description: Respiratory rate and effort will be within normal limits for the patient  4/15/2022 0833 by Maeve Lao RN  Outcome: Ongoing  4/15/2022 0813 by June Melvin RCP  Outcome: Ongoing  4/14/2022 2057 by Zelalem Cosme RCP  Outcome: Ongoing     Problem: MECHANICAL VENTILATION  Goal: Patient will maintain patent airway  4/15/2022 0833 by Maeve Lao RN  Outcome: Ongoing  4/15/2022 0813 by JANE BrowerP  Outcome: Ongoing  4/14/2022 2057 by JANE MoserP  Outcome: Ongoing  Goal: Oral health is maintained or improved  4/15/2022 0833 by Maeve Lao RN  Outcome: Ongoing  4/15/2022 0813 by June Melvin RCP  Outcome: Ongoing  4/14/2022 2057 by Zelalem Cosme RCP  Outcome: Ongoing  Goal: ET tube will be managed safely  4/15/2022 0833 by Maeve Lao RN  Outcome: Ongoing  4/15/2022 0813 by June Melvin RCP  Outcome: Ongoing  4/14/2022 2057 by Zelalem Cosme RCP  Outcome: Ongoing  Goal: Ability to express needs and understand communication  4/15/2022 [phone] by Maeve Lao RN  Outcome: Ongoing  4/15/2022 0813 by June Melvin RCP  Outcome: Ongoing  4/14/2022 2057 by Zelalem Cosme RCP  Outcome: Ongoing  Goal: Mobility/activity is maintained at optimum level for patient  4/15/2022 0833 by Maeve Lao RN  Outcome: Ongoing  4/15/2022 0813 by June Ngozi, RCP  Outcome: Ongoing  4/14/2022 2057 by Zelalem Cosme TOMMY  Outcome: Ongoing     Problem: Nutrition  Goal: Optimal nutrition therapy  Outcome: Ongoing     Problem: HEMODYNAMIC STATUS  Goal: Patient has stable vital signs and fluid balance  Outcome: Ongoing     Problem: ACTIVITY INTOLERANCE/IMPAIRED MOBILITY  Goal: Mobility/activity is maintained at optimum level for patient  4/15/2022 2699 by Maeve Lao RN  Outcome: Ongoing  4/15/2022 0813 by June Melvin RCP  Outcome: Ongoing  4/14/2022 2057 by Zelalem Cosme RCP  Outcome: Ongoing

## 2022-04-15 NOTE — PLAN OF CARE
Problem: OXYGENATION/RESPIRATORY FUNCTION  Goal: Patient will maintain patent airway  Outcome: Ongoing  Goal: Patient will achieve/maintain normal respiratory rate/effort  Description: Respiratory rate and effort will be within normal limits for the patient  Outcome: Ongoing     Problem: MECHANICAL VENTILATION  Goal: Patient will maintain patent airway  Outcome: Ongoing  Goal: Oral health is maintained or improved  Outcome: Ongoing  Goal: ET tube will be managed safely  Outcome: Ongoing  Goal: Ability to express needs and understand communication  Outcome: Ongoing  Goal: Mobility/activity is maintained at optimum level for patient  Outcome: Ongoing   BRONCHOSPASM/BRONCHOCONSTRICTION     [x]         IMPROVE AERATION/BREATH SOUNDS  [x]   ADMINISTER BRONCHODILATOR THERAPY AS APPROPRIATE  [x]   ASSESS BREATH SOUNDS  []   IMPLEMENT AEROSOL/MDI PROTOCOL  [x]   PATIENT EDUCATION AS NEEDED

## 2022-04-15 NOTE — PROGRESS NOTES
Patient extubated per physician order per Dr. Oliver Moreland and family. Patient extubated in usual fashion. Patient placed on  room air.      Henna Contreras RCP   10:53 AM

## 2022-04-15 NOTE — SIGNIFICANT EVENT
Trauma team including Dr. Doc Engel spoke to family bedside this morning. Given change in patient status family has elected to withdraw care at this time. Patient made DNR-CC. Palliative care on board.      Reny Meyers DO  10:52 AM  04/15/22

## 2022-04-15 NOTE — PLAN OF CARE
BRONCHOSPASM/BRONCHOCONSTRICTION     [x]         IMPROVE AERATION/BREATH SOUNDS  [x]   ADMINISTER BRONCHODILATOR THERAPY AS APPROPRIATE  [x]   ASSESS BREATH SOUNDS  []   IMPLEMENT AEROSOL/MDI PROTOCOL    Problem: OXYGENATION/RESPIRATORY FUNCTION  Goal: Patient will maintain patent airway  4/15/2022 0813 by Razia Nguyen RCP  Outcome: Ongoing     Problem: OXYGENATION/RESPIRATORY FUNCTION  Goal: Patient will achieve/maintain normal respiratory rate/effort  Description: Respiratory rate and effort will be within normal limits for the patient  4/15/2022 0813 by Razia Nguyen RCP  Outcome: Ongoing     Problem: MECHANICAL VENTILATION  Goal: Patient will maintain patent airway  4/15/2022 0813 by Razia Nguyen RCP  Outcome: Ongoing     Problem: MECHANICAL VENTILATION  Goal: Oral health is maintained or improved  4/15/2022 0813 by Razia Nguyen RCP  Outcome: Ongoing     Problem: MECHANICAL VENTILATION  Goal: ET tube will be managed safely  4/15/2022 0813 by Razia Nguyen RCP  Outcome: Ongoing     Problem: MECHANICAL VENTILATION  Goal: Ability to express needs and understand communication  4/15/2022 0813 by Razia Nguyen RCP  Outcome: Ongoing     Problem: MECHANICAL VENTILATION  Goal: Mobility/activity is maintained at optimum level for patient  4/15/2022 0813 by Razia Nguyen RCP  Outcome: Ongoing     Problem: ACTIVITY INTOLERANCE/IMPAIRED MOBILITY  Goal: Mobility/activity is maintained at optimum level for patient  4/15/2022 0813 by Razia Nguyen RCP  Outcome: Ongoing      PATIENT EDUCATION AS NEEDED

## 2022-04-15 NOTE — FLOWSHEET NOTE
Dr. Raphael Hastings discussed with family patients decline overnight. Recommends comfort care. Family discussing.      Debora Maldonado RN

## 2022-04-15 NOTE — DEATH NOTES
DEATH NOTE    PATIENT NAME: Sylvain Ansari  YOB: 1950  MEDICAL RECORD NO. [id number]  DATE: 4/15/2022  PRIMARY CARE PHYSICIAN: Lorna Vazquez MD    DIAGNOSIS OF DEATH     I have confirmed the death of this patient in accordance with accepted medical standards.   The patient is dead as evidenced by cardiac death or cessation of brain function:    Cardiac Death (check all that apply):     [x]  Absence of respiratory effort by observation     [x]  Absence of pulse by palpation     [x]  Absence of blood pressure by sphygmomanometry     []  Absence of sustainable cardiac rhythm by monitor      CERTIFICATION OF DEATH     I have pronounced the patient dead on:     Date: 4/15/2022 at 11:10AM    NOTIFICATIONS     Attending physician (name) notified: Dr. Ayla Gomez                                                          []  Per Nursing    Family notified: Name and/or Relationship: at bedside                                                        []  Per Nursing     notified (Name):                                                           [x]  Per Berto Bond MD  4/15/2022, 10:56 AM

## 2022-04-15 NOTE — CONSULTS
Palliative Care Inpatient Consult    NAME:  Jo Ann Ceron  MEDICAL RECORD NUMBER:  [id number]  AGE: 73 y.o. GENDER: female  : 1950  TODAY'S DATE:  4/15/2022    Reasons for Consultation:    FAMILY SUPPORT    Members of PC team contributing to this consultation are :  Dr. Fantasma Jacobo: We were updated by the Trauma team on the patient's condition. They did not think she would survive into the afternoon because of her extensive injuries. We met with the patient's  Allison Hammer, daughter Leigh Ann Mario and son and explained the role of Palliative Care. They were very tearful but aware of her poor prognosis. They were saying their goodbyes at the time. We offered them comfort and support. We spoke with the nurse to reach out to the  to visit with the patient and family, and also for the Palliative Cart to make it more comfortable for them. Providing support for coping/adaptation/distress of family  Managing anticipatory grief      1- Symptom management/ pain control     The patient was intubated and sedated    We feel the patient symptoms are being controlled. her current regimen is reviewed by myself and discussed with the staff. 2- Goals of care evaluation   The patient goals of care are preparation for death and support for family/caregiver   Goals of care discussed with:    [] Patient independently    [] Patient and Family    [x] Family or Healthcare DPOA independently    [] Unable to discuss with patient, family/DPOA not present    3- Code Status  Full code    4- Other recommendations   - We will continue to provide comfort and support to the patient and the family      Palliative Care will continue to follow Ms. Sharma's care as needed. Thank you for allowing Palliative Care to participate in the care of Ms. Sharma . History of Present Illness     The patient is a 73 y.o. female who fell from standing at home.  She was found to have multiple frontal and parietal intraparenchymal hemorrhages, SAH, SDHs with midline shift from left to right. Throughout her admission, she had worsening neuro examinations, unresponsive off sedation. MRI brain was done showing subfalcine herniation and left uncal herniation. Referred to Palliative Care by   [x] Physician   [] Nursing  [] Family Request   [] Other:       Active Hospital Problems    Diagnosis Date Noted    SDH (subdural hematoma) ([facility] [address] [G89.2X5A] 04/13/2022    Fall from standing [W19. XXXA] 04/13/2022    Contusion of frontal lobe with loss of consciousness of 30 minutes or less, initial encounter [facility]) [S06.331A] 04/13/2022    Traumatic subarachnoid hemorrhage with loss of consciousness of 30 minutes or less ([facility] [address] [S06.6X1A] 04/13/2022    Cerebellar stroke ([facility] [address] [I63.9]     Closed head injury [S09.90XA]        PAST MEDICAL HISTORY  No past medical history on file. PAST SURGICAL HISTORY  Past Surgical History:   Procedure Laterality Date    INSERT ARTERIAL LINE  4/13/2022            SOCIAL HISTORY  Social History     Tobacco Use    Smoking status: Not on file    Smokeless tobacco: Not on file   Substance Use Topics    Alcohol use: Not on file    Drug use: Not on file       FAMILY HISTORY  No family history on file.     ALLERGIES  No Known Allergies    MEDICATIONS  Current Medications    vancomycin  15 mg/kg IntraVENous Q24H    piperacillin-tazobactam  3,375 mg IntraVENous Q8H    levETIRAcetam  500 mg Oral BID    dextrose  50 g IntraVENous Once    insulin lispro  0-18 Units SubCUTAneous Q4H    sodium chloride flush  5-40 mL IntraVENous 2 times per day    polyethylene glycol  17 g Oral Daily    sennosides-docusate sodium  1 tablet Oral BID    acetaminophen  1,000 mg Oral 3 times per day    gabapentin  300 mg Oral 3 times per day    [Held by provider] PARoxetine  20 mg Oral Daily    atorvastatin  10 mg Oral Nightly    [Held by provider] folic acid  1 mg Oral Daily    [Held by provider] amLODIPine  5 mg Per NG tube Daily    thiamine  500 mg Oral Daily    famotidine (PEPCID) injection  20 mg IntraVENous BID    metoclopramide  10 mg Oral Q6H     fentanNYL, sodium chloride flush, sodium chloride, ondansetron **OR** ondansetron, sodium chloride, glucose, dextrose, glucagon (rDNA), dextrose, sodium chloride  Home Medications  No current facility-administered medications on file prior to encounter.      Current Outpatient Medications on File Prior to Encounter   Medication Sig Dispense Refill    atorvastatin (LIPITOR) 10 MG tablet Take 10 mg by mouth daily      aspirin 81 MG chewable tablet Take 81 mg by mouth daily      tiZANidine (ZANAFLEX) 4 MG tablet Take 4 mg by mouth every 8 hours as needed      topiramate (TOPAMAX) 25 MG tablet Take 50 mg by mouth 2 times daily      oxybutynin (DITROPAN) 5 MG tablet Take 5 mg by mouth 3 times daily      PARoxetine (PAXIL) 20 MG tablet Take 20 mg by mouth every morning      celecoxib (CELEBREX) 200 MG capsule Take 200 mg by mouth 2 times daily      ascorbic acid (VITAMIN C) 500 MG tablet Take 500 mg by mouth daily      dilTIAZem (DILACOR XR) 180 MG extended release capsule Take 180 mg by mouth daily      Multiple Vitamins-Minerals (THERAPEUTIC MULTIVITAMIN-MINERALS) tablet Take 1 tablet by mouth daily      calcium carbonate 600 MG TABS tablet Take 1 tablet by mouth daily      vitamin D (CHOLECALCIFEROL) 125 MCG (5000 UT) CAPS capsule Take 5,000 Units by mouth daily         Data         /64   Pulse 95   Temp 103 °F (39.4 °C) (Oral)   Resp 14   Ht 5' 6\" (1.676 m)   Wt 149 lb 0.5 oz (67.6 kg)   SpO2 (!) 85%   BMI 24.05 kg/m²     Wt Readings from Last 3 Encounters:   04/15/22 149 lb 0.5 oz (67.6 kg)        Code Status: DNR-CC     ADVANCED CARE PLANNING:  Patient has capacity for medical decisions: no  Health Care Power of : no  Living Will: no     Personal, Social, and Family History  Marital Status:   Living situation: with family:  spouse  Does patient understand diagnosis/treatment? yes  Does caregiver understand diagnosis/treatment? yes    Assessment        REVIEW OF SYSTEMS  Could not obtain as the patient was unresponsive    PHYSICAL ASSESSMENT:  Physical Exam  Constitutional:       Appearance: She is ill-appearing. Neck:      Comments: C-collar in place  Cardiovascular:      Rate and Rhythm: Tachycardia present. Pulmonary:      Comments: Intubated   Neurological:      Comments: Unresponsive          Palliative Performance Scale:  ___100% Full ambulation; normal activity/No disease; full self-care; normal intake; LOC full  ___90% full ambulation; normal activity/some disease; full self-care; normal intake; LOC full  ___80% ambulation full; normal activity with effort/some disease; full self-care; normal/reduced intake; LOC full  ___70% ambulation reduced; cannot do normal work/some disease; full self-care; normal/reduce intake; LOC full  ___60%  Ambulation reduced; Significant disease; Can't do hobbies/housework; intake normal or reduced; occasional assist; LOC full/confusion  ___50%  Mainly sit/lie; Extensive disease; Can't do any work; Considerable assist; intake normal or reduced; LOC full/confusion  ___40%  Mainly in bed; Extensive disease; Mainly assist; intake normal or reduced; LOC full/confusion   ___30%  Bed Bound; Extensive disease; Total care; intake reduced; LOC full/confusion  ___20%  Bed Bound; Extensive disease; Total care; intake minimal; Drowsy/coma  _x_10%  Bed Bound; Extensive disease;  Total care; Mouth care only; Drowsy/coma  ___0       Death      Principle Problem/Diagnosis:  Principal Problem:    Fall from standing  Active Problems:    SDH (subdural hematoma) (HCC)    Contusion of frontal lobe with loss of consciousness of 30 minutes or less, initial encounter ([facility] [address]    Traumatic subarachnoid hemorrhage with loss of consciousness of 30 minutes or less ([facility] [address]    Cerebellar stroke ([facility] [address] Closed head injury    Please call with any palliative questions or concerns. Palliative Care Team is available via perfect serve or via phone. This note has been dictated by dragon, typing errors may be a possibility. The total time I spent in seeing the patient, discussing goals of care, advanced directives, code status, greater than 50% time in counseling and other major issues was more than 60 minutes      Electronically signed by      Arsh Wilkins MD  Hospice/Palliative Care Fellow  [place], [place]  4/15/2022 11:51 AM  Palliative care office: [phone]      ATTENDING ATTESTATION:    I have discussed the care of Mariella Guerrero, including pertinent history and exam findings, with the resident/fellow/NP. I have seen and examined the patient and the key elements of all parts of the encounter have been performed by me. I agree with the assessment, plan and orders as documented by the fellow/resident/NP, after I modified the exam findings and the plan of treatments and the final version is my approved version of the assessment.       Additional Comments:   Patient was seen today along with palliative care fellow  We were updated by primary care team regarding patient's current medical conditions and were informed that family has been told regarding patient's very poor prognosis with very slow survival chances  We met with patient's  and other family members and offered comfort and emotional support        Electronically signed by Matt Horan MD on 4/15/2022 at 12:14 PM

## 2022-04-15 NOTE — FLOWSHEET NOTE
Code status changed to [facility]. IV medications stopped. Palliative medications given per MAR. Terminal extubation at 1053. Time of death 0.        Sylvia Proctor RN

## 2022-04-19 LAB — VITAMIN B1 WHOLE BLOOD: 221 NMOL/L (ref 70–180)

## 2022-04-25 NOTE — PROGRESS NOTES
Physician Progress Note      PATIENT:               Charly Hopkins  CSN #:                  [id number]  :                       1950  ADMIT DATE:       2022 12:10 AM  DISCH DATE:        4/15/2022 2:50 PM  RESPONDING  PROVIDER #:        Mike Locke APRN - CNP          QUERY TEXT:    Pt admitted with Traumatic SDH and SAH developed R cerebellar stroke. Pt   noted to have per MRI Multifocal areas of intracranial hemorrhage largest   involving the left frontal lobe measuring 5.1 x 6.3 cm in size with   left-to-right midline shift  measuring 8 mm and associated subfalcine herniation and left-sided uncal   herniation. If clinically significant, please document in progress notes and discharge   summary if you are evaluating/treating any of the following: The medical record reflects the following:  Risk Factors:71 yr old s/p fall w/ Traumatic SDH, SAH, R Cerebellar stroke  Clinical Indicators:  MRI Multifocal areas of intracranial hemorrhage largest   involving the left frontal lobe measuring 5.1 x 6.3 cm in size with   left-to-right midline shift measuring 8 mm and associated subfalcine   herniation and left-sided uncal herniation. Per progress notes Initial CTH   showing b/l SDH and SAH, right occipital skull fx, progressively worsening CT   imaging, neurosurgery aware- MRI brain showing cerebellar stroke, with   worsening bleed and concerning for uncal herniation  Treatment: 3% saline, intubation w/ MV, repeat CT head, MRI brain, Palliative   care    Thank you, please contact me for any questions!  Tomas Gibbs RN CDI Supervisor   [phone]  Options provided:  -- Brain compression  -- Cerebral edema and Brain compression  -- Traumatic brain compression  -- Other - I will add my own diagnosis  -- Disagree - Not applicable / Not valid  -- Disagree - Clinically unable to determine / Unknown  -- Refer to Clinical Documentation Reviewer    PROVIDER RESPONSE TEXT:    This patient has both traumatic and non-traumatic brain compression.     Query created by: Carlyle George on 4/15/2022 1:08 PM      Electronically signed by:  Cleveland Valdovinos CNP 4/25/2022 1:53 PM

## (undated) DEVICE — FORCEPS BX L240CM JAW DIA2.4MM ORNG L CAP W/ NDL DISP RAD

## (undated) DEVICE — CANNULA ORAL NSL AD CO2 N INTUB O2 DEL DISP TRU LNK

## (undated) DEVICE — MEDI-VAC NON-CONDUCTIVE TUBING7MM X 30.5 (100FT): Brand: CARDINAL HEALTH

## (undated) DEVICE — CATH EXTRCT BALLOON ROTH 2.5 MMX230 CM G

## (undated) DEVICE — SOLUTION IV IRRIG POUR BRL 0.9% SODIUM CHL 2F7124